# Patient Record
Sex: MALE | Race: BLACK OR AFRICAN AMERICAN | Employment: UNEMPLOYED | URBAN - METROPOLITAN AREA
[De-identification: names, ages, dates, MRNs, and addresses within clinical notes are randomized per-mention and may not be internally consistent; named-entity substitution may affect disease eponyms.]

---

## 2019-10-11 ENCOUNTER — HOSPITAL ENCOUNTER (EMERGENCY)
Age: 64
Discharge: HOME | End: 2019-10-11
Attending: EMERGENCY MEDICINE
Payer: MEDICAID

## 2019-10-11 ENCOUNTER — APPOINTMENT (OUTPATIENT)
Dept: CT IMAGING | Age: 64
End: 2019-10-11
Payer: MEDICAID

## 2019-10-11 VITALS
BODY MASS INDEX: 33.03 KG/M2 | WEIGHT: 223 LBS | HEART RATE: 97 BPM | RESPIRATION RATE: 16 BRPM | HEIGHT: 69 IN | DIASTOLIC BLOOD PRESSURE: 90 MMHG | SYSTOLIC BLOOD PRESSURE: 176 MMHG | OXYGEN SATURATION: 97 % | TEMPERATURE: 98.5 F

## 2019-10-11 DIAGNOSIS — K52.9 GASTROENTERITIS: Primary | ICD-10-CM

## 2019-10-11 LAB
ALBUMIN SERPL-MCNC: 4.8 G/DL (ref 3.5–4.6)
ALP BLD-CCNC: 100 U/L (ref 35–104)
ALT SERPL-CCNC: 25 U/L (ref 0–41)
ANION GAP SERPL CALCULATED.3IONS-SCNC: 13 MEQ/L (ref 9–15)
AST SERPL-CCNC: 26 U/L (ref 0–40)
BASOPHILS ABSOLUTE: 0.1 K/UL (ref 0–0.2)
BASOPHILS RELATIVE PERCENT: 0.5 %
BILIRUB SERPL-MCNC: 1 MG/DL (ref 0.2–0.7)
BILIRUBIN DIRECT: <0.2 MG/DL (ref 0–0.4)
BILIRUBIN, INDIRECT: ABNORMAL MG/DL (ref 0–0.6)
BUN BLDV-MCNC: 21 MG/DL (ref 8–23)
CALCIUM SERPL-MCNC: 9.6 MG/DL (ref 8.5–9.9)
CHLORIDE BLD-SCNC: 100 MEQ/L (ref 95–107)
CO2: 27 MEQ/L (ref 20–31)
CREAT SERPL-MCNC: 1.25 MG/DL (ref 0.7–1.2)
EOSINOPHILS ABSOLUTE: 0 K/UL (ref 0–0.7)
EOSINOPHILS RELATIVE PERCENT: 0.4 %
GFR AFRICAN AMERICAN: >60
GFR NON-AFRICAN AMERICAN: 58.5
GLUCOSE BLD-MCNC: 130 MG/DL (ref 70–99)
HCT VFR BLD CALC: 44.9 % (ref 42–52)
HEMOGLOBIN: 14.7 G/DL (ref 14–18)
LIPASE: 18 U/L (ref 12–95)
LYMPHOCYTES ABSOLUTE: 1.7 K/UL (ref 1–4.8)
LYMPHOCYTES RELATIVE PERCENT: 14.8 %
MCH RBC QN AUTO: 27.8 PG (ref 27–31.3)
MCHC RBC AUTO-ENTMCNC: 32.8 % (ref 33–37)
MCV RBC AUTO: 84.7 FL (ref 80–100)
MONOCYTES ABSOLUTE: 0.6 K/UL (ref 0.2–0.8)
MONOCYTES RELATIVE PERCENT: 5.2 %
NEUTROPHILS ABSOLUTE: 8.9 K/UL (ref 1.4–6.5)
NEUTROPHILS RELATIVE PERCENT: 79.1 %
PDW BLD-RTO: 14.7 % (ref 11.5–14.5)
PLATELET # BLD: 315 K/UL (ref 130–400)
POTASSIUM SERPL-SCNC: 4 MEQ/L (ref 3.4–4.9)
RBC # BLD: 5.3 M/UL (ref 4.7–6.1)
SODIUM BLD-SCNC: 140 MEQ/L (ref 135–144)
TOTAL PROTEIN: 8.2 G/DL (ref 6.3–8)
WBC # BLD: 11.3 K/UL (ref 4.8–10.8)

## 2019-10-11 PROCEDURE — 85025 COMPLETE CBC W/AUTO DIFF WBC: CPT

## 2019-10-11 PROCEDURE — 74177 CT ABD & PELVIS W/CONTRAST: CPT

## 2019-10-11 PROCEDURE — 83690 ASSAY OF LIPASE: CPT

## 2019-10-11 PROCEDURE — 80048 BASIC METABOLIC PNL TOTAL CA: CPT

## 2019-10-11 PROCEDURE — 6360000002 HC RX W HCPCS: Performed by: EMERGENCY MEDICINE

## 2019-10-11 PROCEDURE — 2580000003 HC RX 258: Performed by: EMERGENCY MEDICINE

## 2019-10-11 PROCEDURE — 99284 EMERGENCY DEPT VISIT MOD MDM: CPT

## 2019-10-11 PROCEDURE — 80076 HEPATIC FUNCTION PANEL: CPT

## 2019-10-11 PROCEDURE — 96374 THER/PROPH/DIAG INJ IV PUSH: CPT

## 2019-10-11 PROCEDURE — 36415 COLL VENOUS BLD VENIPUNCTURE: CPT

## 2019-10-11 PROCEDURE — 6360000004 HC RX CONTRAST MEDICATION: Performed by: EMERGENCY MEDICINE

## 2019-10-11 RX ORDER — ONDANSETRON 2 MG/ML
4 INJECTION INTRAMUSCULAR; INTRAVENOUS ONCE
Status: COMPLETED | OUTPATIENT
Start: 2019-10-11 | End: 2019-10-11

## 2019-10-11 RX ORDER — SIMVASTATIN 5 MG
5 TABLET ORAL NIGHTLY
COMMUNITY

## 2019-10-11 RX ORDER — MORPHINE SULFATE 4 MG/ML
4 INJECTION, SOLUTION INTRAMUSCULAR; INTRAVENOUS
Status: DISCONTINUED | OUTPATIENT
Start: 2019-10-11 | End: 2019-10-11 | Stop reason: HOSPADM

## 2019-10-11 RX ORDER — HYDROCHLOROTHIAZIDE 12.5 MG/1
25 TABLET ORAL DAILY
COMMUNITY

## 2019-10-11 RX ORDER — ONDANSETRON 4 MG/1
4 TABLET, ORALLY DISINTEGRATING ORAL EVERY 8 HOURS PRN
Qty: 10 TABLET | Refills: 0 | Status: SHIPPED | OUTPATIENT
Start: 2019-10-11

## 2019-10-11 RX ORDER — 0.9 % SODIUM CHLORIDE 0.9 %
500 INTRAVENOUS SOLUTION INTRAVENOUS ONCE
Status: COMPLETED | OUTPATIENT
Start: 2019-10-11 | End: 2019-10-11

## 2019-10-11 RX ADMIN — SODIUM CHLORIDE 500 ML: 9 INJECTION, SOLUTION INTRAVENOUS at 16:59

## 2019-10-11 RX ADMIN — IOPAMIDOL 100 ML: 755 INJECTION, SOLUTION INTRAVENOUS at 17:39

## 2019-10-11 RX ADMIN — MORPHINE SULFATE 4 MG: 4 INJECTION, SOLUTION INTRAMUSCULAR; INTRAVENOUS at 16:59

## 2019-10-11 RX ADMIN — ONDANSETRON 4 MG: 2 INJECTION INTRAMUSCULAR; INTRAVENOUS at 16:59

## 2019-10-11 ASSESSMENT — ENCOUNTER SYMPTOMS
BACK PAIN: 0
SHORTNESS OF BREATH: 0
EYE PAIN: 0
RESPIRATORY NEGATIVE: 1
DIARRHEA: 0
COUGH: 0
ABDOMINAL DISTENTION: 0
CHEST TIGHTNESS: 0
EYE DISCHARGE: 0
NAUSEA: 1
WHEEZING: 0
SORE THROAT: 0
EYES NEGATIVE: 1
BLOOD IN STOOL: 0
VOMITING: 1
ABDOMINAL PAIN: 1
SINUS PAIN: 0

## 2019-10-11 ASSESSMENT — PAIN DESCRIPTION - FREQUENCY: FREQUENCY: CONTINUOUS

## 2019-10-11 ASSESSMENT — PAIN SCALES - GENERAL
PAINLEVEL_OUTOF10: 5
PAINLEVEL_OUTOF10: 7

## 2019-10-11 ASSESSMENT — PAIN DESCRIPTION - DESCRIPTORS: DESCRIPTORS: STABBING;CONSTANT

## 2019-10-11 ASSESSMENT — PAIN DESCRIPTION - ONSET: ONSET: SUDDEN

## 2019-10-11 ASSESSMENT — PAIN DESCRIPTION - LOCATION: LOCATION: ABDOMEN;FLANK

## 2019-10-11 ASSESSMENT — PAIN DESCRIPTION - PAIN TYPE: TYPE: ACUTE PAIN

## 2019-10-11 ASSESSMENT — PAIN DESCRIPTION - ORIENTATION: ORIENTATION: RIGHT;LOWER

## 2023-11-17 ENCOUNTER — APPOINTMENT (OUTPATIENT)
Dept: RADIOLOGY | Facility: HOSPITAL | Age: 68
End: 2023-11-17
Payer: MEDICAID

## 2023-11-17 ENCOUNTER — HOSPITAL ENCOUNTER (INPATIENT)
Facility: HOSPITAL | Age: 68
LOS: 5 days | Discharge: NURSING, REHAB OR LONG-TERM CARE | End: 2023-11-22
Attending: EMERGENCY MEDICINE | Admitting: INTERNAL MEDICINE
Payer: MEDICAID

## 2023-11-17 DIAGNOSIS — I63.9 ISCHEMIC STROKE (MULTI): ICD-10-CM

## 2023-11-17 DIAGNOSIS — I63.89: Primary | ICD-10-CM

## 2023-11-17 DIAGNOSIS — I10 PRIMARY HYPERTENSION: ICD-10-CM

## 2023-11-17 LAB
ALBUMIN SERPL BCP-MCNC: 2 G/DL (ref 3.4–5)
ALP SERPL-CCNC: 35 U/L (ref 33–136)
ALT SERPL W P-5'-P-CCNC: 7 U/L (ref 10–52)
ANION GAP SERPL CALC-SCNC: 7 MMOL/L (ref 10–20)
APTT PPP: 35 SECONDS (ref 27–38)
AST SERPL W P-5'-P-CCNC: 8 U/L (ref 9–39)
BASOPHILS # BLD AUTO: 0.05 X10*3/UL (ref 0–0.1)
BASOPHILS NFR BLD AUTO: 0.6 %
BILIRUB SERPL-MCNC: 0.5 MG/DL (ref 0–1.2)
BNP SERPL-MCNC: 202 PG/ML (ref 0–99)
BUN SERPL-MCNC: 7 MG/DL (ref 6–23)
CALCIUM SERPL-MCNC: 4.3 MG/DL (ref 8.6–10.3)
CARDIAC TROPONIN I PNL SERPL HS: 19 NG/L (ref 0–20)
CHLORIDE SERPL-SCNC: 115 MMOL/L (ref 98–107)
CHOLEST SERPL-MCNC: 220 MG/DL (ref 0–199)
CHOLESTEROL/HDL RATIO: 4.1
CK SERPL-CCNC: 214 U/L (ref 0–325)
CO2 SERPL-SCNC: 16 MMOL/L (ref 21–32)
CREAT SERPL-MCNC: 0.44 MG/DL (ref 0.5–1.3)
EOSINOPHIL # BLD AUTO: 0.05 X10*3/UL (ref 0–0.7)
EOSINOPHIL NFR BLD AUTO: 0.6 %
ERYTHROCYTE [DISTWIDTH] IN BLOOD BY AUTOMATED COUNT: 13.9 % (ref 11.5–14.5)
GFR SERPL CREATININE-BSD FRML MDRD: >90 ML/MIN/1.73M*2
GLUCOSE BLD MANUAL STRIP-MCNC: 102 MG/DL (ref 74–99)
GLUCOSE SERPL-MCNC: 66 MG/DL (ref 74–99)
HCT VFR BLD AUTO: 44.5 % (ref 41–52)
HDLC SERPL-MCNC: 53.9 MG/DL
HGB BLD-MCNC: 14.4 G/DL (ref 13.5–17.5)
IMM GRANULOCYTES # BLD AUTO: 0.02 X10*3/UL (ref 0–0.7)
IMM GRANULOCYTES NFR BLD AUTO: 0.2 % (ref 0–0.9)
INR PPP: 1.3 (ref 0.9–1.1)
LDLC SERPL CALC-MCNC: 138 MG/DL
LYMPHOCYTES # BLD AUTO: 2.33 X10*3/UL (ref 1.2–4.8)
LYMPHOCYTES NFR BLD AUTO: 28.3 %
MCH RBC QN AUTO: 27 PG (ref 26–34)
MCHC RBC AUTO-ENTMCNC: 32.4 G/DL (ref 32–36)
MCV RBC AUTO: 83 FL (ref 80–100)
MONOCYTES # BLD AUTO: 0.67 X10*3/UL (ref 0.1–1)
MONOCYTES NFR BLD AUTO: 8.1 %
NEUTROPHILS # BLD AUTO: 5.11 X10*3/UL (ref 1.2–7.7)
NEUTROPHILS NFR BLD AUTO: 62.2 %
NON HDL CHOLESTEROL: 166 MG/DL (ref 0–149)
NRBC BLD-RTO: 0 /100 WBCS (ref 0–0)
PLATELET # BLD AUTO: 240 X10*3/UL (ref 150–450)
POTASSIUM SERPL-SCNC: 2.1 MMOL/L (ref 3.5–5.3)
PROT SERPL-MCNC: 3.2 G/DL (ref 6.4–8.2)
PROTHROMBIN TIME: 14.7 SECONDS (ref 9.8–12.8)
RBC # BLD AUTO: 5.34 X10*6/UL (ref 4.5–5.9)
SODIUM SERPL-SCNC: 136 MMOL/L (ref 136–145)
TRIGL SERPL-MCNC: 142 MG/DL (ref 0–149)
VLDL: 28 MG/DL (ref 0–40)
WBC # BLD AUTO: 8.2 X10*3/UL (ref 4.4–11.3)

## 2023-11-17 PROCEDURE — 96375 TX/PRO/DX INJ NEW DRUG ADDON: CPT

## 2023-11-17 PROCEDURE — 84484 ASSAY OF TROPONIN QUANT: CPT | Performed by: EMERGENCY MEDICINE

## 2023-11-17 PROCEDURE — 96374 THER/PROPH/DIAG INJ IV PUSH: CPT

## 2023-11-17 PROCEDURE — 2500000004 HC RX 250 GENERAL PHARMACY W/ HCPCS (ALT 636 FOR OP/ED): Performed by: INTERNAL MEDICINE

## 2023-11-17 PROCEDURE — 2500000001 HC RX 250 WO HCPCS SELF ADMINISTERED DRUGS (ALT 637 FOR MEDICARE OP): Performed by: EMERGENCY MEDICINE

## 2023-11-17 PROCEDURE — 70551 MRI BRAIN STEM W/O DYE: CPT

## 2023-11-17 PROCEDURE — 70450 CT HEAD/BRAIN W/O DYE: CPT

## 2023-11-17 PROCEDURE — 85025 COMPLETE CBC W/AUTO DIFF WBC: CPT | Performed by: EMERGENCY MEDICINE

## 2023-11-17 PROCEDURE — 80053 COMPREHEN METABOLIC PANEL: CPT | Performed by: EMERGENCY MEDICINE

## 2023-11-17 PROCEDURE — 83036 HEMOGLOBIN GLYCOSYLATED A1C: CPT | Mod: AHULAB | Performed by: INTERNAL MEDICINE

## 2023-11-17 PROCEDURE — 99285 EMERGENCY DEPT VISIT HI MDM: CPT | Mod: 25,27 | Performed by: EMERGENCY MEDICINE

## 2023-11-17 PROCEDURE — 99291 CRITICAL CARE FIRST HOUR: CPT | Performed by: EMERGENCY MEDICINE

## 2023-11-17 PROCEDURE — 85730 THROMBOPLASTIN TIME PARTIAL: CPT | Performed by: EMERGENCY MEDICINE

## 2023-11-17 PROCEDURE — 2550000001 HC RX 255 CONTRASTS: Performed by: EMERGENCY MEDICINE

## 2023-11-17 PROCEDURE — 2500000004 HC RX 250 GENERAL PHARMACY W/ HCPCS (ALT 636 FOR OP/ED): Performed by: EMERGENCY MEDICINE

## 2023-11-17 PROCEDURE — 83880 ASSAY OF NATRIURETIC PEPTIDE: CPT | Performed by: INTERNAL MEDICINE

## 2023-11-17 PROCEDURE — 70551 MRI BRAIN STEM W/O DYE: CPT | Performed by: SURGERY

## 2023-11-17 PROCEDURE — 85610 PROTHROMBIN TIME: CPT | Performed by: EMERGENCY MEDICINE

## 2023-11-17 PROCEDURE — 2060000001 HC INTERMEDIATE ICU ROOM DAILY

## 2023-11-17 PROCEDURE — 70498 CT ANGIOGRAPHY NECK: CPT

## 2023-11-17 PROCEDURE — 70498 CT ANGIOGRAPHY NECK: CPT | Performed by: RADIOLOGY

## 2023-11-17 PROCEDURE — 82947 ASSAY GLUCOSE BLOOD QUANT: CPT

## 2023-11-17 PROCEDURE — 82550 ASSAY OF CK (CPK): CPT | Performed by: EMERGENCY MEDICINE

## 2023-11-17 PROCEDURE — 36415 COLL VENOUS BLD VENIPUNCTURE: CPT | Performed by: INTERNAL MEDICINE

## 2023-11-17 PROCEDURE — 70496 CT ANGIOGRAPHY HEAD: CPT | Performed by: RADIOLOGY

## 2023-11-17 PROCEDURE — 36415 COLL VENOUS BLD VENIPUNCTURE: CPT | Performed by: EMERGENCY MEDICINE

## 2023-11-17 PROCEDURE — 70496 CT ANGIOGRAPHY HEAD: CPT

## 2023-11-17 PROCEDURE — 80061 LIPID PANEL: CPT | Performed by: INTERNAL MEDICINE

## 2023-11-17 RX ORDER — LORAZEPAM 2 MG/ML
1 INJECTION INTRAMUSCULAR ONCE
Status: COMPLETED | OUTPATIENT
Start: 2023-11-17 | End: 2023-11-17

## 2023-11-17 RX ORDER — TALC
3 POWDER (GRAM) TOPICAL DAILY
Status: DISCONTINUED | OUTPATIENT
Start: 2023-11-17 | End: 2023-11-22 | Stop reason: HOSPADM

## 2023-11-17 RX ORDER — HYDRALAZINE HYDROCHLORIDE 20 MG/ML
10 INJECTION INTRAMUSCULAR; INTRAVENOUS ONCE
Status: COMPLETED | OUTPATIENT
Start: 2023-11-17 | End: 2023-11-17

## 2023-11-17 RX ORDER — HYDRALAZINE HYDROCHLORIDE 25 MG/1
25 TABLET, FILM COATED ORAL EVERY 6 HOURS PRN
Status: DISCONTINUED | OUTPATIENT
Start: 2023-11-19 | End: 2023-11-17

## 2023-11-17 RX ORDER — LABETALOL HYDROCHLORIDE 5 MG/ML
10 INJECTION, SOLUTION INTRAVENOUS EVERY 10 MIN PRN
Status: ACTIVE | OUTPATIENT
Start: 2023-11-17 | End: 2023-11-19

## 2023-11-17 RX ORDER — ENOXAPARIN SODIUM 100 MG/ML
40 INJECTION SUBCUTANEOUS DAILY
Status: DISCONTINUED | OUTPATIENT
Start: 2023-11-18 | End: 2023-11-22 | Stop reason: HOSPADM

## 2023-11-17 RX ORDER — ONDANSETRON HYDROCHLORIDE 2 MG/ML
4 INJECTION, SOLUTION INTRAVENOUS EVERY 8 HOURS PRN
Status: DISCONTINUED | OUTPATIENT
Start: 2023-11-17 | End: 2023-11-22 | Stop reason: HOSPADM

## 2023-11-17 RX ORDER — ACETAMINOPHEN 325 MG/1
650 TABLET ORAL EVERY 4 HOURS PRN
Status: DISCONTINUED | OUTPATIENT
Start: 2023-11-17 | End: 2023-11-22 | Stop reason: HOSPADM

## 2023-11-17 RX ORDER — CLONIDINE HYDROCHLORIDE 0.1 MG/1
0.2 TABLET ORAL ONCE
Status: COMPLETED | OUTPATIENT
Start: 2023-11-17 | End: 2023-11-17

## 2023-11-17 RX ORDER — ONDANSETRON 4 MG/1
4 TABLET, FILM COATED ORAL EVERY 8 HOURS PRN
Status: DISCONTINUED | OUTPATIENT
Start: 2023-11-17 | End: 2023-11-22 | Stop reason: HOSPADM

## 2023-11-17 RX ORDER — POTASSIUM CHLORIDE 20 MEQ/1
40 TABLET, EXTENDED RELEASE ORAL ONCE
Status: COMPLETED | OUTPATIENT
Start: 2023-11-17 | End: 2023-11-17

## 2023-11-17 RX ORDER — HYDRALAZINE HYDROCHLORIDE 20 MG/ML
10 INJECTION INTRAMUSCULAR; INTRAVENOUS EVERY 4 HOURS PRN
Status: DISCONTINUED | OUTPATIENT
Start: 2023-11-17 | End: 2023-11-22 | Stop reason: HOSPADM

## 2023-11-17 RX ORDER — ACETAMINOPHEN 160 MG/5ML
650 SOLUTION ORAL EVERY 4 HOURS PRN
Status: DISCONTINUED | OUTPATIENT
Start: 2023-11-17 | End: 2023-11-22 | Stop reason: HOSPADM

## 2023-11-17 RX ORDER — POTASSIUM CHLORIDE 14.9 MG/ML
20 INJECTION INTRAVENOUS ONCE
Status: COMPLETED | OUTPATIENT
Start: 2023-11-17 | End: 2023-11-17

## 2023-11-17 RX ORDER — SENNOSIDES 8.6 MG/1
2 TABLET ORAL 2 TIMES DAILY
Status: DISCONTINUED | OUTPATIENT
Start: 2023-11-17 | End: 2023-11-22 | Stop reason: HOSPADM

## 2023-11-17 RX ORDER — CALCIUM GLUCONATE 20 MG/ML
1 INJECTION, SOLUTION INTRAVENOUS ONCE
Status: COMPLETED | OUTPATIENT
Start: 2023-11-17 | End: 2023-11-17

## 2023-11-17 RX ORDER — ATORVASTATIN CALCIUM 80 MG/1
80 TABLET, FILM COATED ORAL NIGHTLY
Status: DISCONTINUED | OUTPATIENT
Start: 2023-11-17 | End: 2023-11-22 | Stop reason: HOSPADM

## 2023-11-17 RX ORDER — ACETAMINOPHEN 650 MG/1
650 SUPPOSITORY RECTAL EVERY 4 HOURS PRN
Status: DISCONTINUED | OUTPATIENT
Start: 2023-11-17 | End: 2023-11-22 | Stop reason: HOSPADM

## 2023-11-17 RX ORDER — POTASSIUM CHLORIDE 1.5 G/1.58G
20 POWDER, FOR SOLUTION ORAL ONCE
Status: COMPLETED | OUTPATIENT
Start: 2023-11-17 | End: 2023-11-17

## 2023-11-17 RX ORDER — ASPIRIN 81 MG/1
81 TABLET ORAL DAILY
Status: DISCONTINUED | OUTPATIENT
Start: 2023-11-18 | End: 2023-11-22 | Stop reason: HOSPADM

## 2023-11-17 RX ORDER — MAGNESIUM HYDROXIDE 2400 MG/10ML
10 SUSPENSION ORAL DAILY PRN
Status: DISCONTINUED | OUTPATIENT
Start: 2023-11-17 | End: 2023-11-22 | Stop reason: HOSPADM

## 2023-11-17 RX ORDER — HYDRALAZINE HYDROCHLORIDE 20 MG/ML
10 INJECTION INTRAMUSCULAR; INTRAVENOUS
Status: DISCONTINUED | OUTPATIENT
Start: 2023-11-17 | End: 2023-11-17

## 2023-11-17 RX ADMIN — IOHEXOL 75 ML: 350 INJECTION, SOLUTION INTRAVENOUS at 14:01

## 2023-11-17 RX ADMIN — POTASSIUM CHLORIDE 20 MEQ: 14.9 INJECTION, SOLUTION INTRAVENOUS at 16:06

## 2023-11-17 RX ADMIN — CALCIUM GLUCONATE 1 G: 20 INJECTION, SOLUTION INTRAVENOUS at 18:19

## 2023-11-17 RX ADMIN — POTASSIUM CHLORIDE 20 MEQ: 1.5 FOR SOLUTION ORAL at 15:30

## 2023-11-17 RX ADMIN — LORAZEPAM 1 MG: 2 INJECTION INTRAMUSCULAR; INTRAVENOUS at 17:46

## 2023-11-17 RX ADMIN — HYDRALAZINE HYDROCHLORIDE 10 MG: 20 INJECTION INTRAMUSCULAR; INTRAVENOUS at 16:06

## 2023-11-17 RX ADMIN — CLONIDINE HYDROCHLORIDE 0.2 MG: 0.1 TABLET ORAL at 16:52

## 2023-11-17 RX ADMIN — POTASSIUM CHLORIDE 40 MEQ: 1500 TABLET, EXTENDED RELEASE ORAL at 18:44

## 2023-11-17 ASSESSMENT — COLUMBIA-SUICIDE SEVERITY RATING SCALE - C-SSRS
2. HAVE YOU ACTUALLY HAD ANY THOUGHTS OF KILLING YOURSELF?: NO
1. IN THE PAST MONTH, HAVE YOU WISHED YOU WERE DEAD OR WISHED YOU COULD GO TO SLEEP AND NOT WAKE UP?: NO
2. HAVE YOU ACTUALLY HAD ANY THOUGHTS OF KILLING YOURSELF?: NO
1. IN THE PAST MONTH, HAVE YOU WISHED YOU WERE DEAD OR WISHED YOU COULD GO TO SLEEP AND NOT WAKE UP?: NO
6. HAVE YOU EVER DONE ANYTHING, STARTED TO DO ANYTHING, OR PREPARED TO DO ANYTHING TO END YOUR LIFE?: NO
6. HAVE YOU EVER DONE ANYTHING, STARTED TO DO ANYTHING, OR PREPARED TO DO ANYTHING TO END YOUR LIFE?: NO

## 2023-11-17 ASSESSMENT — PAIN - FUNCTIONAL ASSESSMENT: PAIN_FUNCTIONAL_ASSESSMENT: 0-10

## 2023-11-17 ASSESSMENT — PAIN SCALES - GENERAL: PAINLEVEL_OUTOF10: 0 - NO PAIN

## 2023-11-17 NOTE — ED PROVIDER NOTES
HPI   No chief complaint on file.      Chief complaint: Stroke symptoms    History of present illness: Patient is a 68-year-old male presenting to the emergency department complaints of stroke symptoms.  According to the patient and EMS, his last well-known at approximately 2300 hrs. yesterday.  The patient states that he is having weakness on his left side.  The patient went to the ground today and was able to get back up.  The patient denies ever having symptoms like this in the past he denies any pain at this time.  The patient states that he was eventually able to call 911 and the patient was brought to the emergency department for further evaluation.  He denies any difficulty with speech.      Last Known Well Time: 23:00 Yesterday          Interval: Baseline  Time: 2:09 PM  Person Administering Scale: Nabor Lomeli MD     1a  Level of consciousness: 0=alert; keenly responsive  1b. LOC questions:  0=Performs both tasks correctly  1c. LOC commands: 0=Performs both tasks correctly  2.  Best Gaze: 0=normal  3. Visual: 0=No visual loss  4. Facial Palsy: 1=Minor paralysis (flattened nasolabial fold, asymmetric on smiling)  5a. Motor left arm: 3=No effort against gravity, limb falls  5b.  Motor right arm: 0=No drift, limb holds 90 (or 45) degrees for full 10 seconds  6a. motor left leg: 3=No effort against gravity, limb falls  6b  Motor right le=No drift, limb holds 90 (or 45) degrees for full 10 seconds  7. Limb Ataxia: 0=Absent  8.  Sensory: 1=Mild to moderate sensory loss; patient feels pinprick is less sharp or is dull on the affected side; there is a loss of superficial pain with pinprick but patient is aware He is being touched  9. Best Language:  0=No aphasia, normal  10. Dysarthria: 0=Normal  11. Extinction and Inattention: 0=No abnormality    Total:   8     Patient is VAN positive      History provided by:  Patient and EMS personnel   used: No                        Ananda Coma  Scale Score: 15                  Patient History   History reviewed. No pertinent past medical history.  History reviewed. No pertinent surgical history.  No family history on file.  Social History     Tobacco Use    Smoking status: Unknown    Smokeless tobacco: Not on file   Substance Use Topics    Alcohol use: Not on file    Drug use: Not on file       Physical Exam   ED Triage Vitals   Temp Pulse Resp BP   -- -- -- --      SpO2 Temp src Heart Rate Source Patient Position   -- -- -- --      BP Location FiO2 (%)     -- --       Physical Exam    ED Course & MDM   Diagnoses as of 11/22/23 1956   Acute arterial ischemic stroke, multifocal, mult vascular territories (CMS/HCC)   Ischemic stroke (CMS/HCC)       Medical Decision Making  Medical decision making: Patient remained stable throughout his time in the emergency department.  On arrival to the emergency department, I met the patient at the ambulance bay door.  I performed my NIH of the patient which was 8.  The patient was Van positive however, it was evident that the patient not be a candidate candidate for clot busting medication as he was outside the 4-hour window.  After my brief evaluation, the patient was taken directly to CAT scan.    Chem-7 demonstrated a potassium of 2.1 and a calcium of 4.3.  The patient's PTT and INR were both within normal limits.  The patient troponin was within normal limits.  Creatinine kinase was only 214.  CAT scan of the patient's head demonstrated chronic lacunar infarcts, ventriculomegaly but no evidence of acute infarct or bleed.  The patient CT angiography demonstrated arthrosclerosis of the intra cranial arteries but did not demonstrate any evidence of large vessel occlusion. Patient's EKG demonstrates a normal sinus rhythm with a rate of 67 bpm isoelectric ST segments narrow QRS complexes and a QTc of 412    Clinically, the patient is presenting with a ischemic stroke affecting his left-sided motor skills.  At this time,  the patient will require admission to the hospital for further testing and evaluation by neurology.  This was explained to the patient expressed understanding.  I then spoke with the hospitalist who agreed the patient could be admitted to his service for further evaluation and therapy.      Amount and/or Complexity of Data Reviewed  Labs: ordered. Decision-making details documented in ED Course.  Radiology: ordered. Decision-making details documented in ED Course.  ECG/medicine tests: ordered and independent interpretation performed.        Procedure  Critical Care    Performed by: Nabor Lomeli MD  Authorized by: Nabor Lomeli MD    Critical care provider statement:     Critical care time (minutes):  35    Critical care time was exclusive of:  Separately billable procedures and treating other patients    Critical care was necessary to treat or prevent imminent or life-threatening deterioration of the following conditions:  CNS failure or compromise    Critical care was time spent personally by me on the following activities:  Blood draw for specimens, examination of patient, ordering and review of laboratory studies, ordering and review of radiographic studies and re-evaluation of patient's condition    I assumed direction of critical care for this patient from another provider in my specialty: no      Care discussed with: admitting provider         Nabor Lomeli MD  11/22/23 2004       Nabor Lomeli MD  12/17/23 [id number]

## 2023-11-17 NOTE — ED TRIAGE NOTES
Pt arrived via EMS from home with chief c/o of L sided weakness. Pt states he fell last night and has been on the floor since 12am. Pt arrived with L sided weakness, L sided facial droop, covering in urine. Pt is alert and oriented on arrival. Stroke alert called en route to hospital by EMS. Pt has hx of 6 cardiac stents.

## 2023-11-18 ENCOUNTER — APPOINTMENT (OUTPATIENT)
Dept: CARDIOLOGY | Facility: HOSPITAL | Age: 68
End: 2023-11-18
Payer: MEDICAID

## 2023-11-18 LAB
AMPHETAMINES UR QL SCN: NORMAL
ANION GAP SERPL CALC-SCNC: 14 MMOL/L (ref 10–20)
APPEARANCE UR: CLEAR
BARBITURATES UR QL SCN: NORMAL
BASOPHILS # BLD AUTO: 0.07 X10*3/UL (ref 0–0.1)
BASOPHILS NFR BLD AUTO: 0.8 %
BENZODIAZ UR QL SCN: NORMAL
BILIRUB UR STRIP.AUTO-MCNC: NEGATIVE MG/DL
BUN SERPL-MCNC: 17 MG/DL (ref 6–23)
BZE UR QL SCN: NORMAL
CALCIUM SERPL-MCNC: 8.7 MG/DL (ref 8.6–10.3)
CANNABINOIDS UR QL SCN: NORMAL
CHLORIDE SERPL-SCNC: 107 MMOL/L (ref 98–107)
CO2 SERPL-SCNC: 21 MMOL/L (ref 21–32)
COLOR UR: YELLOW
CREAT SERPL-MCNC: 1.33 MG/DL (ref 0.5–1.3)
EOSINOPHIL # BLD AUTO: 0.14 X10*3/UL (ref 0–0.7)
EOSINOPHIL NFR BLD AUTO: 1.6 %
ERYTHROCYTE [DISTWIDTH] IN BLOOD BY AUTOMATED COUNT: 14.1 % (ref 11.5–14.5)
EST. AVERAGE GLUCOSE BLD GHB EST-MCNC: 123 MG/DL
FENTANYL+NORFENTANYL UR QL SCN: NORMAL
GFR SERPL CREATININE-BSD FRML MDRD: 59 ML/MIN/1.73M*2
GLUCOSE BLD MANUAL STRIP-MCNC: 119 MG/DL (ref 74–99)
GLUCOSE BLD MANUAL STRIP-MCNC: 127 MG/DL (ref 74–99)
GLUCOSE BLD MANUAL STRIP-MCNC: 128 MG/DL (ref 74–99)
GLUCOSE BLD MANUAL STRIP-MCNC: 129 MG/DL (ref 74–99)
GLUCOSE BLD MANUAL STRIP-MCNC: 159 MG/DL (ref 74–99)
GLUCOSE SERPL-MCNC: 108 MG/DL (ref 74–99)
GLUCOSE UR STRIP.AUTO-MCNC: NEGATIVE MG/DL
HBA1C MFR BLD: 5.9 %
HCT VFR BLD AUTO: 48.1 % (ref 41–52)
HGB BLD-MCNC: 15.8 G/DL (ref 13.5–17.5)
HOLD SPECIMEN: NORMAL
IMM GRANULOCYTES # BLD AUTO: 0.03 X10*3/UL (ref 0–0.7)
IMM GRANULOCYTES NFR BLD AUTO: 0.3 % (ref 0–0.9)
KETONES UR STRIP.AUTO-MCNC: ABNORMAL MG/DL
LEUKOCYTE ESTERASE UR QL STRIP.AUTO: ABNORMAL
LYMPHOCYTES # BLD AUTO: 3.27 X10*3/UL (ref 1.2–4.8)
LYMPHOCYTES NFR BLD AUTO: 36.7 %
MCH RBC QN AUTO: 27.5 PG (ref 26–34)
MCHC RBC AUTO-ENTMCNC: 32.8 G/DL (ref 32–36)
MCV RBC AUTO: 84 FL (ref 80–100)
MONOCYTES # BLD AUTO: 0.78 X10*3/UL (ref 0.1–1)
MONOCYTES NFR BLD AUTO: 8.8 %
NEUTROPHILS # BLD AUTO: 4.62 X10*3/UL (ref 1.2–7.7)
NEUTROPHILS NFR BLD AUTO: 51.8 %
NITRITE UR QL STRIP.AUTO: NEGATIVE
NRBC BLD-RTO: 0 /100 WBCS (ref 0–0)
OPIATES UR QL SCN: NORMAL
OXYCODONE+OXYMORPHONE UR QL SCN: NORMAL
PCP UR QL SCN: NORMAL
PH UR STRIP.AUTO: 6 [PH]
PLATELET # BLD AUTO: 254 X10*3/UL (ref 150–450)
POTASSIUM SERPL-SCNC: 3.9 MMOL/L (ref 3.5–5.3)
PROT UR STRIP.AUTO-MCNC: NEGATIVE MG/DL
RBC # BLD AUTO: 5.75 X10*6/UL (ref 4.5–5.9)
RBC # UR STRIP.AUTO: NEGATIVE /UL
RBC #/AREA URNS AUTO: ABNORMAL /HPF
SODIUM SERPL-SCNC: 138 MMOL/L (ref 136–145)
SP GR UR STRIP.AUTO: 1.03
UROBILINOGEN UR STRIP.AUTO-MCNC: <2 MG/DL
WBC # BLD AUTO: 8.9 X10*3/UL (ref 4.4–11.3)
WBC #/AREA URNS AUTO: ABNORMAL /HPF

## 2023-11-18 PROCEDURE — 93306 TTE W/DOPPLER COMPLETE: CPT

## 2023-11-18 PROCEDURE — 81003 URINALYSIS AUTO W/O SCOPE: CPT | Performed by: EMERGENCY MEDICINE

## 2023-11-18 PROCEDURE — 2500000004 HC RX 250 GENERAL PHARMACY W/ HCPCS (ALT 636 FOR OP/ED): Performed by: INTERNAL MEDICINE

## 2023-11-18 PROCEDURE — 80048 BASIC METABOLIC PNL TOTAL CA: CPT | Performed by: INTERNAL MEDICINE

## 2023-11-18 PROCEDURE — 83735 ASSAY OF MAGNESIUM: CPT | Performed by: INTERNAL MEDICINE

## 2023-11-18 PROCEDURE — 97162 PT EVAL MOD COMPLEX 30 MIN: CPT | Mod: GP

## 2023-11-18 PROCEDURE — 85025 COMPLETE CBC W/AUTO DIFF WBC: CPT | Performed by: INTERNAL MEDICINE

## 2023-11-18 PROCEDURE — 36415 COLL VENOUS BLD VENIPUNCTURE: CPT | Performed by: INTERNAL MEDICINE

## 2023-11-18 PROCEDURE — 93306 TTE W/DOPPLER COMPLETE: CPT | Performed by: STUDENT IN AN ORGANIZED HEALTH CARE EDUCATION/TRAINING PROGRAM

## 2023-11-18 PROCEDURE — 99223 1ST HOSP IP/OBS HIGH 75: CPT | Performed by: PSYCHIATRY & NEUROLOGY

## 2023-11-18 PROCEDURE — 96372 THER/PROPH/DIAG INJ SC/IM: CPT | Performed by: INTERNAL MEDICINE

## 2023-11-18 PROCEDURE — 2060000001 HC INTERMEDIATE ICU ROOM DAILY

## 2023-11-18 PROCEDURE — 80307 DRUG TEST PRSMV CHEM ANLYZR: CPT | Performed by: INTERNAL MEDICINE

## 2023-11-18 PROCEDURE — 82947 ASSAY GLUCOSE BLOOD QUANT: CPT

## 2023-11-18 PROCEDURE — 81001 URINALYSIS AUTO W/SCOPE: CPT | Performed by: EMERGENCY MEDICINE

## 2023-11-18 PROCEDURE — 2500000001 HC RX 250 WO HCPCS SELF ADMINISTERED DRUGS (ALT 637 FOR MEDICARE OP): Performed by: INTERNAL MEDICINE

## 2023-11-18 RX ORDER — CLOPIDOGREL BISULFATE 75 MG/1
75 TABLET ORAL DAILY
Status: DISCONTINUED | OUTPATIENT
Start: 2023-11-18 | End: 2023-11-22 | Stop reason: HOSPADM

## 2023-11-18 RX ADMIN — Medication 3 MG: at 18:21

## 2023-11-18 RX ADMIN — SENNOSIDES 17.2 MG: 8.6 TABLET, FILM COATED ORAL at 09:46

## 2023-11-18 RX ADMIN — ATORVASTATIN CALCIUM 80 MG: 80 TABLET, FILM COATED ORAL at 20:02

## 2023-11-18 RX ADMIN — CLOPIDOGREL 75 MG: 75 TABLET ORAL at 20:02

## 2023-11-18 RX ADMIN — SENNOSIDES 17.2 MG: 8.6 TABLET, FILM COATED ORAL at 20:02

## 2023-11-18 RX ADMIN — ENOXAPARIN SODIUM 40 MG: 40 INJECTION SUBCUTANEOUS at 09:46

## 2023-11-18 RX ADMIN — ASPIRIN 81 MG: 81 TABLET, COATED ORAL at 09:46

## 2023-11-18 SDOH — SOCIAL STABILITY: SOCIAL INSECURITY: DO YOU FEEL UNSAFE GOING BACK TO THE PLACE WHERE YOU ARE LIVING?: NO

## 2023-11-18 SDOH — SOCIAL STABILITY: SOCIAL INSECURITY: ABUSE: ADULT

## 2023-11-18 SDOH — SOCIAL STABILITY: SOCIAL INSECURITY: WERE YOU ABLE TO COMPLETE ALL THE BEHAVIORAL HEALTH SCREENINGS?: YES

## 2023-11-18 SDOH — SOCIAL STABILITY: SOCIAL INSECURITY: DO YOU FEEL ANYONE HAS EXPLOITED OR TAKEN ADVANTAGE OF YOU FINANCIALLY OR OF YOUR PERSONAL PROPERTY?: NO

## 2023-11-18 SDOH — SOCIAL STABILITY: SOCIAL INSECURITY: ARE THERE ANY APPARENT SIGNS OF INJURIES/BEHAVIORS THAT COULD BE RELATED TO ABUSE/NEGLECT?: NO

## 2023-11-18 SDOH — SOCIAL STABILITY: SOCIAL INSECURITY: HAVE YOU HAD THOUGHTS OF HARMING ANYONE ELSE?: NO

## 2023-11-18 SDOH — SOCIAL STABILITY: SOCIAL INSECURITY: ARE YOU OR HAVE YOU BEEN THREATENED OR ABUSED PHYSICALLY, EMOTIONALLY, OR SEXUALLY BY ANYONE?: NO

## 2023-11-18 SDOH — SOCIAL STABILITY: SOCIAL INSECURITY: HAS ANYONE EVER THREATENED TO HURT YOUR FAMILY OR YOUR PETS?: NO

## 2023-11-18 SDOH — SOCIAL STABILITY: SOCIAL INSECURITY: DOES ANYONE TRY TO KEEP YOU FROM HAVING/CONTACTING OTHER FRIENDS OR DOING THINGS OUTSIDE YOUR HOME?: NO

## 2023-11-18 ASSESSMENT — ENCOUNTER SYMPTOMS
DIZZINESS: 1
MYALGIAS: 0
GASTROINTESTINAL NEGATIVE: 1
CHILLS: 0
APPETITE CHANGE: 0
DIAPHORESIS: 0
FATIGUE: 0
EYES NEGATIVE: 1
ARTHRALGIAS: 0
ACTIVITY CHANGE: 1
FACIAL ASYMMETRY: 1
CARDIOVASCULAR NEGATIVE: 1
PSYCHIATRIC NEGATIVE: 1
WEAKNESS: 1
RESPIRATORY NEGATIVE: 1
FEVER: 0

## 2023-11-18 ASSESSMENT — COGNITIVE AND FUNCTIONAL STATUS - GENERAL
DRESSING REGULAR LOWER BODY CLOTHING: TOTAL
PATIENT BASELINE BEDBOUND: NO
TOILETING: A LOT
TOILETING: TOTAL
CLIMB 3 TO 5 STEPS WITH RAILING: TOTAL
MOBILITY SCORE: 13
WALKING IN HOSPITAL ROOM: TOTAL
DRESSING REGULAR UPPER BODY CLOTHING: A LOT
CLIMB 3 TO 5 STEPS WITH RAILING: TOTAL
CLIMB 3 TO 5 STEPS WITH RAILING: TOTAL
DAILY ACTIVITIY SCORE: 12
WALKING IN HOSPITAL ROOM: TOTAL
HELP NEEDED FOR BATHING: A LOT
PERSONAL GROOMING: A LOT
TURNING FROM BACK TO SIDE WHILE IN FLAT BAD: A LITTLE
STANDING UP FROM CHAIR USING ARMS: TOTAL
STANDING UP FROM CHAIR USING ARMS: TOTAL
HELP NEEDED FOR BATHING: A LOT
TURNING FROM BACK TO SIDE WHILE IN FLAT BAD: A LOT
MOVING TO AND FROM BED TO CHAIR: TOTAL
HELP NEEDED FOR BATHING: A LOT
MOVING FROM LYING ON BACK TO SITTING ON SIDE OF FLAT BED WITH BEDRAILS: A LITTLE
DRESSING REGULAR UPPER BODY CLOTHING: A LOT
MOVING TO AND FROM BED TO CHAIR: A LOT
DAILY ACTIVITIY SCORE: 14
MOVING FROM LYING ON BACK TO SITTING ON SIDE OF FLAT BED WITH BEDRAILS: A LOT
TURNING FROM BACK TO SIDE WHILE IN FLAT BAD: A LITTLE
DRESSING REGULAR UPPER BODY CLOTHING: A LOT
MOVING TO AND FROM BED TO CHAIR: TOTAL
TOILETING: A LOT
MOBILITY SCORE: 13
DRESSING REGULAR LOWER BODY CLOTHING: A LOT
MOVING FROM LYING ON BACK TO SITTING ON SIDE OF FLAT BED WITH BEDRAILS: A LOT
TURNING FROM BACK TO SIDE WHILE IN FLAT BAD: A LITTLE
MOBILITY SCORE: 8
MOVING TO AND FROM BED TO CHAIR: A LOT
WALKING IN HOSPITAL ROOM: A LOT
WALKING IN HOSPITAL ROOM: A LOT
STANDING UP FROM CHAIR USING ARMS: A LOT
PERSONAL GROOMING: A LOT
PERSONAL GROOMING: A LOT
STANDING UP FROM CHAIR USING ARMS: A LOT
CLIMB 3 TO 5 STEPS WITH RAILING: TOTAL
MOVING FROM LYING ON BACK TO SITTING ON SIDE OF FLAT BED WITH BEDRAILS: A LITTLE
DAILY ACTIVITIY SCORE: 14
MOBILITY SCORE: 9
DRESSING REGULAR LOWER BODY CLOTHING: A LOT

## 2023-11-18 ASSESSMENT — ACTIVITIES OF DAILY LIVING (ADL)
LACK_OF_TRANSPORTATION: NO
WALKS IN HOME: INDEPENDENT
HEARING - LEFT EAR: FUNCTIONAL
GROOMING: INDEPENDENT
DRESSING YOURSELF: INDEPENDENT
LACK_OF_TRANSPORTATION: NO
PATIENT'S MEMORY ADEQUATE TO SAFELY COMPLETE DAILY ACTIVITIES?: YES
JUDGMENT_ADEQUATE_SAFELY_COMPLETE_DAILY_ACTIVITIES: YES
FEEDING YOURSELF: INDEPENDENT
HEARING - RIGHT EAR: FUNCTIONAL
BATHING: INDEPENDENT
ADL_ASSISTANCE: INDEPENDENT
ADEQUATE_TO_COMPLETE_ADL: YES
TOILETING: INDEPENDENT

## 2023-11-18 ASSESSMENT — LIFESTYLE VARIABLES
HOW OFTEN DO YOU HAVE A DRINK CONTAINING ALCOHOL: NEVER
SKIP TO QUESTIONS 9-10: 1
HOW MANY STANDARD DRINKS CONTAINING ALCOHOL DO YOU HAVE ON A TYPICAL DAY: PATIENT DOES NOT DRINK
AUDIT-C TOTAL SCORE: 0
HOW OFTEN DO YOU HAVE 6 OR MORE DRINKS ON ONE OCCASION: NEVER
AUDIT-C TOTAL SCORE: 0

## 2023-11-18 ASSESSMENT — PAIN - FUNCTIONAL ASSESSMENT
PAIN_FUNCTIONAL_ASSESSMENT: 0-10

## 2023-11-18 ASSESSMENT — PAIN SCALES - GENERAL
PAINLEVEL_OUTOF10: 0 - NO PAIN
PAINLEVEL_OUTOF10: 0 - NO PAIN
PAINLEVEL_OUTOF10: 2
PAINLEVEL_OUTOF10: 0 - NO PAIN

## 2023-11-18 ASSESSMENT — PATIENT HEALTH QUESTIONNAIRE - PHQ9
1. LITTLE INTEREST OR PLEASURE IN DOING THINGS: NOT AT ALL
2. FEELING DOWN, DEPRESSED OR HOPELESS: NOT AT ALL
SUM OF ALL RESPONSES TO PHQ9 QUESTIONS 1 & 2: 0

## 2023-11-18 NOTE — PROGRESS NOTES
Physical Therapy    Physical Therapy Evaluation    Patient Name: Román Marinelli  MRN: [id number]  Today's Date: 11/18/2023   Time Calculation  Start Time: 1427  Stop Time: 1444  Time Calculation (min): 17 min    Assessment/Plan   PT Assessment  PT Assessment Results: Decreased strength, Decreased endurance, Impaired balance, Decreased mobility, Decreased safety awareness, Impaired judgement  Rehab Prognosis: Good  Evaluation/Treatment Tolerance: Patient limited by fatigue  Medical Staff Made Aware: Yes  Strengths: Premorbid level of function  Barriers to Participation: Insight into problems, Attitude of self  End of Session Communication: Bedside nurse  Assessment Comment: Pt presents today with decreased strength, balance, and insight in to deficits. Currently, pt is an an increased risk of falls, and is below the reported PLOF requiring max assistance of 1-2 people with mobility. Continued PT during the current admission would benefit the pt to address the above factors and bring pt closer to the PLOF.  End of Session Patient Position: Bed, 3 rail up, Alarm on  IP OR SWING BED PT PLAN  Inpatient or Swing Bed: Inpatient  PT Plan  Treatment/Interventions: Bed mobility, Transfer training, Gait training, Stair training, Balance training, Strengthening, Neuromuscular re-education, Endurance training, Therapeutic exercise, Therapeutic activity, Home exercise program, Positioning  PT Plan: Skilled PT  PT Frequency: 4 times per week  PT Discharge Recommendations: High intensity level of continued care  Equipment Recommended upon Discharge:  (BD in future sessions)  PT Recommended Transfer Status: Total assist, Assist x2      Subjective   General Visit Information:  General  Reason for Referral: Stroke  Referred By: BRANDON Escobar  Family/Caregiver Present: No  Prior to Session Communication: Bedside nurse  Patient Position Received: Bed, 3 rail up  Preferred Learning Style: verbal, visual  General Comment: Pt supine in bed  "upon PT arrival. Cleared to participate with RN, and agreeable to PT evaluation  Home Living:  Home Living  Type of Home: House  Lives With: Siblings, Parent(s) (Step-father and sister)  Home Adaptive Equipment: None  Home Layout: One level  Home Access: Stairs to enter with rails  Entrance Stairs-Rails: Both  Entrance Stairs-Number of Steps: 3-4  Bathroom Shower/Tub: Tub/shower unit  Bathroom Toilet: Handicapped height  Bathroom Equipment: Grab bars in shower  Prior Level of Function:  Prior Function Per Pt/Caregiver Report  Level of [place]: Independent with ADLs and functional transfers, Independent with homemaking with ambulation  ADL Assistance: Independent  Homemaking Assistance: Independent  Ambulatory Assistance: Independent  Vocational: Retired  Precautions:  Precautions  Medical Precautions: Fall precautions    Objective   Pain:  Pain Assessment  Pain Assessment: 0-10  Pain Score: 0 - No pain  Cognition:  Cognition  Orientation Level: Disoriented to time (Pt reported month as the \"1st of October.\" Otherwise oriented x 3)  Insight: Severe (Pt reports being able to stand and attempts to stand without assist despite severe LUE/LLE weakness)  Impulsive: Mildly      Activity Tolerance  Endurance: Decreased tolerance for upright activites         Postural Control  Postural Control: Impaired  Head Control: Head rests in forward flexion. Pt able to briefly correct with VC  Trunk Control: Pt unable to prevent LOB to left without assist in sitting  Righting Reactions: Decreased righting reaction on LOB to left in sitting    Static Sitting Balance  Static Sitting-Balance Support: Right upper extremity supported, Feet supported (Assist with LLE placement flat on floor and VC/TC for RUE support on bed. Assist required at trunk to prevent LOB left in sitting)  Static Sitting-Level of Assistance: Maximum assistance  Static Sitting-Comment/Number of Minutes: 5 (With RUE assist on bed and max assist to prevent LOB to " left)       Functional Assessments:  Bed Mobility  Bed Mobility: Yes  Bed Mobility 1  Bed Mobility 1: Supine to sitting  Level of Assistance 1: Maximum assistance, Minimal verbal cues, Minimal tactile cues  Bed Mobility Comments 1: VC/TC for UE reach for bed rail to assist trunk toward EOB sitting. Pt able to grib bed rail and initiate pull. Assist with BLE off EOB and trunk into upright sitting.  Bed Mobility 2  Bed Mobility  2: Sitting to supine  Level of Assistance 2: Dependent (2-person)  Bed Mobility Comments 2: Assist at trunk and BLE into bed  Bed Mobility 3  Bed Mobility 3: Scooting  Level of Assistance 3: Dependent  Bed Mobility Comments 3: Dependent scoot toward HOB via chux pad/draw sheet for better positioning in supine    Transfers  Transfer: Yes  Transfer 1  Transfer From 1: Sit to  Transfer to 1: Stand  Technique 1: Sit to stand, Stand to sit  Transfer Device 1: Gait belt  Transfer Level of Assistance 1: Maximum assistance, +2, Minimal verbal cues, Minimal tactile cues  Trials/Comments 1: x2 trials. VC for RUE push on bed to stand. L knee block. Pt unable to clear bed with max assistance of 2 people.    Ambulation/Gait Training  Ambulation/Gait Training Performed: No  Extremity/Trunk Assessments:  RLE   RLE : Exceptions to WFL  Strength RLE  R Hip Flexion: 3-/5  R Knee Extension: 3-/5  R Ankle Dorsiflexion: 3-/5  R Ankle Plantar Flexion: 3+/5  LLE   LLE : Exceptions to WFL  Strength LLE  L Hip Flexion: 2-/5  L Knee Extension: 2-/5  L Ankle Dorsiflexion: 2-/5  L Ankle Plantar Flexion: 2-/5  Outcome Measures:  [facility] Basic Mobility  Turning from your back to your side while in a flat bed without using bedrails: A lot  Moving from lying on your back to sitting on the side of a flat bed without using bedrails: A lot  Moving to and from bed to chair (including a wheelchair): Total  Standing up from a chair using your arms (e.g. wheelchair or bedside chair): Total  To walk in hospital room: Total  Climbing  3-5 steps with railing: Total  Basic Mobility - Total Score: 8    Encounter Problems       Encounter Problems (Active)       Balance       Goal 1 (Progressing)       Start:  11/18/23    Expected End:  12/02/23       Pt performs all sitting balance with mod assist x 1 and standing balance with mod assist x 2 with LRAD            Mobility       STG - Patient will ambulate (Not Progressing)       Start:  11/18/23    Expected End:  12/02/23       >15 ft with mod assist x 2 and LRAD            PT Problem       PT Goal 1 (Progressing)       Start:  11/18/23    Expected End:  12/02/23       Pt demonstrates improvement of at least 1 MMT grade in all BLE MMT tested during initial PT evaluation            Transfers       STG - Patient to transfer to and from sit to supine (Progressing)       Start:  11/18/23    Expected End:  12/02/23       With mod assist x 1         STG - Patient will transfer sit to and from stand (Not Progressing)       Start:  11/18/23    Expected End:  12/02/23       With mod assist x 2 and LRAD                Education Documentation  Body Mechanics, taught by Darwin Jeter PT at 11/18/2023  3:52 PM.  Learner: Patient  Readiness: Acceptance  Method: Explanation, Demonstration  Response: Verbalizes Understanding, Needs Reinforcement    Mobility Training, taught by Darwin Jeter PT at 11/18/2023  3:52 PM.  Learner: Patient  Readiness: Acceptance  Method: Explanation, Demonstration  Response: Verbalizes Understanding, Needs Reinforcement    Education Comments  No comments found.

## 2023-11-18 NOTE — CARE PLAN
The patient's goals for the shift include      The clinical goals for the shift include Pt will remain neurologically stable throughout this shift    Problem: General Stroke  Goal: Demonstrate improvement in neurological exam throughout the shift  Outcome: Progressing  Goal: Maintain BP within ordered limits throughout shift  Outcome: Progressing  Goal: No symptoms of aspiration throughout shift  Outcome: Progressing

## 2023-11-18 NOTE — CARE PLAN
The patient's goals for the shift include      The clinical goals for the shift include Pt will remain neurologically stable throughout this shift    Over the shift, the patient did not make progress toward the following goals. Barriers to progression include . Recommendations to address these barriers include .

## 2023-11-18 NOTE — CONSULTS
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    RECOMMENDATIONS:  ASA 81 mg daily for stroke prophylaxis.  Clopidogrel 75 mg daily for 21 days.  Atorvastatin for adjunct stroke prophylaxis.  Can control BP to normal.  PT/OT to assist in determining whether he requires inpatient rehab, which in my opinion he needs given his current appearance.  Advised cocaine abstinence.    Curry Ortega Jr., M.D., FAAN       History Of Present Illness  Román Marinelli is a 68 y.o. male presenting with two weeks of left arm and leg weakness.  He was initially able to walk without assistive device.  He tried to get out of bed yesterday and instead slid to the floor.  He was unable to stand on his own and so was on the floor for twelve hours.  His sister returned home from work and found him on the floor.  EMS was summoned and he was taken to the [facility] ED, where he was not a TNK candidate because of timing.  He denies other focal neurological symptoms including dysphagia, diplopia, other focal weakness, focal sensory change, ataxia, vertigo, or bowel/bladder incontinence, among others.  He reports episodic use of cocaine, with the last time two weeks ago, just before the weakness started.    Past Medical History  Past Medical History:   Diagnosis Date    Smoking     never     Surgical History  History reviewed. No pertinent surgical history.  Social History  Social History     Tobacco Use    Smoking status: Unknown       Scheduled medications  aspirin, 81 mg, oral, Daily  atorvastatin, 80 mg, oral, Nightly  enoxaparin, 40 mg, subcutaneous, Daily  melatonin, 3 mg, oral, Daily  sennosides, 2 tablet, oral, BID      Continuous medications     PRN medications  PRN medications: acetaminophen **OR** acetaminophen **OR** acetaminophen, acetaminophen **OR** acetaminophen **OR** acetaminophen, hydrALAZINE, labetaloL, magnesium hydroxide, ondansetron  "**OR** ondansetron, oxygen      No family history on file.  Allergies  Lactose, Amlodipine, and Metoprolol  No medications prior to admission.       Scheduled medications  aspirin, 81 mg, oral, Daily  atorvastatin, 80 mg, oral, Nightly  enoxaparin, 40 mg, subcutaneous, Daily  melatonin, 3 mg, oral, Daily  sennosides, 2 tablet, oral, BID      Continuous medications     PRN medications  PRN medications: acetaminophen **OR** acetaminophen **OR** acetaminophen, acetaminophen **OR** acetaminophen **OR** acetaminophen, hydrALAZINE, labetaloL, magnesium hydroxide, ondansetron **OR** ondansetron, oxygen    Review of Systems    Last Recorded Vitals  Blood pressure 111/76, pulse 85, temperature 36.3 °C (97.3 °F), temperature source Temporal, resp. rate 18, height 1.753 m (5' 9\"), weight 98.5 kg (217 lb 1.6 oz), SpO2 93 %.    CONSTITUTIONAL:  No acute distress    CARDIOVASCULAR:  Normal pulses in the distal legs, no edema of either arm or either leg.  No carotid bruits.    MENTAL STATUS:  Awake, alert, fully oriented to self, place, and time, with present short-term memory, good awareness of recent events, normal attention span, concentration, and fund of knowledge.    SPEECH AND LANGUAGE:  Can name and repeat, follows all commands, has mild dysarthria but is understandable.    FUNDOSCOPIC:  No papilledema    CRANIAL NERVES:  II-Vision present, visual fields full to confrontational testing    III/IV/VI--EOMs are present in all directions.  Pupils are symmetrically reactive in dim light.  No ptosis.    V--Normal facial sensation.    VII--Left lower facial asymmetry.    VIII--Hearing present to voice bilaterally.    IX/X--Symmetric soft palate rise.    XI--Normal trapezius power on the right, 0/5 trapezius on the left.    XII--Tongue protrudes without deviation.    MOTOR:  Left flaccid hemiparesis:  0/5 deltoid, 1/5 biceps, 1/5 triceps, 0/5 finger and wrist extension, 2/5 finger interossei, 2/5 finger flexion, 2/5 iliacus, 3/5 " hams, 4-/5 quads, 4/5 foot dorsiflexion/plantarflexion.  Normal power, tone, and bulk in the right arm and leg.    SENSORY:  Reduced pin sensation diffusely in the left leg and patchily on the left trunk.  No distal-proximal gradient or spinal sensory level.    COORDINATION:  Normal finger-to-nose and heel-to-shin testing in the right arm and leg, unable on the left limbs.    REFLEXES are normal and symmetric at the biceps, triceps, brachioradialis, patella, and depressed at the ankle.  The plantar responses are flexor.    GAIT deferred because of left hemiparesis.    NIH Stroke Scale: 8       Relevant Results  Results for orders placed or performed during the hospital encounter of 11/17/23 (from the past 24 hour(s))   CBC and Auto Differential   Result Value Ref Range    WBC 8.2 4.4 - 11.3 x10*3/uL    nRBC 0.0 0.0 - 0.0 /100 WBCs    RBC 5.34 4.50 - 5.90 x10*6/uL    Hemoglobin 14.4 13.5 - 17.5 g/dL    Hematocrit 44.5 41.0 - 52.0 %    MCV 83 80 - 100 fL    MCH 27.0 26.0 - 34.0 pg    MCHC 32.4 32.0 - 36.0 g/dL    RDW 13.9 11.5 - 14.5 %    Platelets 240 150 - 450 x10*3/uL    Neutrophils % 62.2 40.0 - 80.0 %    Immature Granulocytes %, Automated 0.2 0.0 - 0.9 %    Lymphocytes % 28.3 13.0 - 44.0 %    Monocytes % 8.1 2.0 - 10.0 %    Eosinophils % 0.6 0.0 - 6.0 %    Basophils % 0.6 0.0 - 2.0 %    Neutrophils Absolute 5.11 1.20 - 7.70 x10*3/uL    Immature Granulocytes Absolute, Automated 0.02 0.00 - 0.70 x10*3/uL    Lymphocytes Absolute 2.33 1.20 - 4.80 x10*3/uL    Monocytes Absolute 0.67 0.10 - 1.00 x10*3/uL    Eosinophils Absolute 0.05 0.00 - 0.70 x10*3/uL    Basophils Absolute 0.05 0.00 - 0.10 x10*3/uL   Comprehensive metabolic panel   Result Value Ref Range    Glucose 66 (L) 74 - 99 mg/dL    Sodium 136 136 - 145 mmol/L    Potassium 2.1 (LL) 3.5 - 5.3 mmol/L    Chloride 115 (H) 98 - 107 mmol/L    Bicarbonate 16 (L) 21 - 32 mmol/L    Anion Gap 7 (L) 10 - 20 mmol/L    Urea Nitrogen 7 6 - 23 mg/dL    Creatinine 0.44 (L)  0.50 - 1.30 mg/dL    eGFR >90 >60 mL/min/1.73m*2    Calcium 4.3 (LL) 8.6 - 10.3 mg/dL    Albumin 2.0 (L) 3.4 - 5.0 g/dL    Alkaline Phosphatase 35 33 - 136 U/L    Total Protein 3.2 (L) 6.4 - 8.2 g/dL    AST 8 (L) 9 - 39 U/L    Bilirubin, Total 0.5 0.0 - 1.2 mg/dL    ALT 7 (L) 10 - 52 U/L   Troponin I, High Sensitivity   Result Value Ref Range    Troponin I, High Sensitivity 19 0 - 20 ng/L   Protime-INR   Result Value Ref Range    Protime 14.7 (H) 9.8 - 12.8 seconds    INR 1.3 (H) 0.9 - 1.1   APTT   Result Value Ref Range    aPTT 35 27 - 38 seconds   POCT GLUCOSE   Result Value Ref Range    POCT Glucose 102 (H) 74 - 99 mg/dL   Lipid Panel   Result Value Ref Range    Cholesterol 220 (H) 0 - 199 mg/dL    HDL-Cholesterol 53.9 mg/dL    Cholesterol/HDL Ratio 4.1     LDL Calculated 138 (H) <=99 mg/dL    VLDL 28 0 - 40 mg/dL    Triglycerides 142 0 - 149 mg/dL    Non HDL Cholesterol 166 (H) 0 - 149 mg/dL   Hemoglobin A1C   Result Value Ref Range    Hemoglobin A1C 5.9 (H) see below %    Estimated Average Glucose 123 Not Established mg/dL   B-Type Natriuretic Peptide   Result Value Ref Range     (H) 0 - 99 pg/mL   Creatine Kinase   Result Value Ref Range    Creatine Kinase 214 0 - 325 U/L   POCT GLUCOSE   Result Value Ref Range    POCT Glucose 119 (H) 74 - 99 mg/dL   Urinalysis with Reflex Microscopic   Result Value Ref Range    Color, Urine Yellow Straw, Yellow    Appearance, Urine Clear Clear    Specific Gravity, Urine 1.032 1.005 - 1.035    pH, Urine 6.0 5.0, 5.5, 6.0, 6.5, 7.0, 7.5, 8.0    Protein, Urine NEGATIVE NEGATIVE mg/dL    Glucose, Urine NEGATIVE NEGATIVE mg/dL    Blood, Urine NEGATIVE NEGATIVE    Ketones, Urine 5 (TRACE) (A) NEGATIVE mg/dL    Bilirubin, Urine NEGATIVE NEGATIVE    Urobilinogen, Urine <2.0 <2.0 mg/dL    Nitrite, Urine NEGATIVE NEGATIVE    Leukocyte Esterase, Urine TRACE (A) NEGATIVE   Microscopic Only, Urine   Result Value Ref Range    WBC, Urine 6-10 (A) 1-5, NONE /HPF    RBC, Urine NONE  NONE, 1-2, 3-5 /HPF   Drug Screen, Urine   Result Value Ref Range    Amphetamine Screen, Urine Presumptive Negative Presumptive Negative    Barbiturate Screen, Urine Presumptive Negative Presumptive Negative    Benzodiazepines Screen, Urine Presumptive Negative Presumptive Negative    Cannabinoid Screen, Urine Presumptive Negative Presumptive Negative    Cocaine Metabolite Screen, Urine Presumptive Negative Presumptive Negative    Fentanyl Screen, Urine Presumptive Negative Presumptive Negative    Opiate Screen, Urine Presumptive Negative Presumptive Negative    Oxycodone Screen, Urine Presumptive Negative Presumptive Negative    PCP Screen, Urine Presumptive Negative Presumptive Negative   CBC and Auto Differential   Result Value Ref Range    WBC 8.9 4.4 - 11.3 x10*3/uL    nRBC 0.0 0.0 - 0.0 /100 WBCs    RBC 5.75 4.50 - 5.90 x10*6/uL    Hemoglobin 15.8 13.5 - 17.5 g/dL    Hematocrit 48.1 41.0 - 52.0 %    MCV 84 80 - 100 fL    MCH 27.5 26.0 - 34.0 pg    MCHC 32.8 32.0 - 36.0 g/dL    RDW 14.1 11.5 - 14.5 %    Platelets 254 150 - 450 x10*3/uL    Neutrophils % 51.8 40.0 - 80.0 %    Immature Granulocytes %, Automated 0.3 0.0 - 0.9 %    Lymphocytes % 36.7 13.0 - 44.0 %    Monocytes % 8.8 2.0 - 10.0 %    Eosinophils % 1.6 0.0 - 6.0 %    Basophils % 0.8 0.0 - 2.0 %    Neutrophils Absolute 4.62 1.20 - 7.70 x10*3/uL    Immature Granulocytes Absolute, Automated 0.03 0.00 - 0.70 x10*3/uL    Lymphocytes Absolute 3.27 1.20 - 4.80 x10*3/uL    Monocytes Absolute 0.78 0.10 - 1.00 x10*3/uL    Eosinophils Absolute 0.14 0.00 - 0.70 x10*3/uL    Basophils Absolute 0.07 0.00 - 0.10 x10*3/uL   Basic Metabolic Panel   Result Value Ref Range    Glucose 108 (H) 74 - 99 mg/dL    Sodium 138 136 - 145 mmol/L    Potassium 3.9 3.5 - 5.3 mmol/L    Chloride 107 98 - 107 mmol/L    Bicarbonate 21 21 - 32 mmol/L    Anion Gap 14 10 - 20 mmol/L    Urea Nitrogen 17 6 - 23 mg/dL    Creatinine 1.33 (H) 0.50 - 1.30 mg/dL    eGFR 59 (L) >60 mL/min/1.73m*2     Calcium 8.7 8.6 - 10.3 mg/dL   SST TOP   Result Value Ref Range    Extra Tube Hold for add-ons.    POCT GLUCOSE   Result Value Ref Range    POCT Glucose 128 (H) 74 - 99 mg/dL         I have personally reviewed the following imaging results:  No CT head results found for the past 14 days   MR brain wo IV contrast    Result Date: 11/17/2023  Interpreted By:  Liu Centeno, STUDY: MR BRAIN WO IV CONTRAST;  11/17/2023 10:48 pm   INDICATION: Signs/Symptoms:stroke.   COMPARISON: Noncontrast CT head of 11/17/2023.   ACCESSION NUMBER(S): [id number]   ORDERING CLINICIAN: SASHA CALDERON   TECHNIQUE: Axial T2, FLAIR, DWI, gradient echo T2 and sagittal and coronal T1 weighted images of brain were acquired.   FINDINGS: Exam is limited by motion artifact.   CSF Spaces: The ventricles, sulci and basal cisterns are within normal limits.   Parenchyma: There is focal reduced diffusion compatible with acute to subacute ischemia in right corona radiata extending into lateral aspect of the lentiform nucleus and medial aspect of external capsule. There is a background of confluence periventricular white matter T2/FLAIR hyperintense signal abnormality extending into subcortical white matter compatible with advanced chronic microvascular ischemia. There are cystic spaces in the basal ganglia, some with associated FLAIR hyperintensity, compatible with combination of prominent perivascular spaces and chronic lacunar infarcts. No evidence of recent hemorrhage. There is no mass effect or midline shift.   Paranasal Sinuses and Mastoids: Visualized paranasal sinuses and mastoid air cells are unremarkable.       There is focal reduced diffusion compatible with acute to subacute ischemia in right corona radiata extending into lateral aspect of the lentiform nucleus and medial aspect of external capsule. No associated hemorrhage or mass effect.   MACRO: Liu Centeno discussed the significance and urgency of this critical finding by  telephone with  Blayne Graff on 11/17/2023 at 11:21 pm.  (**-RCF-**) Findings:  See findings.   Signed by: Liu Centeno 11/17/2023 11:22 PM Dictation workstation:   [id number]    No results found for this or any previous visit.      Assessment/Plan   Principal Problem:    Acute arterial ischemic stroke, multifocal, mult vascular territories (CMS/HCC)            Curry Ortega Jr., M.D., FAAN

## 2023-11-18 NOTE — H&P
History Of Present Illness  Román Marinelli is a 68 y.o. male presenting with fall and weakness,.  He is known to have a history of hypertension, history of cocaine abuse in the past, hyperlipidemia, smoker, and he lives with his family, early morning he was trying to get up, he could not he fell, and he was trying to call for help, his family could not hear, was later on found that he could not be helped to be up by the family, and he was weak, he was weak on the left side, he was also noted to be having facial drooping, he was sent to the emergency department by the ambulance, had a stroke protocol with CT angiogram, followed by MRI which showed right corona radiator stroke extending into the lentiform nucleus, he is being admitted here for further management.  Upon admission his systolic blood pressure was more than 200, he was allergic to beta-blockers, given the history of stroke, the blood pressure was controlled to acceptable level, though will not be able to control better,.  .     Past Medical History  He has a past medical history of Smoking.    Surgical History  He has no past surgical history on file.     Social history  Smoking  History of drug use in the past.  And alcohol use.    Family History  No family history on file.     Allergies  Lactose, Amlodipine, and Metoprolol    Review of Systems   Constitutional:  Positive for activity change. Negative for appetite change, chills, diaphoresis, fatigue and fever.   HENT: Negative.     Eyes: Negative.    Respiratory: Negative.     Cardiovascular: Negative.    Gastrointestinal: Negative.    Genitourinary: Negative.    Musculoskeletal:  Positive for gait problem. Negative for arthralgias and myalgias.   Neurological:  Positive for dizziness, facial asymmetry and weakness.   Psychiatric/Behavioral: Negative.          Physical Exam  Constitutional:       Appearance: Normal appearance.   HENT:      Head: Normocephalic and atraumatic.   Eyes:      Pupils: Pupils  "are equal, round, and reactive to light.   Cardiovascular:      Rate and Rhythm: Normal rate and regular rhythm.   Pulmonary:      Effort: Pulmonary effort is normal.      Breath sounds: Normal breath sounds.   Abdominal:      Palpations: Abdomen is soft. There is no mass.   Musculoskeletal:         General: No swelling or tenderness.      Cervical back: Normal range of motion and neck supple.   Neurological:      Mental Status: He is alert and oriented to person, place, and time.      Motor: Weakness present.      Coordination: Coordination abnormal.      Gait: Gait abnormal.   Psychiatric:         Mood and Affect: Mood normal.          Last Recorded Vitals  Blood pressure 111/76, pulse 85, temperature 36.3 °C (97.3 °F), temperature source Temporal, resp. rate 18, height 1.753 m (5' 9\"), weight 98.5 kg (217 lb 1.6 oz), SpO2 93 %.    Relevant Results  Results for orders placed or performed during the hospital encounter of 11/17/23 (from the past 96 hour(s))   CBC and Auto Differential   Result Value Ref Range    WBC 8.2 4.4 - 11.3 x10*3/uL    nRBC 0.0 0.0 - 0.0 /100 WBCs    RBC 5.34 4.50 - 5.90 x10*6/uL    Hemoglobin 14.4 13.5 - 17.5 g/dL    Hematocrit 44.5 41.0 - 52.0 %    MCV 83 80 - 100 fL    MCH 27.0 26.0 - 34.0 pg    MCHC 32.4 32.0 - 36.0 g/dL    RDW 13.9 11.5 - 14.5 %    Platelets 240 150 - 450 x10*3/uL    Neutrophils % 62.2 40.0 - 80.0 %    Immature Granulocytes %, Automated 0.2 0.0 - 0.9 %    Lymphocytes % 28.3 13.0 - 44.0 %    Monocytes % 8.1 2.0 - 10.0 %    Eosinophils % 0.6 0.0 - 6.0 %    Basophils % 0.6 0.0 - 2.0 %    Neutrophils Absolute 5.11 1.20 - 7.70 x10*3/uL    Immature Granulocytes Absolute, Automated 0.02 0.00 - 0.70 x10*3/uL    Lymphocytes Absolute 2.33 1.20 - 4.80 x10*3/uL    Monocytes Absolute 0.67 0.10 - 1.00 x10*3/uL    Eosinophils Absolute 0.05 0.00 - 0.70 x10*3/uL    Basophils Absolute 0.05 0.00 - 0.10 x10*3/uL   Comprehensive metabolic panel   Result Value Ref Range    Glucose 66 (L) 74 - " 99 mg/dL    Sodium 136 136 - 145 mmol/L    Potassium 2.1 (LL) 3.5 - 5.3 mmol/L    Chloride 115 (H) 98 - 107 mmol/L    Bicarbonate 16 (L) 21 - 32 mmol/L    Anion Gap 7 (L) 10 - 20 mmol/L    Urea Nitrogen 7 6 - 23 mg/dL    Creatinine 0.44 (L) 0.50 - 1.30 mg/dL    eGFR >90 >60 mL/min/1.73m*2    Calcium 4.3 (LL) 8.6 - 10.3 mg/dL    Albumin 2.0 (L) 3.4 - 5.0 g/dL    Alkaline Phosphatase 35 33 - 136 U/L    Total Protein 3.2 (L) 6.4 - 8.2 g/dL    AST 8 (L) 9 - 39 U/L    Bilirubin, Total 0.5 0.0 - 1.2 mg/dL    ALT 7 (L) 10 - 52 U/L   Troponin I, High Sensitivity   Result Value Ref Range    Troponin I, High Sensitivity 19 0 - 20 ng/L   Protime-INR   Result Value Ref Range    Protime 14.7 (H) 9.8 - 12.8 seconds    INR 1.3 (H) 0.9 - 1.1   APTT   Result Value Ref Range    aPTT 35 27 - 38 seconds   POCT GLUCOSE   Result Value Ref Range    POCT Glucose 102 (H) 74 - 99 mg/dL   Lipid Panel   Result Value Ref Range    Cholesterol 220 (H) 0 - 199 mg/dL    HDL-Cholesterol 53.9 mg/dL    Cholesterol/HDL Ratio 4.1     LDL Calculated 138 (H) <=99 mg/dL    VLDL 28 0 - 40 mg/dL    Triglycerides 142 0 - 149 mg/dL    Non HDL Cholesterol 166 (H) 0 - 149 mg/dL   Hemoglobin A1C   Result Value Ref Range    Hemoglobin A1C 5.9 (H) see below %    Estimated Average Glucose 123 Not Established mg/dL   B-Type Natriuretic Peptide   Result Value Ref Range     (H) 0 - 99 pg/mL   Creatine Kinase   Result Value Ref Range    Creatine Kinase 214 0 - 325 U/L   POCT GLUCOSE   Result Value Ref Range    POCT Glucose 119 (H) 74 - 99 mg/dL   Urinalysis with Reflex Microscopic   Result Value Ref Range    Color, Urine Yellow Straw, Yellow    Appearance, Urine Clear Clear    Specific Gravity, Urine 1.032 1.005 - 1.035    pH, Urine 6.0 5.0, 5.5, 6.0, 6.5, 7.0, 7.5, 8.0    Protein, Urine NEGATIVE NEGATIVE mg/dL    Glucose, Urine NEGATIVE NEGATIVE mg/dL    Blood, Urine NEGATIVE NEGATIVE    Ketones, Urine 5 (TRACE) (A) NEGATIVE mg/dL    Bilirubin, Urine NEGATIVE  NEGATIVE    Urobilinogen, Urine <2.0 <2.0 mg/dL    Nitrite, Urine NEGATIVE NEGATIVE    Leukocyte Esterase, Urine TRACE (A) NEGATIVE   Microscopic Only, Urine   Result Value Ref Range    WBC, Urine 6-10 (A) 1-5, NONE /HPF    RBC, Urine NONE NONE, 1-2, 3-5 /HPF   Drug Screen, Urine   Result Value Ref Range    Amphetamine Screen, Urine Presumptive Negative Presumptive Negative    Barbiturate Screen, Urine Presumptive Negative Presumptive Negative    Benzodiazepines Screen, Urine Presumptive Negative Presumptive Negative    Cannabinoid Screen, Urine Presumptive Negative Presumptive Negative    Cocaine Metabolite Screen, Urine Presumptive Negative Presumptive Negative    Fentanyl Screen, Urine Presumptive Negative Presumptive Negative    Opiate Screen, Urine Presumptive Negative Presumptive Negative    Oxycodone Screen, Urine Presumptive Negative Presumptive Negative    PCP Screen, Urine Presumptive Negative Presumptive Negative   CBC and Auto Differential   Result Value Ref Range    WBC 8.9 4.4 - 11.3 x10*3/uL    nRBC 0.0 0.0 - 0.0 /100 WBCs    RBC 5.75 4.50 - 5.90 x10*6/uL    Hemoglobin 15.8 13.5 - 17.5 g/dL    Hematocrit 48.1 41.0 - 52.0 %    MCV 84 80 - 100 fL    MCH 27.5 26.0 - 34.0 pg    MCHC 32.8 32.0 - 36.0 g/dL    RDW 14.1 11.5 - 14.5 %    Platelets 254 150 - 450 x10*3/uL    Neutrophils % 51.8 40.0 - 80.0 %    Immature Granulocytes %, Automated 0.3 0.0 - 0.9 %    Lymphocytes % 36.7 13.0 - 44.0 %    Monocytes % 8.8 2.0 - 10.0 %    Eosinophils % 1.6 0.0 - 6.0 %    Basophils % 0.8 0.0 - 2.0 %    Neutrophils Absolute 4.62 1.20 - 7.70 x10*3/uL    Immature Granulocytes Absolute, Automated 0.03 0.00 - 0.70 x10*3/uL    Lymphocytes Absolute 3.27 1.20 - 4.80 x10*3/uL    Monocytes Absolute 0.78 0.10 - 1.00 x10*3/uL    Eosinophils Absolute 0.14 0.00 - 0.70 x10*3/uL    Basophils Absolute 0.07 0.00 - 0.10 x10*3/uL   Basic Metabolic Panel   Result Value Ref Range    Glucose 108 (H) 74 - 99 mg/dL    Sodium 138 136 - 145 mmol/L     Potassium 3.9 3.5 - 5.3 mmol/L    Chloride 107 98 - 107 mmol/L    Bicarbonate 21 21 - 32 mmol/L    Anion Gap 14 10 - 20 mmol/L    Urea Nitrogen 17 6 - 23 mg/dL    Creatinine 1.33 (H) 0.50 - 1.30 mg/dL    eGFR 59 (L) >60 mL/min/1.73m*2    Calcium 8.7 8.6 - 10.3 mg/dL   SST TOP   Result Value Ref Range    Extra Tube Hold for add-ons.    POCT GLUCOSE   Result Value Ref Range    POCT Glucose 128 (H) 74 - 99 mg/dL      MR brain wo IV contrast    Result Date: 11/17/2023  Interpreted By:  Liu Centeno, STUDY: MR BRAIN WO IV CONTRAST;  11/17/2023 10:48 pm   INDICATION: Signs/Symptoms:stroke.   COMPARISON: Noncontrast CT head of 11/17/2023.   ACCESSION NUMBER(S): [id number]   ORDERING CLINICIAN: SASHA CALDERON   TECHNIQUE: Axial T2, FLAIR, DWI, gradient echo T2 and sagittal and coronal T1 weighted images of brain were acquired.   FINDINGS: Exam is limited by motion artifact.   CSF Spaces: The ventricles, sulci and basal cisterns are within normal limits.   Parenchyma: There is focal reduced diffusion compatible with acute to subacute ischemia in right corona radiata extending into lateral aspect of the lentiform nucleus and medial aspect of external capsule. There is a background of confluence periventricular white matter T2/FLAIR hyperintense signal abnormality extending into subcortical white matter compatible with advanced chronic microvascular ischemia. There are cystic spaces in the basal ganglia, some with associated FLAIR hyperintensity, compatible with combination of prominent perivascular spaces and chronic lacunar infarcts. No evidence of recent hemorrhage. There is no mass effect or midline shift.   Paranasal Sinuses and Mastoids: Visualized paranasal sinuses and mastoid air cells are unremarkable.       There is focal reduced diffusion compatible with acute to subacute ischemia in right corona radiata extending into lateral aspect of the lentiform nucleus and medial aspect of external capsule. No  associated hemorrhage or mass effect.   MACRO: Liu Jacobo discussed the significance and urgency of this critical finding by telephone with  Blayne Graff on 11/17/2023 at 11:21 pm.  (**-RCF-**) Findings:  See findings.   Signed by: Liu Centeno 11/17/2023 11:22 PM Dictation workstation:   [id number]    CT brain attack head wo IV contrast    Result Date: 11/17/2023  Interpreted By:  Bebeto Yadav and Tavana Shahrzad STUDY: CT ANGIO BRAIN ATTACK NECK W IV CONTRAST AND POST PROCEDURE; CT ANGIO BRAIN ATTACK HEAD W IV CONTRAST AND POST PROCEDURE; CT BRAIN ATTACK HEAD WO IV CONTRAST;  11/17/2023 2:05 pm; 11/17/2023 2:03 pm   INDICATION: Signs/Symptoms:Stroke Evaluation with VAN Positive; Signs/Symptoms:Stroke Evaluation   Left hemiplegia   COMPARISON: None.   ACCESSION NUMBER(S): [id number]; [id number]; [id number]   ORDERING CLINICIAN: REBECCA MOON   TECHNIQUE: Multiple contiguous axial noncontrast images of the head were obtained. Following IV contrast administration of 75 cc Omnipaque 350, a CT angiography of the head and neck was performed. MIPS and 3D reconstructions of the [place] of [place] and neck were created on an independent workstation and reviewed.   FINDINGS: NON-CONTRAST HEAD CT:   BRAIN PARENCHYMA: There are confluent areas hypoattenuation within the right cerebral hemisphere, nonspecific, but likely representing chronic microangiopathic changes in this age demographic.. Lacunar infarcts are noted in the basal ganglia bilaterally as well as the right centrum semiovale. No evidence of loss of gray-white matter differentiation. No evidence of acute intraparenchymal hemorrhage or parenchymal evidence of an acute large territory ischemic infarct. No mass-effect, midline shift or effacement of cerebral sulci.   VENTRICLES and EXTRA-AXIAL SPACES:  No acute extra-axial or intraventricular hemorrhage. Ventricles and sulci are age-concordant.Mild ventriculomegaly with disproportionate enlargement of  the sylvian fissures and crowding of the sulci at the vertices, which may be seen in the setting of normal pressure hydrocephalus.   PARANASAL SINUSES/MASTOIDS:  No hemorrhage or air-fluid levels within the visualized paranasal sinuses. The mastoids are well aerated.   CALVARIUM/ORBITS:  No skull fracture.  The orbits and globes are intact to the extent visualized.   EXTRACRANIAL SOFT TISSUES: No discernible abnormality.       CTA NECK:   Two vessel aortic arch, with common origin of the left common carotid and the brachiocephalic trunk. The origins of the arch vessels are widely patent.   LEFT VERTEBRAL ARTERY:  No hemodynamically significant stenosis, occlusion, or dissection.   LEFT COMMON/INTERNAL CAROTID ARTERY: The common carotid artery is patent.  Atherosclerotic calcification noted involving the carotid bulb extending into the proximal internal carotid artery resulting in 0% stenosis by NASCET criteria. Otherwise, no hemodynamically significant stenosis, occlusion, or dissection.   RIGHT VERTEBRAL ARTERY:  No hemodynamically significant stenosis, occlusion, or dissection.   RIGHT COMMON/INTERNAL CAROTID ARTERY: The common carotid artery is patent.  Atherosclerotic calcification noted involving the carotid bulb extending into the proximal internal carotid artery resulting in 0% stenosis by NASCET criteria. Otherwise, no hemodynamically significant stenosis, occlusion, or dissection.     The neck soft tissues show no evidence of mass, fluid collection, or enlarged lymph nodes. There is no acute osseous abnormality. Moderate degenerative changes of the cervical spine noted. Incidental note of an azygous fissure in the right upper lobe. Prominent periapical lucency is noted involving the premolar and molar mandibular teeth on the right.       CTA HEAD:   ANTERIOR CIRCULATION: No aneurysm.   - Internal Carotid Arteries: Atherosclerotic calcification is noted involving the right lacerum, cavernous, and clinoid  right ICA without hemodynamically significant stenosis. Atherosclerotic calcifications noted involving the cavernous and ophthalmic segments of the left ICA resulting in mild narrowing in the mid cavernous segment. Otherwise, no hemodynamically significant stenosis or occlusion.   - Middle Cerebral Arteries:  Mild narrowing of the left M1 segment (series 501, image 626). Possible minimal narrowing of the right M1 segment (series 501, image 639).   - Anterior Cerebral Arteries:  No hemodynamically significant stenosis or occlusion.     POSTERIOR CIRCULATION: No aneurysm.   - Intracranial Vertebral Arteries:  No hemodynamically significant stenosis or occlusion.   - Basilar Artery:  No hemodynamically significant stenosis or occlusion. Mild focal dolichoectasia of the mid basilar artery (series 501, image 606).   - Posterior Cerebral Arteries: Bilateral posterior communicating arteries are opacified. No hemodynamically significant stenosis or occlusion.           1. Multiple well-circumscribed hypodensities within the right right-greater-than-left basal ganglia and deep white matter, likely representing chronic lacunar infarcts. However, superimposed acute component can not be excluded. If there is high clinical suspicion, consider MRI for further evaluation. 2. Mild ventriculomegaly with disproportionate enlargement of the sylvian fissures and crowding of the sulci at the vertices, which may be seen in the setting of normal pressure hydrocephalus. Correlation with clinical exam recommended. 3. Periventricular white matter confluent hypodensities are nonspecific, but likely representing chronic microangiopathic changes in this age demographic. 4. No evidence of source vessel arterial occlusion, flow significant stenosis, dissection, or aneurysm within the head and neck. Mild atherosclerotic changes of the intracranial vasculature as above. 5. Prominent periapical lucency is noted involving the premolar and molar  mandibular teeth on the right, suggestive of periodontal disease. 6. Additional findings as above.   I personally reviewed the images/study and I agree with the findings as stated. This study was interpreted at [facility], [place], [place].   MACRO: None   Signed by: Bebeto Yadav 11/17/2023 2:34 PM Dictation workstation:   [id number]    CT angio brain attack head w IV contrast and post procedure    Result Date: 11/17/2023  Interpreted By:  Bebeto Yadav,  Jovanni Mercado STUDY: CT ANGIO BRAIN ATTACK NECK W IV CONTRAST AND POST PROCEDURE; CT ANGIO BRAIN ATTACK HEAD W IV CONTRAST AND POST PROCEDURE; CT BRAIN ATTACK HEAD WO IV CONTRAST;  11/17/2023 2:05 pm; 11/17/2023 2:03 pm   INDICATION: Signs/Symptoms:Stroke Evaluation with VAN Positive; Signs/Symptoms:Stroke Evaluation   Left hemiplegia   COMPARISON: None.   ACCESSION NUMBER(S): [id number]; [id number]; [id number]   ORDERING CLINICIAN: REBECCA MOON   TECHNIQUE: Multiple contiguous axial noncontrast images of the head were obtained. Following IV contrast administration of 75 cc Omnipaque 350, a CT angiography of the head and neck was performed. MIPS and 3D reconstructions of the [place] and neck were created on an independent workstation and reviewed.   FINDINGS: NON-CONTRAST HEAD CT:   BRAIN PARENCHYMA: There are confluent areas hypoattenuation within the right cerebral hemisphere, nonspecific, but likely representing chronic microangiopathic changes in this age demographic.. Lacunar infarcts are noted in the basal ganglia bilaterally as well as the right centrum semiovale. No evidence of loss of gray-white matter differentiation. No evidence of acute intraparenchymal hemorrhage or parenchymal evidence of an acute large territory ischemic infarct. No mass-effect, midline shift or effacement of cerebral sulci.   VENTRICLES and EXTRA-AXIAL SPACES:  No acute extra-axial or intraventricular hemorrhage. Ventricles  and sulci are age-concordant.Mild ventriculomegaly with disproportionate enlargement of the sylvian fissures and crowding of the sulci at the vertices, which may be seen in the setting of normal pressure hydrocephalus.   PARANASAL SINUSES/MASTOIDS:  No hemorrhage or air-fluid levels within the visualized paranasal sinuses. The mastoids are well aerated.   CALVARIUM/ORBITS:  No skull fracture.  The orbits and globes are intact to the extent visualized.   EXTRACRANIAL SOFT TISSUES: No discernible abnormality.       CTA NECK:   Two vessel aortic arch, with common origin of the left common carotid and the brachiocephalic trunk. The origins of the arch vessels are widely patent.   LEFT VERTEBRAL ARTERY:  No hemodynamically significant stenosis, occlusion, or dissection.   LEFT COMMON/INTERNAL CAROTID ARTERY: The common carotid artery is patent.  Atherosclerotic calcification noted involving the carotid bulb extending into the proximal internal carotid artery resulting in 0% stenosis by NASCET criteria. Otherwise, no hemodynamically significant stenosis, occlusion, or dissection.   RIGHT VERTEBRAL ARTERY:  No hemodynamically significant stenosis, occlusion, or dissection.   RIGHT COMMON/INTERNAL CAROTID ARTERY: The common carotid artery is patent.  Atherosclerotic calcification noted involving the carotid bulb extending into the proximal internal carotid artery resulting in 0% stenosis by NASCET criteria. Otherwise, no hemodynamically significant stenosis, occlusion, or dissection.     The neck soft tissues show no evidence of mass, fluid collection, or enlarged lymph nodes. There is no acute osseous abnormality. Moderate degenerative changes of the cervical spine noted. Incidental note of an azygous fissure in the right upper lobe. Prominent periapical lucency is noted involving the premolar and molar mandibular teeth on the right.       CTA HEAD:   ANTERIOR CIRCULATION: No aneurysm.   - Internal Carotid Arteries:  Atherosclerotic calcification is noted involving the right lacerum, cavernous, and clinoid right ICA without hemodynamically significant stenosis. Atherosclerotic calcifications noted involving the cavernous and ophthalmic segments of the left ICA resulting in mild narrowing in the mid cavernous segment. Otherwise, no hemodynamically significant stenosis or occlusion.   - Middle Cerebral Arteries:  Mild narrowing of the left M1 segment (series 501, image 626). Possible minimal narrowing of the right M1 segment (series 501, image 639).   - Anterior Cerebral Arteries:  No hemodynamically significant stenosis or occlusion.     POSTERIOR CIRCULATION: No aneurysm.   - Intracranial Vertebral Arteries:  No hemodynamically significant stenosis or occlusion.   - Basilar Artery:  No hemodynamically significant stenosis or occlusion. Mild focal dolichoectasia of the mid basilar artery (series 501, image 606).   - Posterior Cerebral Arteries: Bilateral posterior communicating arteries are opacified. No hemodynamically significant stenosis or occlusion.           1. Multiple well-circumscribed hypodensities within the right right-greater-than-left basal ganglia and deep white matter, likely representing chronic lacunar infarcts. However, superimposed acute component can not be excluded. If there is high clinical suspicion, consider MRI for further evaluation. 2. Mild ventriculomegaly with disproportionate enlargement of the sylvian fissures and crowding of the sulci at the vertices, which may be seen in the setting of normal pressure hydrocephalus. Correlation with clinical exam recommended. 3. Periventricular white matter confluent hypodensities are nonspecific, but likely representing chronic microangiopathic changes in this age demographic. 4. No evidence of source vessel arterial occlusion, flow significant stenosis, dissection, or aneurysm within the head and neck. Mild atherosclerotic changes of the intracranial  vasculature as above. 5. Prominent periapical lucency is noted involving the premolar and molar mandibular teeth on the right, suggestive of periodontal disease. 6. Additional findings as above.   I personally reviewed the images/study and I agree with the findings as stated. This study was interpreted at [place], [place].   MACRO: None   Signed by: Bebeto Yadav 11/17/2023 2:34 PM Dictation workstation:   [id number]    CT angio brain attack neck w IV contrast and post procedure    Result Date: 11/17/2023  Interpreted By:  Bebeto Yadav,  Jovanni Mercado STUDY: CT ANGIO BRAIN ATTACK NECK W IV CONTRAST AND POST PROCEDURE; CT ANGIO BRAIN ATTACK HEAD W IV CONTRAST AND POST PROCEDURE; CT BRAIN ATTACK HEAD WO IV CONTRAST;  11/17/2023 2:05 pm; 11/17/2023 2:03 pm   INDICATION: Signs/Symptoms:Stroke Evaluation with VAN Positive; Signs/Symptoms:Stroke Evaluation   Left hemiplegia   COMPARISON: None.   ACCESSION NUMBER(S): [id number]; [id number]; [id number]   ORDERING CLINICIAN: REBECCA MOON   TECHNIQUE: Multiple contiguous axial noncontrast images of the head were obtained. Following IV contrast administration of 75 cc Omnipaque 350, a CT angiography of the head and neck was performed. MIPS and 3D reconstructions of the [place] of [place] and neck were created on an independent workstation and reviewed.   FINDINGS: NON-CONTRAST HEAD CT:   BRAIN PARENCHYMA: There are confluent areas hypoattenuation within the right cerebral hemisphere, nonspecific, but likely representing chronic microangiopathic changes in this age demographic.. Lacunar infarcts are noted in the basal ganglia bilaterally as well as the right centrum semiovale. No evidence of loss of gray-white matter differentiation. No evidence of acute intraparenchymal hemorrhage or parenchymal evidence of an acute large territory ischemic infarct. No mass-effect, midline shift or effacement of cerebral sulci.    VENTRICLES and EXTRA-AXIAL SPACES:  No acute extra-axial or intraventricular hemorrhage. Ventricles and sulci are age-concordant.Mild ventriculomegaly with disproportionate enlargement of the sylvian fissures and crowding of the sulci at the vertices, which may be seen in the setting of normal pressure hydrocephalus.   PARANASAL SINUSES/MASTOIDS:  No hemorrhage or air-fluid levels within the visualized paranasal sinuses. The mastoids are well aerated.   CALVARIUM/ORBITS:  No skull fracture.  The orbits and globes are intact to the extent visualized.   EXTRACRANIAL SOFT TISSUES: No discernible abnormality.       CTA NECK:   Two vessel aortic arch, with common origin of the left common carotid and the brachiocephalic trunk. The origins of the arch vessels are widely patent.   LEFT VERTEBRAL ARTERY:  No hemodynamically significant stenosis, occlusion, or dissection.   LEFT COMMON/INTERNAL CAROTID ARTERY: The common carotid artery is patent.  Atherosclerotic calcification noted involving the carotid bulb extending into the proximal internal carotid artery resulting in 0% stenosis by NASCET criteria. Otherwise, no hemodynamically significant stenosis, occlusion, or dissection.   RIGHT VERTEBRAL ARTERY:  No hemodynamically significant stenosis, occlusion, or dissection.   RIGHT COMMON/INTERNAL CAROTID ARTERY: The common carotid artery is patent.  Atherosclerotic calcification noted involving the carotid bulb extending into the proximal internal carotid artery resulting in 0% stenosis by NASCET criteria. Otherwise, no hemodynamically significant stenosis, occlusion, or dissection.     The neck soft tissues show no evidence of mass, fluid collection, or enlarged lymph nodes. There is no acute osseous abnormality. Moderate degenerative changes of the cervical spine noted. Incidental note of an azygous fissure in the right upper lobe. Prominent periapical lucency is noted involving the premolar and molar mandibular teeth on  the right.       CTA HEAD:   ANTERIOR CIRCULATION: No aneurysm.   - Internal Carotid Arteries: Atherosclerotic calcification is noted involving the right lacerum, cavernous, and clinoid right ICA without hemodynamically significant stenosis. Atherosclerotic calcifications noted involving the cavernous and ophthalmic segments of the left ICA resulting in mild narrowing in the mid cavernous segment. Otherwise, no hemodynamically significant stenosis or occlusion.   - Middle Cerebral Arteries:  Mild narrowing of the left M1 segment (series 501, image 626). Possible minimal narrowing of the right M1 segment (series 501, image 639).   - Anterior Cerebral Arteries:  No hemodynamically significant stenosis or occlusion.     POSTERIOR CIRCULATION: No aneurysm.   - Intracranial Vertebral Arteries:  No hemodynamically significant stenosis or occlusion.   - Basilar Artery:  No hemodynamically significant stenosis or occlusion. Mild focal dolichoectasia of the mid basilar artery (series 501, image 606).   - Posterior Cerebral Arteries: Bilateral posterior communicating arteries are opacified. No hemodynamically significant stenosis or occlusion.           1. Multiple well-circumscribed hypodensities within the right right-greater-than-left basal ganglia and deep white matter, likely representing chronic lacunar infarcts. However, superimposed acute component can not be excluded. If there is high clinical suspicion, consider MRI for further evaluation. 2. Mild ventriculomegaly with disproportionate enlargement of the sylvian fissures and crowding of the sulci at the vertices, which may be seen in the setting of normal pressure hydrocephalus. Correlation with clinical exam recommended. 3. Periventricular white matter confluent hypodensities are nonspecific, but likely representing chronic microangiopathic changes in this age demographic. 4. No evidence of source vessel arterial occlusion, flow significant stenosis, dissection, or  aneurysm within the head and neck. Mild atherosclerotic changes of the intracranial vasculature as above. 5. Prominent periapical lucency is noted involving the premolar and molar mandibular teeth on the right, suggestive of periodontal disease. 6. Additional findings as above.   I personally reviewed the images/study and I agree with the findings as stated. This study was interpreted at [place], [place].   MACRO: None   Signed by: Bebeto Yadav 11/17/2023 2:34 PM Dictation workstation:   [id number]      Medications  aspirin, 81 mg, oral, Daily  atorvastatin, 80 mg, oral, Nightly  enoxaparin, 40 mg, subcutaneous, Daily  melatonin, 3 mg, oral, Daily  sennosides, 2 tablet, oral, BID       PRN medications: acetaminophen **OR** acetaminophen **OR** acetaminophen, acetaminophen **OR** acetaminophen **OR** acetaminophen, hydrALAZINE, labetaloL, magnesium hydroxide, ondansetron **OR** ondansetron, oxygen     Assessment/Plan   Principal Problem:          I spent 75 minutes in the professional and overall care of this patient.    Brandi Titus MD

## 2023-11-19 LAB
ANION GAP SERPL CALC-SCNC: 13 MMOL/L (ref 10–20)
BUN SERPL-MCNC: 28 MG/DL (ref 6–23)
CALCIUM SERPL-MCNC: 8 MG/DL (ref 8.6–10.3)
CHLORIDE SERPL-SCNC: 108 MMOL/L (ref 98–107)
CO2 SERPL-SCNC: 20 MMOL/L (ref 21–32)
CREAT SERPL-MCNC: 1.28 MG/DL (ref 0.5–1.3)
ERYTHROCYTE [DISTWIDTH] IN BLOOD BY AUTOMATED COUNT: 14.4 % (ref 11.5–14.5)
GFR SERPL CREATININE-BSD FRML MDRD: 62 ML/MIN/1.73M*2
GLUCOSE BLD MANUAL STRIP-MCNC: 101 MG/DL (ref 74–99)
GLUCOSE BLD MANUAL STRIP-MCNC: 105 MG/DL (ref 74–99)
GLUCOSE BLD MANUAL STRIP-MCNC: 110 MG/DL (ref 74–99)
GLUCOSE SERPL-MCNC: 105 MG/DL (ref 74–99)
HCT VFR BLD AUTO: 46.3 % (ref 41–52)
HGB BLD-MCNC: 14.9 G/DL (ref 13.5–17.5)
MAGNESIUM SERPL-MCNC: 2 MG/DL (ref 1.6–2.4)
MCH RBC QN AUTO: 27.5 PG (ref 26–34)
MCHC RBC AUTO-ENTMCNC: 32.2 G/DL (ref 32–36)
MCV RBC AUTO: 85 FL (ref 80–100)
NRBC BLD-RTO: 0 /100 WBCS (ref 0–0)
PLATELET # BLD AUTO: 254 X10*3/UL (ref 150–450)
POTASSIUM SERPL-SCNC: 4 MMOL/L (ref 3.5–5.3)
RBC # BLD AUTO: 5.42 X10*6/UL (ref 4.5–5.9)
SODIUM SERPL-SCNC: 137 MMOL/L (ref 136–145)
WBC # BLD AUTO: 7.2 X10*3/UL (ref 4.4–11.3)

## 2023-11-19 PROCEDURE — 2500000001 HC RX 250 WO HCPCS SELF ADMINISTERED DRUGS (ALT 637 FOR MEDICARE OP): Performed by: INTERNAL MEDICINE

## 2023-11-19 PROCEDURE — 2500000004 HC RX 250 GENERAL PHARMACY W/ HCPCS (ALT 636 FOR OP/ED): Performed by: INTERNAL MEDICINE

## 2023-11-19 PROCEDURE — 82947 ASSAY GLUCOSE BLOOD QUANT: CPT

## 2023-11-19 PROCEDURE — 96372 THER/PROPH/DIAG INJ SC/IM: CPT | Performed by: INTERNAL MEDICINE

## 2023-11-19 PROCEDURE — 36415 COLL VENOUS BLD VENIPUNCTURE: CPT | Performed by: INTERNAL MEDICINE

## 2023-11-19 PROCEDURE — 80048 BASIC METABOLIC PNL TOTAL CA: CPT | Performed by: INTERNAL MEDICINE

## 2023-11-19 PROCEDURE — 85027 COMPLETE CBC AUTOMATED: CPT | Performed by: INTERNAL MEDICINE

## 2023-11-19 PROCEDURE — 2060000001 HC INTERMEDIATE ICU ROOM DAILY

## 2023-11-19 RX ADMIN — ENOXAPARIN SODIUM 40 MG: 40 INJECTION SUBCUTANEOUS at 08:52

## 2023-11-19 RX ADMIN — Medication 3 MG: at 21:29

## 2023-11-19 RX ADMIN — ASPIRIN 81 MG: 81 TABLET, COATED ORAL at 08:52

## 2023-11-19 RX ADMIN — CLOPIDOGREL 75 MG: 75 TABLET ORAL at 08:52

## 2023-11-19 RX ADMIN — ATORVASTATIN CALCIUM 80 MG: 80 TABLET, FILM COATED ORAL at 21:29

## 2023-11-19 ASSESSMENT — PAIN SCALES - GENERAL
PAINLEVEL_OUTOF10: 0 - NO PAIN

## 2023-11-19 ASSESSMENT — COGNITIVE AND FUNCTIONAL STATUS - GENERAL
DRESSING REGULAR UPPER BODY CLOTHING: A LOT
MOVING FROM LYING ON BACK TO SITTING ON SIDE OF FLAT BED WITH BEDRAILS: A LOT
TURNING FROM BACK TO SIDE WHILE IN FLAT BAD: A LITTLE
HELP NEEDED FOR BATHING: A LOT
DRESSING REGULAR LOWER BODY CLOTHING: TOTAL
CLIMB 3 TO 5 STEPS WITH RAILING: TOTAL
MOVING TO AND FROM BED TO CHAIR: TOTAL
PERSONAL GROOMING: A LOT
TOILETING: TOTAL
STANDING UP FROM CHAIR USING ARMS: TOTAL
DAILY ACTIVITIY SCORE: 12
MOBILITY SCORE: 9
WALKING IN HOSPITAL ROOM: TOTAL

## 2023-11-19 ASSESSMENT — PAIN - FUNCTIONAL ASSESSMENT
PAIN_FUNCTIONAL_ASSESSMENT: 0-10

## 2023-11-19 NOTE — PROGRESS NOTES
"Román Marinelli is a 68 y.o. male on day 2 of admission presenting with Acute arterial ischemic stroke, multifocal, mult vascular territories (CMS/HCC).  Right  corona radiata  stroke extending to lentiform nucleus, causing left-sided weakness    Subjective       He feels better, he is refusing to go to rehab    Objective     Physical Exam  Alert oriented and 3.  HEENT unremarkable.  Facial droop.  Heart S1-S2.  Lungs clear.  Left-sided weakness.    Last Recorded Vitals  Blood pressure 137/81, pulse 69, temperature 36.3 °C (97.3 °F), temperature source Temporal, resp. rate 18, height 1.753 m (5' 9\"), weight 99.2 kg (218 lb 11.1 oz), SpO2 96 %.  Intake/Output last 3 Shifts:  I/O last 3 completed shifts:  In: 120 (1.2 mL/kg) [P.O.:120]  Out: 350 (3.5 mL/kg) [Urine:350 (0.1 mL/kg/hr)]  Weight: 99.2 kg     Relevant Results  Results for orders placed or performed during the hospital encounter of 11/17/23 (from the past 96 hour(s))   CBC and Auto Differential   Result Value Ref Range    WBC 8.2 4.4 - 11.3 x10*3/uL    nRBC 0.0 0.0 - 0.0 /100 WBCs    RBC 5.34 4.50 - 5.90 x10*6/uL    Hemoglobin 14.4 13.5 - 17.5 g/dL    Hematocrit 44.5 41.0 - 52.0 %    MCV 83 80 - 100 fL    MCH 27.0 26.0 - 34.0 pg    MCHC 32.4 32.0 - 36.0 g/dL    RDW 13.9 11.5 - 14.5 %    Platelets 240 150 - 450 x10*3/uL    Neutrophils % 62.2 40.0 - 80.0 %    Immature Granulocytes %, Automated 0.2 0.0 - 0.9 %    Lymphocytes % 28.3 13.0 - 44.0 %    Monocytes % 8.1 2.0 - 10.0 %    Eosinophils % 0.6 0.0 - 6.0 %    Basophils % 0.6 0.0 - 2.0 %    Neutrophils Absolute 5.11 1.20 - 7.70 x10*3/uL    Immature Granulocytes Absolute, Automated 0.02 0.00 - 0.70 x10*3/uL    Lymphocytes Absolute 2.33 1.20 - 4.80 x10*3/uL    Monocytes Absolute 0.67 0.10 - 1.00 x10*3/uL    Eosinophils Absolute 0.05 0.00 - 0.70 x10*3/uL    Basophils Absolute 0.05 0.00 - 0.10 x10*3/uL   Comprehensive metabolic panel   Result Value Ref Range    Glucose 66 (L) 74 - 99 mg/dL    Sodium 136 136 - " 145 mmol/L    Potassium 2.1 (LL) 3.5 - 5.3 mmol/L    Chloride 115 (H) 98 - 107 mmol/L    Bicarbonate 16 (L) 21 - 32 mmol/L    Anion Gap 7 (L) 10 - 20 mmol/L    Urea Nitrogen 7 6 - 23 mg/dL    Creatinine 0.44 (L) 0.50 - 1.30 mg/dL    eGFR >90 >60 mL/min/1.73m*2    Calcium 4.3 (LL) 8.6 - 10.3 mg/dL    Albumin 2.0 (L) 3.4 - 5.0 g/dL    Alkaline Phosphatase 35 33 - 136 U/L    Total Protein 3.2 (L) 6.4 - 8.2 g/dL    AST 8 (L) 9 - 39 U/L    Bilirubin, Total 0.5 0.0 - 1.2 mg/dL    ALT 7 (L) 10 - 52 U/L   Troponin I, High Sensitivity   Result Value Ref Range    Troponin I, High Sensitivity 19 0 - 20 ng/L   Protime-INR   Result Value Ref Range    Protime 14.7 (H) 9.8 - 12.8 seconds    INR 1.3 (H) 0.9 - 1.1   APTT   Result Value Ref Range    aPTT 35 27 - 38 seconds   POCT GLUCOSE   Result Value Ref Range    POCT Glucose 102 (H) 74 - 99 mg/dL   Lipid Panel   Result Value Ref Range    Cholesterol 220 (H) 0 - 199 mg/dL    HDL-Cholesterol 53.9 mg/dL    Cholesterol/HDL Ratio 4.1     LDL Calculated 138 (H) <=99 mg/dL    VLDL 28 0 - 40 mg/dL    Triglycerides 142 0 - 149 mg/dL    Non HDL Cholesterol 166 (H) 0 - 149 mg/dL   Hemoglobin A1C   Result Value Ref Range    Hemoglobin A1C 5.9 (H) see below %    Estimated Average Glucose 123 Not Established mg/dL   B-Type Natriuretic Peptide   Result Value Ref Range     (H) 0 - 99 pg/mL   Creatine Kinase   Result Value Ref Range    Creatine Kinase 214 0 - 325 U/L   POCT GLUCOSE   Result Value Ref Range    POCT Glucose 119 (H) 74 - 99 mg/dL   Urinalysis with Reflex Microscopic   Result Value Ref Range    Color, Urine Yellow Straw, Yellow    Appearance, Urine Clear Clear    Specific Gravity, Urine 1.032 1.005 - 1.035    pH, Urine 6.0 5.0, 5.5, 6.0, 6.5, 7.0, 7.5, 8.0    Protein, Urine NEGATIVE NEGATIVE mg/dL    Glucose, Urine NEGATIVE NEGATIVE mg/dL    Blood, Urine NEGATIVE NEGATIVE    Ketones, Urine 5 (TRACE) (A) NEGATIVE mg/dL    Bilirubin, Urine NEGATIVE NEGATIVE    Urobilinogen, Urine  <2.0 <2.0 mg/dL    Nitrite, Urine NEGATIVE NEGATIVE    Leukocyte Esterase, Urine TRACE (A) NEGATIVE   Microscopic Only, Urine   Result Value Ref Range    WBC, Urine 6-10 (A) 1-5, NONE /HPF    RBC, Urine NONE NONE, 1-2, 3-5 /HPF   Drug Screen, Urine   Result Value Ref Range    Amphetamine Screen, Urine Presumptive Negative Presumptive Negative    Barbiturate Screen, Urine Presumptive Negative Presumptive Negative    Benzodiazepines Screen, Urine Presumptive Negative Presumptive Negative    Cannabinoid Screen, Urine Presumptive Negative Presumptive Negative    Cocaine Metabolite Screen, Urine Presumptive Negative Presumptive Negative    Fentanyl Screen, Urine Presumptive Negative Presumptive Negative    Opiate Screen, Urine Presumptive Negative Presumptive Negative    Oxycodone Screen, Urine Presumptive Negative Presumptive Negative    PCP Screen, Urine Presumptive Negative Presumptive Negative   CBC and Auto Differential   Result Value Ref Range    WBC 8.9 4.4 - 11.3 x10*3/uL    nRBC 0.0 0.0 - 0.0 /100 WBCs    RBC 5.75 4.50 - 5.90 x10*6/uL    Hemoglobin 15.8 13.5 - 17.5 g/dL    Hematocrit 48.1 41.0 - 52.0 %    MCV 84 80 - 100 fL    MCH 27.5 26.0 - 34.0 pg    MCHC 32.8 32.0 - 36.0 g/dL    RDW 14.1 11.5 - 14.5 %    Platelets 254 150 - 450 x10*3/uL    Neutrophils % 51.8 40.0 - 80.0 %    Immature Granulocytes %, Automated 0.3 0.0 - 0.9 %    Lymphocytes % 36.7 13.0 - 44.0 %    Monocytes % 8.8 2.0 - 10.0 %    Eosinophils % 1.6 0.0 - 6.0 %    Basophils % 0.8 0.0 - 2.0 %    Neutrophils Absolute 4.62 1.20 - 7.70 x10*3/uL    Immature Granulocytes Absolute, Automated 0.03 0.00 - 0.70 x10*3/uL    Lymphocytes Absolute 3.27 1.20 - 4.80 x10*3/uL    Monocytes Absolute 0.78 0.10 - 1.00 x10*3/uL    Eosinophils Absolute 0.14 0.00 - 0.70 x10*3/uL    Basophils Absolute 0.07 0.00 - 0.10 x10*3/uL   Basic Metabolic Panel   Result Value Ref Range    Glucose 108 (H) 74 - 99 mg/dL    Sodium 138 136 - 145 mmol/L    Potassium 3.9 3.5 - 5.3  mmol/L    Chloride 107 98 - 107 mmol/L    Bicarbonate 21 21 - 32 mmol/L    Anion Gap 14 10 - 20 mmol/L    Urea Nitrogen 17 6 - 23 mg/dL    Creatinine 1.33 (H) 0.50 - 1.30 mg/dL    eGFR 59 (L) >60 mL/min/1.73m*2    Calcium 8.7 8.6 - 10.3 mg/dL   SST TOP   Result Value Ref Range    Extra Tube Hold for add-ons.    POCT GLUCOSE   Result Value Ref Range    POCT Glucose 128 (H) 74 - 99 mg/dL   Transthoracic Echo (TTE) Complete   Result Value Ref Range    AV pk mathew 1.10     AV mn grad 3.0     LVOT diam 2.00     LV biplane EF 41     MV avg E/e' ratio 10.03     MV E/A ratio 0.53     LA vol index A/L 26.9     Tricuspid annular plane systolic excursion 2.1     RV free wall pk S' 13.00     LVIDd 5.40     Aortic Valve Area by Continuity of VTI 1.81     Aortic Valve Area by Continuity of Peak Velocity 1.98     AV pk grad 4.8     LV A4C EF 39.2    POCT GLUCOSE   Result Value Ref Range    POCT Glucose 129 (H) 74 - 99 mg/dL   POCT GLUCOSE   Result Value Ref Range    POCT Glucose 127 (H) 74 - 99 mg/dL   POCT GLUCOSE   Result Value Ref Range    POCT Glucose 159 (H) 74 - 99 mg/dL   Magnesium   Result Value Ref Range    Magnesium 2.00 1.60 - 2.40 mg/dL   POCT GLUCOSE   Result Value Ref Range    POCT Glucose 105 (H) 74 - 99 mg/dL        Medications:  aspirin, 81 mg, oral, Daily  atorvastatin, 80 mg, oral, Nightly  clopidogrel, 75 mg, oral, Daily  enoxaparin, 40 mg, subcutaneous, Daily  melatonin, 3 mg, oral, Daily  perflutren lipid microspheres, 0.5-10 mL of dilution, intravenous, Once in imaging  perflutren protein A microsphere, 0.5 mL, intravenous, Once in imaging  sennosides, 2 tablet, oral, BID  sulfur hexafluoride microsphr, 2 mL, intravenous, Once in imaging      PRN medications: acetaminophen **OR** acetaminophen **OR** acetaminophen, acetaminophen **OR** acetaminophen **OR** acetaminophen, hydrALAZINE, labetaloL, magnesium hydroxide, ondansetron **OR** ondansetron, oxygen    Transthoracic Echo (TTE) Complete    Result Date:  11/19/2023   [facility]– [place], [address] [place] [address]              Tel [phone] and Fax [phone] TRANSTHORACIC ECHOCARDIOGRAM REPORT  Patient Name:      PRABHAKAR LEWIS      Reading Physician:    [id number] Paulo Jain MD Study Date:        11/18/2023           Ordering Provider:    [id number]                                                               SASHA CALDERON MRN/PID:           [id number]             Fellow: Accession#:        [id number]         Nurse: Date of Birth/Age: 1955 / 68 years Sonographer:          Shelby Rowland RDCS Gender:            M                    Additional Staff: Height:            175.26 cm            Admit Date:           11/17/2023 Weight:            98.43 kg             Admission Status:     Inpatient -                                                               Routine BSA:               2.14 m2              Encounter#:           [id number]                                         Department Location:  Critical access hospital Non                                                               Invasive Blood Pressure: 146 /94 mmHg Study Type:    TRANSTHORACIC ECHO (TTE) COMPLETE Diagnosis/ICD: Other cerebral infarction-I63.89 Indication:    Stroke CPT Code:      Echo Complete w Full Doppler-00165 Patient History: Pertinent History: Chest Pain. Stroke, Acute Coronary Syndrome. Study Detail: The following Echo studies were performed: 2D, Doppler, M-Mode and               color flow. Technically challenging study due to poor acoustic               windows, the patient's lack of cooperation and patient lying in               supine position. Definity used as a contrast agent for endocardial               border definition and agitated saline used as a contrast agent for               intraseptal flow evaluation.  PHYSICIAN  INTERPRETATION: Left Ventricle: The left ventricular systolic function is moderately decreased, with an estimated ejection fraction of 35-40%. Wall motion is abnormal. The left ventricular cavity size is normal. Spectral Doppler shows an impaired relaxation pattern of left ventricular diastolic filling. Left Atrium: The left atrium is normal in size. There is no evidence of a patent foramen ovale. A bubble study using agitated saline was performed. Bubble study is negative. Right Ventricle: The right ventricle is normal in size. There is normal right ventricular global systolic function. Right Atrium: The right atrium is normal in size. Aortic Valve: The aortic valve is trileaflet. There is moderate aortic valve thickening. There is evidence of mild aortic valve stenosis. There is no evidence of aortic valve regurgitation. The peak instantaneous gradient of the aortic valve is 4.8 mmHg. The mean gradient of the aortic valve is 3.0 mmHg. Mitral Valve: The mitral valve is normal in structure. There is trace mitral valve regurgitation. Tricuspid Valve: The tricuspid valve is structurally normal. There is trace tricuspid regurgitation. Pulmonic Valve: The pulmonic valve is not well visualized. The pulmonic valve regurgitation was not well visualized. Pericardium: There is no pericardial effusion noted. Aorta: The aortic root is normal. Systemic Veins: The inferior vena cava appears to be of normal size. In comparison to the previous echocardiogram(s): There are no prior studies on this patient for comparison purposes.  CONCLUSIONS:  1. Left ventricular systolic function is moderately decreased with a 35-40% estimated ejection fraction.  2. Spectral Doppler shows an impaired relaxation pattern of left ventricular diastolic filling.  3. Mild aortic valve stenosis.  4. There is no evidence of a patent foramen ovale. QUANTITATIVE DATA SUMMARY: 2D MEASUREMENTS:                           Normal Ranges: Ao Root d:     3.30 cm     (2.0-3.7cm) LAs:           3.50 cm    (2.7-4.0cm) IVSd:          1.30 cm    (0.6-1.1cm) LVPWd:         0.90 cm    (0.6-1.1cm) LVIDd:         5.40 cm    (3.9-5.9cm) LVIDs:         4.60 cm LV Mass Index: 109.8 g/m2 LV % FS        14.8 % LA VOLUME:                               Normal Ranges: LA Vol A4C:        56.2 ml    (22+/-6mL/m2) LA Vol A2C:        57.5 ml LA Vol BP:         57.5 ml LA Vol Index A4C:  26.3ml/m2 LA Vol Index A2C:  26.9 ml/m2 LA Vol Index BP:   26.9 ml/m2 LA Area A4C:       17.8 cm2 LA Area A2C:       18.2 cm2 LA Major Axis A4C: 4.8 cm LA Major Axis A2C: 4.9 cm LA Volume Index:   26.9 ml/m2 RA VOLUME BY A/L METHOD:                               Normal Ranges: RA Vol A4C:        38.6 ml    (8.3-19.5ml) RA Vol Index A4C:  18.0 ml/m2 RA Area A4C:       14.0 cm2 RA Major Axis A4C: 4.3 cm M-MODE MEASUREMENTS:                  Normal Ranges: Ao Root: 3.10 cm (2.0-3.7cm) LAs:     3.50 cm (2.7-4.0cm) AORTA MEASUREMENTS:                      Normal Ranges: Ao Sinus, d: 3.30 cm (2.1-3.5cm) Ao STJ, d:   2.50 cm (1.7-3.4cm) Asc Ao, d:   3.50 cm (2.1-3.4cm) LV SYSTOLIC FUNCTION BY 2D PLANIMETRY (MOD):                     Normal Ranges: EF-A4C View: 39.2 % (>=55%) EF-A2C View: 40.7 % EF-Biplane:  40.5 % LV DIASTOLIC FUNCTION:                               Normal Ranges: MV Peak E:        0.30 m/s    (0.7-1.2 m/s) MV Peak A:        0.57 m/s    (0.42-0.7 m/s) E/A Ratio:        0.53        (1.0-2.2) MV e'             0.03 m/s    (>8.0) MV lateral e'     0.03 m/s MV medial e'      0.04 m/s MV A Dur:         100.00 msec E/e' Ratio:       10.03       (<8.0) a'                0.05 m/s PulmV Sys Ravi:    38.10 cm/s PulmV [place] Raiv:   20.80 cm/s PulmV S/D Ravi:    1.80 PulmV A Revs Ravi: 25.10 cm/s PulmV A Revs Dur: 106.00 msec MITRAL VALVE:                 Normal Ranges: MV DT: 333 msec (150-240msec) AORTIC VALVE:                                   Normal Ranges: AoV Vmax:                1.10 m/s (<=1.7m/s) AoV Peak PG:              4.8 mmHg (<20mmHg) AoV Mean PG:             3.0 mmHg (1.7-11.5mmHg) LVOT Max Ravi:            0.69 m/s (<=1.1m/s) AoV VTI:                 14.90 cm (18-25cm) LVOT VTI:                8.59 cm LVOT Diameter:           2.00 cm  (1.8-2.4cm) AoV Area, VTI:           1.81 cm2 (2.5-5.5cm2) AoV Area,Vmax:           1.98 cm2 (2.5-4.5cm2) AoV Dimensionless Index: 0.58  RIGHT VENTRICLE: RV Basal 3.58 cm RV Mid   2.66 cm RV Major 7.1 cm TAPSE:   20.6 mm RV s'    0.13 m/s TRICUSPID VALVE/RVSP:                            Normal Ranges: Est. RA Pressure: 3 mmHg TV E Vmax:        0.28 m/s (0.3-0.7m/s) TV A Vmax:        0.37 m/s IVC Diam:         1.05 cm PULMONIC VALVE:                         Normal Ranges: PV Accel Time: 63 msec  (>120ms) PV Max Ravi:    0.7 m/s  (0.6-0.9m/s) PV Max P.0 mmHg Pulmonary Veins: PulmV A Revs Dur: 106.00 msec PulmV A Revs Ravi: 25.10 cm/s PulmV [place] Ravi:   20.80 cm/s PulmV S/D Ravi:    1.80 PulmV Sys Ravi:    38.10 cm/s  [id number] Paulo Jain MD Electronically signed on 2023 at 7:42:25 AM  ** Final **     MR brain wo IV contrast    Result Date: 2023  Interpreted By:  Liu Centeno, STUDY: MR BRAIN WO IV CONTRAST;  2023 10:48 pm   INDICATION: Signs/Symptoms:stroke.   COMPARISON: Noncontrast CT head of 2023.   ACCESSION NUMBER(S): [id number]   ORDERING CLINICIAN: SASHA CALDERON   TECHNIQUE: Axial T2, FLAIR, DWI, gradient echo T2 and sagittal and coronal T1 weighted images of brain were acquired.   FINDINGS: Exam is limited by motion artifact.   CSF Spaces: The ventricles, sulci and basal cisterns are within normal limits.   Parenchyma: There is focal reduced diffusion compatible with acute to subacute ischemia in right corona radiata extending into lateral aspect of the lentiform nucleus and medial aspect of external capsule. There is a background of confluence periventricular white matter T2/FLAIR hyperintense signal abnormality extending into  subcortical white matter compatible with advanced chronic microvascular ischemia. There are cystic spaces in the basal ganglia, some with associated FLAIR hyperintensity, compatible with combination of prominent perivascular spaces and chronic lacunar infarcts. No evidence of recent hemorrhage. There is no mass effect or midline shift.   Paranasal Sinuses and Mastoids: Visualized paranasal sinuses and mastoid air cells are unremarkable.       There is focal reduced diffusion compatible with acute to subacute ischemia in right corona radiata extending into lateral aspect of the lentiform nucleus and medial aspect of external capsule. No associated hemorrhage or mass effect.   MACRO: Liu Centeno discussed the significance and urgency of this critical finding by telephone with  Blayne Graff on 11/17/2023 at 11:21 pm.  (**-RCF-**) Findings:  See findings.   Signed by: Liu Centeno 11/17/2023 11:22 PM Dictation workstation:   [id number]    CT brain attack head wo IV contrast    Result Date: 11/17/2023  Interpreted By:  Bebeto Yadav and Tavana Shahrzad STUDY: CT ANGIO BRAIN ATTACK NECK W IV CONTRAST AND POST PROCEDURE; CT ANGIO BRAIN ATTACK HEAD W IV CONTRAST AND POST PROCEDURE; CT BRAIN ATTACK HEAD WO IV CONTRAST;  11/17/2023 2:05 pm; 11/17/2023 2:03 pm   INDICATION: Signs/Symptoms:Stroke Evaluation with VAN Positive; Signs/Symptoms:Stroke Evaluation   Left hemiplegia   COMPARISON: None.   ACCESSION NUMBER(S): [id number]; [id number]; [id number]   ORDERING CLINICIAN: REBECCA MOON   TECHNIQUE: Multiple contiguous axial noncontrast images of the head were obtained. Following IV contrast administration of 75 cc Omnipaque 350, a CT angiography of the head and neck was performed. MIPS and 3D reconstructions of the [place] and neck were created on an independent workstation and reviewed.   FINDINGS: NON-CONTRAST HEAD CT:   BRAIN PARENCHYMA: There are confluent areas hypoattenuation within the right  cerebral hemisphere, nonspecific, but likely representing chronic microangiopathic changes in this age demographic.. Lacunar infarcts are noted in the basal ganglia bilaterally as well as the right centrum semiovale. No evidence of loss of gray-white matter differentiation. No evidence of acute intraparenchymal hemorrhage or parenchymal evidence of an acute large territory ischemic infarct. No mass-effect, midline shift or effacement of cerebral sulci.   VENTRICLES and EXTRA-AXIAL SPACES:  No acute extra-axial or intraventricular hemorrhage. Ventricles and sulci are age-concordant.Mild ventriculomegaly with disproportionate enlargement of the sylvian fissures and crowding of the sulci at the vertices, which may be seen in the setting of normal pressure hydrocephalus.   PARANASAL SINUSES/MASTOIDS:  No hemorrhage or air-fluid levels within the visualized paranasal sinuses. The mastoids are well aerated.   CALVARIUM/ORBITS:  No skull fracture.  The orbits and globes are intact to the extent visualized.   EXTRACRANIAL SOFT TISSUES: No discernible abnormality.       CTA NECK:   Two vessel aortic arch, with common origin of the left common carotid and the brachiocephalic trunk. The origins of the arch vessels are widely patent.   LEFT VERTEBRAL ARTERY:  No hemodynamically significant stenosis, occlusion, or dissection.   LEFT COMMON/INTERNAL CAROTID ARTERY: The common carotid artery is patent.  Atherosclerotic calcification noted involving the carotid bulb extending into the proximal internal carotid artery resulting in 0% stenosis by NASCET criteria. Otherwise, no hemodynamically significant stenosis, occlusion, or dissection.   RIGHT VERTEBRAL ARTERY:  No hemodynamically significant stenosis, occlusion, or dissection.   RIGHT COMMON/INTERNAL CAROTID ARTERY: The common carotid artery is patent.  Atherosclerotic calcification noted involving the carotid bulb extending into the proximal internal carotid artery resulting in  0% stenosis by NASCET criteria. Otherwise, no hemodynamically significant stenosis, occlusion, or dissection.     The neck soft tissues show no evidence of mass, fluid collection, or enlarged lymph nodes. There is no acute osseous abnormality. Moderate degenerative changes of the cervical spine noted. Incidental note of an azygous fissure in the right upper lobe. Prominent periapical lucency is noted involving the premolar and molar mandibular teeth on the right.       CTA HEAD:   ANTERIOR CIRCULATION: No aneurysm.   - Internal Carotid Arteries: Atherosclerotic calcification is noted involving the right lacerum, cavernous, and clinoid right ICA without hemodynamically significant stenosis. Atherosclerotic calcifications noted involving the cavernous and ophthalmic segments of the left ICA resulting in mild narrowing in the mid cavernous segment. Otherwise, no hemodynamically significant stenosis or occlusion.   - Middle Cerebral Arteries:  Mild narrowing of the left M1 segment (series 501, image 626). Possible minimal narrowing of the right M1 segment (series 501, image 639).   - Anterior Cerebral Arteries:  No hemodynamically significant stenosis or occlusion.     POSTERIOR CIRCULATION: No aneurysm.   - Intracranial Vertebral Arteries:  No hemodynamically significant stenosis or occlusion.   - Basilar Artery:  No hemodynamically significant stenosis or occlusion. Mild focal dolichoectasia of the mid basilar artery (series 501, image 606).   - Posterior Cerebral Arteries: Bilateral posterior communicating arteries are opacified. No hemodynamically significant stenosis or occlusion.           1. Multiple well-circumscribed hypodensities within the right right-greater-than-left basal ganglia and deep white matter, likely representing chronic lacunar infarcts. However, superimposed acute component can not be excluded. If there is high clinical suspicion, consider MRI for further evaluation. 2. Mild ventriculomegaly  with disproportionate enlargement of the sylvian fissures and crowding of the sulci at the vertices, which may be seen in the setting of normal pressure hydrocephalus. Correlation with clinical exam recommended. 3. Periventricular white matter confluent hypodensities are nonspecific, but likely representing chronic microangiopathic changes in this age demographic. 4. No evidence of source vessel arterial occlusion, flow significant stenosis, dissection, or aneurysm within the head and neck. Mild atherosclerotic changes of the intracranial vasculature as above. 5. Prominent periapical lucency is noted involving the premolar and molar mandibular teeth on the right, suggestive of periodontal disease. 6. Additional findings as above.   I personally reviewed the images/study and I agree with the findings as stated. This study was interpreted at [place], [place].   MACRO: None   Signed by: Bebeto Yadav 11/17/2023 2:34 PM Dictation workstation:   [id number]    CT angio brain attack head w IV contrast and post procedure    Result Date: 11/17/2023  Interpreted By:  Bebeto Yadav and Tavana Shahrzad STUDY: CT ANGIO BRAIN ATTACK NECK W IV CONTRAST AND POST PROCEDURE; CT ANGIO BRAIN ATTACK HEAD W IV CONTRAST AND POST PROCEDURE; CT BRAIN ATTACK HEAD WO IV CONTRAST;  11/17/2023 2:05 pm; 11/17/2023 2:03 pm   INDICATION: Signs/Symptoms:Stroke Evaluation with VAN Positive; Signs/Symptoms:Stroke Evaluation   Left hemiplegia   COMPARISON: None.   ACCESSION NUMBER(S): [id number]; [id number]; [id number]   ORDERING CLINICIAN: REBECCA MOON   TECHNIQUE: Multiple contiguous axial noncontrast images of the head were obtained. Following IV contrast administration of 75 cc Omnipaque 350, a CT angiography of the head and neck was performed. MIPS and 3D reconstructions of the [place] and neck were created on an independent workstation and reviewed.   FINDINGS: NON-CONTRAST HEAD  CT:   BRAIN PARENCHYMA: There are confluent areas hypoattenuation within the right cerebral hemisphere, nonspecific, but likely representing chronic microangiopathic changes in this age demographic.. Lacunar infarcts are noted in the basal ganglia bilaterally as well as the right centrum semiovale. No evidence of loss of gray-white matter differentiation. No evidence of acute intraparenchymal hemorrhage or parenchymal evidence of an acute large territory ischemic infarct. No mass-effect, midline shift or effacement of cerebral sulci.   VENTRICLES and EXTRA-AXIAL SPACES:  No acute extra-axial or intraventricular hemorrhage. Ventricles and sulci are age-concordant.Mild ventriculomegaly with disproportionate enlargement of the sylvian fissures and crowding of the sulci at the vertices, which may be seen in the setting of normal pressure hydrocephalus.   PARANASAL SINUSES/MASTOIDS:  No hemorrhage or air-fluid levels within the visualized paranasal sinuses. The mastoids are well aerated.   CALVARIUM/ORBITS:  No skull fracture.  The orbits and globes are intact to the extent visualized.   EXTRACRANIAL SOFT TISSUES: No discernible abnormality.       CTA NECK:   Two vessel aortic arch, with common origin of the left common carotid and the brachiocephalic trunk. The origins of the arch vessels are widely patent.   LEFT VERTEBRAL ARTERY:  No hemodynamically significant stenosis, occlusion, or dissection.   LEFT COMMON/INTERNAL CAROTID ARTERY: The common carotid artery is patent.  Atherosclerotic calcification noted involving the carotid bulb extending into the proximal internal carotid artery resulting in 0% stenosis by NASCET criteria. Otherwise, no hemodynamically significant stenosis, occlusion, or dissection.   RIGHT VERTEBRAL ARTERY:  No hemodynamically significant stenosis, occlusion, or dissection.   RIGHT COMMON/INTERNAL CAROTID ARTERY: The common carotid artery is patent.  Atherosclerotic calcification noted  involving the carotid bulb extending into the proximal internal carotid artery resulting in 0% stenosis by NASCET criteria. Otherwise, no hemodynamically significant stenosis, occlusion, or dissection.     The neck soft tissues show no evidence of mass, fluid collection, or enlarged lymph nodes. There is no acute osseous abnormality. Moderate degenerative changes of the cervical spine noted. Incidental note of an azygous fissure in the right upper lobe. Prominent periapical lucency is noted involving the premolar and molar mandibular teeth on the right.       CTA HEAD:   ANTERIOR CIRCULATION: No aneurysm.   - Internal Carotid Arteries: Atherosclerotic calcification is noted involving the right lacerum, cavernous, and clinoid right ICA without hemodynamically significant stenosis. Atherosclerotic calcifications noted involving the cavernous and ophthalmic segments of the left ICA resulting in mild narrowing in the mid cavernous segment. Otherwise, no hemodynamically significant stenosis or occlusion.   - Middle Cerebral Arteries:  Mild narrowing of the left M1 segment (series 501, image 626). Possible minimal narrowing of the right M1 segment (series 501, image 639).   - Anterior Cerebral Arteries:  No hemodynamically significant stenosis or occlusion.     POSTERIOR CIRCULATION: No aneurysm.   - Intracranial Vertebral Arteries:  No hemodynamically significant stenosis or occlusion.   - Basilar Artery:  No hemodynamically significant stenosis or occlusion. Mild focal dolichoectasia of the mid basilar artery (series 501, image 606).   - Posterior Cerebral Arteries: Bilateral posterior communicating arteries are opacified. No hemodynamically significant stenosis or occlusion.           1. Multiple well-circumscribed hypodensities within the right right-greater-than-left basal ganglia and deep white matter, likely representing chronic lacunar infarcts. However, superimposed acute component can not be excluded. If there  is high clinical suspicion, consider MRI for further evaluation. 2. Mild ventriculomegaly with disproportionate enlargement of the sylvian fissures and crowding of the sulci at the vertices, which may be seen in the setting of normal pressure hydrocephalus. Correlation with clinical exam recommended. 3. Periventricular white matter confluent hypodensities are nonspecific, but likely representing chronic microangiopathic changes in this age demographic. 4. No evidence of source vessel arterial occlusion, flow significant stenosis, dissection, or aneurysm within the head and neck. Mild atherosclerotic changes of the intracranial vasculature as above. 5. Prominent periapical lucency is noted involving the premolar and molar mandibular teeth on the right, suggestive of periodontal disease. 6. Additional findings as above.   I personally reviewed the images/study and I agree with the findings as stated. This study was interpreted at [place], [place].   MACRO: None   Signed by: Bebeto Yadav 11/17/2023 2:34 PM Dictation workstation:   [id number]    CT angio brain attack neck w IV contrast and post procedure    Result Date: 11/17/2023  Interpreted By:  Bebeto Yadav,  Jovanni Mercado STUDY: CT ANGIO BRAIN ATTACK NECK W IV CONTRAST AND POST PROCEDURE; CT ANGIO BRAIN ATTACK HEAD W IV CONTRAST AND POST PROCEDURE; CT BRAIN ATTACK HEAD WO IV CONTRAST;  11/17/2023 2:05 pm; 11/17/2023 2:03 pm   INDICATION: Signs/Symptoms:Stroke Evaluation with VAN Positive; Signs/Symptoms:Stroke Evaluation   Left hemiplegia   COMPARISON: None.   ACCESSION NUMBER(S): [id number]; [id number]; [id number]   ORDERING CLINICIAN: REBECCA MOON   TECHNIQUE: Multiple contiguous axial noncontrast images of the head were obtained. Following IV contrast administration of 75 cc Omnipaque 350, a CT angiography of the head and neck was performed. MIPS and 3D reconstructions of the [place] and neck  were created on an independent workstation and reviewed.   FINDINGS: NON-CONTRAST HEAD CT:   BRAIN PARENCHYMA: There are confluent areas hypoattenuation within the right cerebral hemisphere, nonspecific, but likely representing chronic microangiopathic changes in this age demographic.. Lacunar infarcts are noted in the basal ganglia bilaterally as well as the right centrum semiovale. No evidence of loss of gray-white matter differentiation. No evidence of acute intraparenchymal hemorrhage or parenchymal evidence of an acute large territory ischemic infarct. No mass-effect, midline shift or effacement of cerebral sulci.   VENTRICLES and EXTRA-AXIAL SPACES:  No acute extra-axial or intraventricular hemorrhage. Ventricles and sulci are age-concordant.Mild ventriculomegaly with disproportionate enlargement of the sylvian fissures and crowding of the sulci at the vertices, which may be seen in the setting of normal pressure hydrocephalus.   PARANASAL SINUSES/MASTOIDS:  No hemorrhage or air-fluid levels within the visualized paranasal sinuses. The mastoids are well aerated.   CALVARIUM/ORBITS:  No skull fracture.  The orbits and globes are intact to the extent visualized.   EXTRACRANIAL SOFT TISSUES: No discernible abnormality.       CTA NECK:   Two vessel aortic arch, with common origin of the left common carotid and the brachiocephalic trunk. The origins of the arch vessels are widely patent.   LEFT VERTEBRAL ARTERY:  No hemodynamically significant stenosis, occlusion, or dissection.   LEFT COMMON/INTERNAL CAROTID ARTERY: The common carotid artery is patent.  Atherosclerotic calcification noted involving the carotid bulb extending into the proximal internal carotid artery resulting in 0% stenosis by NASCET criteria. Otherwise, no hemodynamically significant stenosis, occlusion, or dissection.   RIGHT VERTEBRAL ARTERY:  No hemodynamically significant stenosis, occlusion, or dissection.   RIGHT COMMON/INTERNAL CAROTID  ARTERY: The common carotid artery is patent.  Atherosclerotic calcification noted involving the carotid bulb extending into the proximal internal carotid artery resulting in 0% stenosis by NASCET criteria. Otherwise, no hemodynamically significant stenosis, occlusion, or dissection.     The neck soft tissues show no evidence of mass, fluid collection, or enlarged lymph nodes. There is no acute osseous abnormality. Moderate degenerative changes of the cervical spine noted. Incidental note of an azygous fissure in the right upper lobe. Prominent periapical lucency is noted involving the premolar and molar mandibular teeth on the right.       CTA HEAD:   ANTERIOR CIRCULATION: No aneurysm.   - Internal Carotid Arteries: Atherosclerotic calcification is noted involving the right lacerum, cavernous, and clinoid right ICA without hemodynamically significant stenosis. Atherosclerotic calcifications noted involving the cavernous and ophthalmic segments of the left ICA resulting in mild narrowing in the mid cavernous segment. Otherwise, no hemodynamically significant stenosis or occlusion.   - Middle Cerebral Arteries:  Mild narrowing of the left M1 segment (series 501, image 626). Possible minimal narrowing of the right M1 segment (series 501, image 639).   - Anterior Cerebral Arteries:  No hemodynamically significant stenosis or occlusion.     POSTERIOR CIRCULATION: No aneurysm.   - Intracranial Vertebral Arteries:  No hemodynamically significant stenosis or occlusion.   - Basilar Artery:  No hemodynamically significant stenosis or occlusion. Mild focal dolichoectasia of the mid basilar artery (series 501, image 606).   - Posterior Cerebral Arteries: Bilateral posterior communicating arteries are opacified. No hemodynamically significant stenosis or occlusion.           1. Multiple well-circumscribed hypodensities within the right right-greater-than-left basal ganglia and deep white matter, likely representing chronic  lacunar infarcts. However, superimposed acute component can not be excluded. If there is high clinical suspicion, consider MRI for further evaluation. 2. Mild ventriculomegaly with disproportionate enlargement of the sylvian fissures and crowding of the sulci at the vertices, which may be seen in the setting of normal pressure hydrocephalus. Correlation with clinical exam recommended. 3. Periventricular white matter confluent hypodensities are nonspecific, but likely representing chronic microangiopathic changes in this age demographic. 4. No evidence of source vessel arterial occlusion, flow significant stenosis, dissection, or aneurysm within the head and neck. Mild atherosclerotic changes of the intracranial vasculature as above. 5. Prominent periapical lucency is noted involving the premolar and molar mandibular teeth on the right, suggestive of periodontal disease. 6. Additional findings as above.   I personally reviewed the images/study and I agree with the findings as stated. This study was interpreted at [place], [place].   MACRO: None   Signed by: Bebeto Yadav 11/17/2023 2:34 PM Dictation workstation:   [id number]      Assessment/Plan   62-year-old -American male, who is known to history of hypertension, history of cocaine use, hyperlipidemia, he came to the emergency department, he was sent to the emergency department as ambulance was called that he fell and he was unable to get up and he had dense left-sided weakness.  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  Hypertension blood pressure is acceptable.  PT OT consult.  He is refusing to go to rehab which I explained that he needs it he is a very high fall risk         I spent 35 minutes in the professional and overall care of  this patient.    Brandi Titus MD

## 2023-11-19 NOTE — CARE PLAN
Problem: Skin  Goal: Prevent/manage excess moisture  Outcome: Progressing  Flowsheets (Taken 11/18/2023 2058)  Prevent/manage excess moisture:   Moisturize dry skin   Follow provider orders for dressing changes     Problem: Fall/Injury  Goal: Not fall by end of shift  Outcome: Progressing  Goal: Be free from injury by end of the shift  Outcome: Progressing  Goal: Verbalize understanding of personal risk factors for fall in the hospital  Outcome: Progressing

## 2023-11-19 NOTE — NURSING NOTE
1240 9 beat run of VTACH noted on telemetry monitor. Patient assessed and asymptomatic. Dr Titus notified.

## 2023-11-20 ENCOUNTER — APPOINTMENT (OUTPATIENT)
Dept: CARDIOLOGY | Facility: HOSPITAL | Age: 68
End: 2023-11-20
Payer: MEDICAID

## 2023-11-20 LAB
ANION GAP SERPL CALC-SCNC: 12 MMOL/L (ref 10–20)
ATRIAL RATE: 67 BPM
BUN SERPL-MCNC: 24 MG/DL (ref 6–23)
CALCIUM SERPL-MCNC: 8.3 MG/DL (ref 8.6–10.3)
CHLORIDE SERPL-SCNC: 108 MMOL/L (ref 98–107)
CO2 SERPL-SCNC: 21 MMOL/L (ref 21–32)
CREAT SERPL-MCNC: 1.23 MG/DL (ref 0.5–1.3)
ERYTHROCYTE [DISTWIDTH] IN BLOOD BY AUTOMATED COUNT: 14.1 % (ref 11.5–14.5)
GFR SERPL CREATININE-BSD FRML MDRD: 65 ML/MIN/1.73M*2
GLUCOSE BLD MANUAL STRIP-MCNC: 109 MG/DL (ref 74–99)
GLUCOSE BLD MANUAL STRIP-MCNC: 111 MG/DL (ref 74–99)
GLUCOSE BLD MANUAL STRIP-MCNC: 124 MG/DL (ref 74–99)
GLUCOSE BLD MANUAL STRIP-MCNC: 58 MG/DL (ref 74–99)
GLUCOSE BLD MANUAL STRIP-MCNC: 97 MG/DL (ref 74–99)
GLUCOSE SERPL-MCNC: 98 MG/DL (ref 74–99)
HCT VFR BLD AUTO: 45.9 % (ref 41–52)
HGB BLD-MCNC: 14.8 G/DL (ref 13.5–17.5)
MCH RBC QN AUTO: 27 PG (ref 26–34)
MCHC RBC AUTO-ENTMCNC: 32.2 G/DL (ref 32–36)
MCV RBC AUTO: 84 FL (ref 80–100)
NRBC BLD-RTO: 0 /100 WBCS (ref 0–0)
P AXIS: 23 DEGREES
P OFFSET: 181 MS
P ONSET: 120 MS
PLATELET # BLD AUTO: 245 X10*3/UL (ref 150–450)
POTASSIUM SERPL-SCNC: 3.8 MMOL/L (ref 3.5–5.3)
PR INTERVAL: 190 MS
Q ONSET: 215 MS
QRS COUNT: 11 BEATS
QRS DURATION: 112 MS
QT INTERVAL: 390 MS
QTC CALCULATION(BAZETT): 412 MS
QTC FREDERICIA: 404 MS
R AXIS: -15 DEGREES
RBC # BLD AUTO: 5.49 X10*6/UL (ref 4.5–5.9)
SODIUM SERPL-SCNC: 137 MMOL/L (ref 136–145)
T AXIS: 179 DEGREES
T OFFSET: 410 MS
VENTRICULAR RATE: 67 BPM
WBC # BLD AUTO: 7.7 X10*3/UL (ref 4.4–11.3)

## 2023-11-20 PROCEDURE — 93005 ELECTROCARDIOGRAM TRACING: CPT

## 2023-11-20 PROCEDURE — 2500000001 HC RX 250 WO HCPCS SELF ADMINISTERED DRUGS (ALT 637 FOR MEDICARE OP): Performed by: INTERNAL MEDICINE

## 2023-11-20 PROCEDURE — 2060000001 HC INTERMEDIATE ICU ROOM DAILY

## 2023-11-20 PROCEDURE — 93010 ELECTROCARDIOGRAM REPORT: CPT | Performed by: INTERNAL MEDICINE

## 2023-11-20 PROCEDURE — 2500000001 HC RX 250 WO HCPCS SELF ADMINISTERED DRUGS (ALT 637 FOR MEDICARE OP): Performed by: NURSE PRACTITIONER

## 2023-11-20 PROCEDURE — 80048 BASIC METABOLIC PNL TOTAL CA: CPT | Performed by: INTERNAL MEDICINE

## 2023-11-20 PROCEDURE — 97530 THERAPEUTIC ACTIVITIES: CPT | Mod: GP

## 2023-11-20 PROCEDURE — 99223 1ST HOSP IP/OBS HIGH 75: CPT | Performed by: STUDENT IN AN ORGANIZED HEALTH CARE EDUCATION/TRAINING PROGRAM

## 2023-11-20 PROCEDURE — 2500000004 HC RX 250 GENERAL PHARMACY W/ HCPCS (ALT 636 FOR OP/ED): Performed by: INTERNAL MEDICINE

## 2023-11-20 PROCEDURE — 92610 EVALUATE SWALLOWING FUNCTION: CPT | Mod: GN

## 2023-11-20 PROCEDURE — 82947 ASSAY GLUCOSE BLOOD QUANT: CPT

## 2023-11-20 PROCEDURE — 94760 N-INVAS EAR/PLS OXIMETRY 1: CPT

## 2023-11-20 PROCEDURE — 97166 OT EVAL MOD COMPLEX 45 MIN: CPT | Mod: GO

## 2023-11-20 PROCEDURE — 96372 THER/PROPH/DIAG INJ SC/IM: CPT | Performed by: INTERNAL MEDICINE

## 2023-11-20 PROCEDURE — 85027 COMPLETE CBC AUTOMATED: CPT | Performed by: INTERNAL MEDICINE

## 2023-11-20 PROCEDURE — 36415 COLL VENOUS BLD VENIPUNCTURE: CPT | Performed by: INTERNAL MEDICINE

## 2023-11-20 RX ORDER — ISOSORBIDE MONONITRATE 30 MG/1
30 TABLET, EXTENDED RELEASE ORAL DAILY
Status: DISCONTINUED | OUTPATIENT
Start: 2023-11-20 | End: 2023-11-22 | Stop reason: HOSPADM

## 2023-11-20 RX ADMIN — SACUBITRIL AND VALSARTAN 1 TABLET: 24; 26 TABLET, FILM COATED ORAL at 20:42

## 2023-11-20 RX ADMIN — SENNOSIDES 17.2 MG: 8.6 TABLET, FILM COATED ORAL at 09:02

## 2023-11-20 RX ADMIN — Medication 3 MG: at 19:53

## 2023-11-20 RX ADMIN — ASPIRIN 81 MG: 81 TABLET, COATED ORAL at 09:02

## 2023-11-20 RX ADMIN — CLOPIDOGREL 75 MG: 75 TABLET ORAL at 09:02

## 2023-11-20 RX ADMIN — SENNOSIDES 17.2 MG: 8.6 TABLET, FILM COATED ORAL at 20:42

## 2023-11-20 RX ADMIN — ATORVASTATIN CALCIUM 80 MG: 80 TABLET, FILM COATED ORAL at 20:42

## 2023-11-20 RX ADMIN — ISOSORBIDE MONONITRATE 30 MG: 30 TABLET, EXTENDED RELEASE ORAL at 15:28

## 2023-11-20 RX ADMIN — ENOXAPARIN SODIUM 40 MG: 40 INJECTION SUBCUTANEOUS at 09:02

## 2023-11-20 ASSESSMENT — PAIN - FUNCTIONAL ASSESSMENT
PAIN_FUNCTIONAL_ASSESSMENT: 0-10

## 2023-11-20 ASSESSMENT — COGNITIVE AND FUNCTIONAL STATUS - GENERAL
DAILY ACTIVITIY SCORE: 11
TOILETING: TOTAL
HELP NEEDED FOR BATHING: A LOT
TURNING FROM BACK TO SIDE WHILE IN FLAT BAD: A LOT
STANDING UP FROM CHAIR USING ARMS: A LOT
CLIMB 3 TO 5 STEPS WITH RAILING: TOTAL
MOVING TO AND FROM BED TO CHAIR: A LOT
MOBILITY SCORE: 10
DRESSING REGULAR UPPER BODY CLOTHING: A LOT
MOVING FROM LYING ON BACK TO SITTING ON SIDE OF FLAT BED WITH BEDRAILS: A LOT
MOBILITY SCORE: 10
PERSONAL GROOMING: A LOT
DRESSING REGULAR LOWER BODY CLOTHING: TOTAL
WALKING IN HOSPITAL ROOM: TOTAL
TOILETING: TOTAL
MOVING TO AND FROM BED TO CHAIR: A LOT
HELP NEEDED FOR BATHING: A LOT
TURNING FROM BACK TO SIDE WHILE IN FLAT BAD: A LOT
WALKING IN HOSPITAL ROOM: TOTAL
DRESSING REGULAR UPPER BODY CLOTHING: A LOT
EATING MEALS: A LITTLE
MOVING FROM LYING ON BACK TO SITTING ON SIDE OF FLAT BED WITH BEDRAILS: A LOT
EATING MEALS: A LITTLE
CLIMB 3 TO 5 STEPS WITH RAILING: TOTAL
PERSONAL GROOMING: A LOT
STANDING UP FROM CHAIR USING ARMS: A LOT
DAILY ACTIVITIY SCORE: 11
DRESSING REGULAR LOWER BODY CLOTHING: TOTAL

## 2023-11-20 ASSESSMENT — PAIN SCALES - GENERAL
PAINLEVEL_OUTOF10: 0 - NO PAIN

## 2023-11-20 ASSESSMENT — ACTIVITIES OF DAILY LIVING (ADL)
LACK_OF_TRANSPORTATION: NO
ADL_ASSISTANCE: INDEPENDENT

## 2023-11-20 NOTE — PROGRESS NOTES
Physical Therapy    Physical Therapy Treatment    Patient Name: Román Marinelli  MRN: [id number]  Today's Date: 11/20/2023  Time Calculation  Start Time: 1337  Stop Time: 1354  Time Calculation (min): 17 min       Assessment/Plan   PT Assessment  PT Assessment Results: Decreased strength, Decreased endurance, Impaired balance, Decreased mobility, Decreased safety awareness, Impaired judgement  Rehab Prognosis: Good  Evaluation/Treatment Tolerance: Patient limited by fatigue (dizziness)  Medical Staff Made Aware: Yes  Strengths: Premorbid level of function  End of Session Communication: Bedside nurse  Assessment Comment: Pt presents today with improved BLE strength, and improved activity tolerance being able to stand this date. Pt is still below the reported PLOF requiring mod-max assist with mobility, and is at an increased risk of falls. Continued PT during the current admission would benefit the pt to continue to progress mobility closer to PLOF and reduce fall risk.  End of Session Patient Position: Bed, 3 rail up, Alarm on  PT Plan  Inpatient/Swing Bed or Outpatient: Inpatient  PT Plan  Treatment/Interventions: Bed mobility, Transfer training, Gait training, Stair training, Balance training, Neuromuscular re-education, Strengthening, Endurance training, Therapeutic exercise, Therapeutic activity, Home exercise program, Positioning  PT Plan: Skilled PT  PT Frequency: 4 times per week  PT Discharge Recommendations: High intensity level of continued care  Equipment Recommended upon Discharge:  (BD in future sessions)  PT Recommended Transfer Status: Assist x2      General Visit Information:   PT  Visit  PT Received On: 11/20/23  Response to Previous Treatment: Patient with no complaints from previous session.  General  Reason for Referral: Stroke  Referred By: BRANDON Escobar  Co-Treatment: OT  Co-Treatment Reason: Co-treatment with OT to maximize pt safety, transfer ability, and participation  Prior to Session  Communication: Bedside nurse  Patient Position Received: Bed, 3 rail up, Alarm on  Preferred Learning Style: verbal, visual  General Comment: Pt supine in bed upon PT arrival. Cleared to participate with RN, and agreeable to PT treatment    Subjective   Precautions:  Precautions  Medical Precautions: Fall precautions  Vital Signs:  Vital Signs  Heart Rate: 74  Heart Rate Source: Monitor  SpO2: 98 %  Patient Position: Lying    Objective   Pain:  Pain Assessment  Pain Assessment: 0-10  Pain Score:  (Initially at start of session. Pt c/o intermittent left chest pain toward end of session. RN notified)  Pain Location: Chest  Pain Orientation: Left  Pain Frequency: Intermittent  Cognition:  Cognition  Orientation Level: Oriented X4  Impulsive: Mildly  Postural Control:  Postural Control  Trunk Control: Intermittent mod assist to trunk to prevent LOB to the left. Pt LUE placed on bed for support.  Extremity/Trunk Assessments:  RLE   RLE : Exceptions to WFL  Strength RLE  R Hip Flexion: 4-/5  R Knee Extension: 4/5  R Ankle Dorsiflexion: 4-/5  R Ankle Plantar Flexion: 3+/5  LLE   LLE : Exceptions to WFL  Strength LLE  L Hip Flexion: 2-/5  L Knee Extension: 3-/5  L Ankle Dorsiflexion: 2+/5  L Ankle Plantar Flexion: 2+/5  Activity Tolerance:  Activity Tolerance  Endurance: Tolerates 10 - 20 min exercise with multiple rests, Decreased tolerance for upright activites  Treatments:  Bed Mobility  Bed Mobility: Yes  Bed Mobility 1  Bed Mobility 1: Supine to sitting  Level of Assistance 1: Maximum assistance, Minimal verbal cues, Minimal tactile cues  Bed Mobility Comments 1: Pt able to progress RLE toward EOB with TC. Assist with LLE to EOB, LUE reach for bed rail, and with trunk into upright sitting. HOB elevated  Bed Mobility 2  Bed Mobility  2: Sitting to supine  Level of Assistance 2: Maximum assistance  Bed Mobility Comments 2: Assist with BLE lift into bed and trunk  lowering to bed  Bed Mobility 3  Bed Mobility 3:  Scooting  Level of Assistance 3: Moderate assistance  Bed Mobility Comments 3: About 3-4 scoots right. Assist with weight shifting and upper body when scooting right.    Ambulation/Gait Training  Ambulation/Gait Training Performed: No  Transfers  Transfer: Yes  Transfer 1  Transfer From 1: Sit to  Transfer to 1: Stand  Technique 1: Sit to stand  Transfer Device 1: Gait belt  Transfer Level of Assistance 1: Arm in arm assistance, Moderate assistance, +2, Minimal verbal cues, Minimal tactile cues  Trials/Comments 1: x2 trials. Intial forward flexion at trunk in standing. Pt able to achieve more upright posture with VC/TC. Dizziness reported in standing.  Transfers 2  Transfer From 2: Stand to  Transfer to 2: Sit  Technique 2: Stand to sit  Transfer Level of Assistance 2: Arm in arm assistance, Moderate assistance, +2, Minimal verbal cues  Trials/Comments 2: x2 trials. VC for UE reach without return demonstration. Assist to control descent to sitting surface    Stairs  Stairs: No    Outcome Measures:  [facility] Basic Mobility  Turning from your back to your side while in a flat bed without using bedrails: A lot  Moving from lying on your back to sitting on the side of a flat bed without using bedrails: A lot  Moving to and from bed to chair (including a wheelchair): A lot  Standing up from a chair using your arms (e.g. wheelchair or bedside chair): A lot  To walk in hospital room: Total  Climbing 3-5 steps with railing: Total  Basic Mobility - Total Score: 10    Education Documentation  Body Mechanics, taught by Darwin Jeter PT at 11/20/2023  2:19 PM.  Learner: Patient  Readiness: Acceptance  Method: Explanation, Demonstration  Response: Verbalizes Understanding    Mobility Training, taught by Darwin Jeter PT at 11/20/2023  2:19 PM.  Learner: Patient  Readiness: Acceptance  Method: Explanation, Demonstration  Response: Verbalizes Understanding    Education Comments  No comments found.        OP EDUCATION:        Encounter Problems       Encounter Problems (Active)       Balance       Goal 1 (Progressing)       Start:  11/18/23    Expected End:  12/02/23       Pt performs all sitting balance with mod assist x 1 and standing balance with mod assist x 2 with LRAD            Mobility       STG - Patient will ambulate (Not Progressing)       Start:  11/18/23    Expected End:  12/02/23       >15 ft with mod assist x 2 and LRAD            PT Problem       PT Goal 1 (Progressing)       Start:  11/18/23    Expected End:  12/02/23       Pt demonstrates improvement of at least 1 MMT grade in all BLE MMT tested during initial PT evaluation            Pain - Adult          Transfers       STG - Patient to transfer to and from sit to supine (Progressing)       Start:  11/18/23    Expected End:  12/02/23       With mod assist x 1         STG - Patient will transfer sit to and from stand (Progressing)       Start:  11/18/23    Expected End:  12/02/23       With mod assist x 2 and LRAD

## 2023-11-20 NOTE — PROGRESS NOTES
Speech-Language Pathology      Inpatient Speech-Language Pathology Clinical Swallow Evaluation    Patient Name: Román Marinelli  MRN: [id number]  : 1955  Today's Date: 23   Time Calculation  Start Time: 1010  Stop Time: 1036  Time Calculation (min): 26 min        RECOMMENDATIONS:  Risk for Aspiration: No  Additional Recommendations: Speech Language Cognition Evaluation, Dysphagia treatment  Solid Diet Recommendations : Regular (IDDSI Level 7)  Liquid Diet Recommendations: Other (Comments), Thin (IDDSI Level 0)  Compensatory Swallowing Strategies: Upright 90 degrees as possible for all oral intake, Small bites/sips, Eat/feed slowly, Check for pocketing of food  Medication Administration Recommendations: Whole, With Liquid    Assessment:  Assessment Results: Patient presents with mild oral dysphagia characterized by prolonged mastication of solids. Thin liquids consumed with timely bolus manipulation with good oral containment of boluses. Patient completed 3 oz continuously via cup with no overt s/s aspiration. Voice and respirations clear post swallows. Oral residue with solids noted on L with anterior bolus loss from oral cavity. Patient able to clear residue with liquid wash. Higher level cognitive deficits noted requiring further assessment.    Baseline Assessment:  Respiratory Status: Room air  History of Intubation: No        Behavior/Cognition: Alert, Cooperative, Pleasant mood  Patient Positioning: Partially/Semi Reclined  Baseline Vocal Quality: Normal    Oral-Motor Assessment:  Dentition: Adequate/Natural  Oral Motor:  (L sided labial/lingual weakness with reduced ROM.)    Plan:  SLP Plan: Skilled SLP  SLP Frequency: 3x per week  Duration: 2 weeks  SLP Discharge Recommendations: Continue skilled SLP services at the next level of care  Discussed POC: Patient  Discussed Risks/Benefits: Yes  Patient/Caregiver Agreeable: Yes    Goals:   1.   Patient will demonstrate safe/adequate management of  prescribed diet without s/s aspiration.    General Visit Information:  Patient admitted: 11/17/23  Past Medical History: HTN, cocaine use, HLD. Admitted following fall with L sided weakness. Imaging revealed a R corona radiata stroke extending to lentiform nucleus.     Living Environment: Home  Reason for Referral: s/p CVA  Ordering Physician: Dr. Titus  Current Diet : Regular/Thin ordered    Pain:  Pain Assessment: 0-10  Pain Score: 0 - No pain    Treatment:    N/A

## 2023-11-20 NOTE — CARE PLAN
Problem: General Stroke  Goal: Demonstrate improvement in neurological exam throughout the shift  Outcome: Progressing  Goal: Maintain BP within ordered limits throughout shift  Outcome: Progressing  Goal: No symptoms of aspiration throughout shift  Outcome: Progressing     Problem: Skin  Goal: Prevent/manage excess moisture  Outcome: Progressing  Goal: Prevent/minimize sheer/friction injuries  Outcome: Progressing  Goal: Promote skin healing  Outcome: Progressing     Problem: Fall/Injury  Goal: Not fall by end of shift  Outcome: Progressing  Goal: Be free from injury by end of the shift  Outcome: Progressing  Goal: Verbalize understanding of personal risk factors for fall in the hospital  Outcome: Progressing   The patient's goals for the shift include      The clinical goals for the shift include pt. will have no decrease in neurological function

## 2023-11-20 NOTE — CARE PLAN
Problem: General Stroke  Goal: Demonstrate improvement in neurological exam throughout the shift  Outcome: Progressing  Goal: Maintain BP within ordered limits throughout shift  Outcome: Progressing  Goal: No symptoms of aspiration throughout shift  Outcome: Progressing     Problem: Skin  Goal: Prevent/manage excess moisture  Outcome: Progressing  Goal: Prevent/minimize sheer/friction injuries  Outcome: Progressing  Goal: Promote skin healing  Outcome: Progressing     Problem: Fall/Injury  Goal: Not fall by end of shift  Outcome: Progressing  Goal: Be free from injury by end of the shift  Outcome: Progressing  Goal: Verbalize understanding of personal risk factors for fall in the hospital  Outcome: Progressing     Problem: Pain - Adult  Goal: Verbalizes/displays adequate comfort level or baseline comfort level  Outcome: Progressing     Problem: Safety - Adult  Goal: Free from fall injury  Outcome: Progressing     Problem: Discharge Planning  Goal: Discharge to home or other facility with appropriate resources  Outcome: Progressing     Problem: Chronic Conditions and Co-morbidities  Goal: Patient's chronic conditions and co-morbidity symptoms are monitored and maintained or improved  Outcome: Progressing   The patient's goals for the shift include      The clinical goals for the shift include pt. will have no decrease in neurological function    Over the shift, the patient did not make progress toward the following goals. Barriers to progression include . Recommendations to address these barriers include

## 2023-11-20 NOTE — PROGRESS NOTES
"Román Marinelli is a 68 y.o. male on day 3 of admission presenting with Acute arterial ischemic stroke, multifocal, mult vascular territories (CMS/HCC).    Subjective       He feels better,  He understands he needs to go to rehab as he is not able to walk or transfer by himself.      Objective     Physical Exam  Alert oriented and 3.  HEENT unremarkable.  Facial droop.  Heart S1-S2.  Lungs clear.  Left-sided weakness.  Last Recorded Vitals  Blood pressure 104/64, pulse 70, temperature 36.6 °C (97.8 °F), temperature source Temporal, resp. rate 18, height 1.753 m (5' 9\"), weight 99.2 kg (218 lb 11.1 oz), SpO2 97 %.  Intake/Output last 3 Shifts:  I/O last 3 completed shifts:  In: 120 (1.2 mL/kg) [P.O.:120]  Out: 1850 (18.6 mL/kg) [Urine:1850 (0.5 mL/kg/hr)]  Weight: 99.2 kg     Relevant Results  Results for orders placed or performed during the hospital encounter of 11/17/23 (from the past 96 hour(s))   CBC and Auto Differential   Result Value Ref Range    WBC 8.2 4.4 - 11.3 x10*3/uL    nRBC 0.0 0.0 - 0.0 /100 WBCs    RBC 5.34 4.50 - 5.90 x10*6/uL    Hemoglobin 14.4 13.5 - 17.5 g/dL    Hematocrit 44.5 41.0 - 52.0 %    MCV 83 80 - 100 fL    MCH 27.0 26.0 - 34.0 pg    MCHC 32.4 32.0 - 36.0 g/dL    RDW 13.9 11.5 - 14.5 %    Platelets 240 150 - 450 x10*3/uL    Neutrophils % 62.2 40.0 - 80.0 %    Immature Granulocytes %, Automated 0.2 0.0 - 0.9 %    Lymphocytes % 28.3 13.0 - 44.0 %    Monocytes % 8.1 2.0 - 10.0 %    Eosinophils % 0.6 0.0 - 6.0 %    Basophils % 0.6 0.0 - 2.0 %    Neutrophils Absolute 5.11 1.20 - 7.70 x10*3/uL    Immature Granulocytes Absolute, Automated 0.02 0.00 - 0.70 x10*3/uL    Lymphocytes Absolute 2.33 1.20 - 4.80 x10*3/uL    Monocytes Absolute 0.67 0.10 - 1.00 x10*3/uL    Eosinophils Absolute 0.05 0.00 - 0.70 x10*3/uL    Basophils Absolute 0.05 0.00 - 0.10 x10*3/uL   Comprehensive metabolic panel   Result Value Ref Range    Glucose 66 (L) 74 - 99 mg/dL    Sodium 136 136 - 145 mmol/L    Potassium 2.1 " (LL) 3.5 - 5.3 mmol/L    Chloride 115 (H) 98 - 107 mmol/L    Bicarbonate 16 (L) 21 - 32 mmol/L    Anion Gap 7 (L) 10 - 20 mmol/L    Urea Nitrogen 7 6 - 23 mg/dL    Creatinine 0.44 (L) 0.50 - 1.30 mg/dL    eGFR >90 >60 mL/min/1.73m*2    Calcium 4.3 (LL) 8.6 - 10.3 mg/dL    Albumin 2.0 (L) 3.4 - 5.0 g/dL    Alkaline Phosphatase 35 33 - 136 U/L    Total Protein 3.2 (L) 6.4 - 8.2 g/dL    AST 8 (L) 9 - 39 U/L    Bilirubin, Total 0.5 0.0 - 1.2 mg/dL    ALT 7 (L) 10 - 52 U/L   Troponin I, High Sensitivity   Result Value Ref Range    Troponin I, High Sensitivity 19 0 - 20 ng/L   Protime-INR   Result Value Ref Range    Protime 14.7 (H) 9.8 - 12.8 seconds    INR 1.3 (H) 0.9 - 1.1   APTT   Result Value Ref Range    aPTT 35 27 - 38 seconds   POCT GLUCOSE   Result Value Ref Range    POCT Glucose 102 (H) 74 - 99 mg/dL   Lipid Panel   Result Value Ref Range    Cholesterol 220 (H) 0 - 199 mg/dL    HDL-Cholesterol 53.9 mg/dL    Cholesterol/HDL Ratio 4.1     LDL Calculated 138 (H) <=99 mg/dL    VLDL 28 0 - 40 mg/dL    Triglycerides 142 0 - 149 mg/dL    Non HDL Cholesterol 166 (H) 0 - 149 mg/dL   Hemoglobin A1C   Result Value Ref Range    Hemoglobin A1C 5.9 (H) see below %    Estimated Average Glucose 123 Not Established mg/dL   B-Type Natriuretic Peptide   Result Value Ref Range     (H) 0 - 99 pg/mL   Creatine Kinase   Result Value Ref Range    Creatine Kinase 214 0 - 325 U/L   POCT GLUCOSE   Result Value Ref Range    POCT Glucose 119 (H) 74 - 99 mg/dL   Urinalysis with Reflex Microscopic   Result Value Ref Range    Color, Urine Yellow Straw, Yellow    Appearance, Urine Clear Clear    Specific Gravity, Urine 1.032 1.005 - 1.035    pH, Urine 6.0 5.0, 5.5, 6.0, 6.5, 7.0, 7.5, 8.0    Protein, Urine NEGATIVE NEGATIVE mg/dL    Glucose, Urine NEGATIVE NEGATIVE mg/dL    Blood, Urine NEGATIVE NEGATIVE    Ketones, Urine 5 (TRACE) (A) NEGATIVE mg/dL    Bilirubin, Urine NEGATIVE NEGATIVE    Urobilinogen, Urine <2.0 <2.0 mg/dL    Nitrite,  Urine NEGATIVE NEGATIVE    Leukocyte Esterase, Urine TRACE (A) NEGATIVE   Microscopic Only, Urine   Result Value Ref Range    WBC, Urine 6-10 (A) 1-5, NONE /HPF    RBC, Urine NONE NONE, 1-2, 3-5 /HPF   Drug Screen, Urine   Result Value Ref Range    Amphetamine Screen, Urine Presumptive Negative Presumptive Negative    Barbiturate Screen, Urine Presumptive Negative Presumptive Negative    Benzodiazepines Screen, Urine Presumptive Negative Presumptive Negative    Cannabinoid Screen, Urine Presumptive Negative Presumptive Negative    Cocaine Metabolite Screen, Urine Presumptive Negative Presumptive Negative    Fentanyl Screen, Urine Presumptive Negative Presumptive Negative    Opiate Screen, Urine Presumptive Negative Presumptive Negative    Oxycodone Screen, Urine Presumptive Negative Presumptive Negative    PCP Screen, Urine Presumptive Negative Presumptive Negative   CBC and Auto Differential   Result Value Ref Range    WBC 8.9 4.4 - 11.3 x10*3/uL    nRBC 0.0 0.0 - 0.0 /100 WBCs    RBC 5.75 4.50 - 5.90 x10*6/uL    Hemoglobin 15.8 13.5 - 17.5 g/dL    Hematocrit 48.1 41.0 - 52.0 %    MCV 84 80 - 100 fL    MCH 27.5 26.0 - 34.0 pg    MCHC 32.8 32.0 - 36.0 g/dL    RDW 14.1 11.5 - 14.5 %    Platelets 254 150 - 450 x10*3/uL    Neutrophils % 51.8 40.0 - 80.0 %    Immature Granulocytes %, Automated 0.3 0.0 - 0.9 %    Lymphocytes % 36.7 13.0 - 44.0 %    Monocytes % 8.8 2.0 - 10.0 %    Eosinophils % 1.6 0.0 - 6.0 %    Basophils % 0.8 0.0 - 2.0 %    Neutrophils Absolute 4.62 1.20 - 7.70 x10*3/uL    Immature Granulocytes Absolute, Automated 0.03 0.00 - 0.70 x10*3/uL    Lymphocytes Absolute 3.27 1.20 - 4.80 x10*3/uL    Monocytes Absolute 0.78 0.10 - 1.00 x10*3/uL    Eosinophils Absolute 0.14 0.00 - 0.70 x10*3/uL    Basophils Absolute 0.07 0.00 - 0.10 x10*3/uL   Basic Metabolic Panel   Result Value Ref Range    Glucose 108 (H) 74 - 99 mg/dL    Sodium 138 136 - 145 mmol/L    Potassium 3.9 3.5 - 5.3 mmol/L    Chloride 107 98 - 107  mmol/L    Bicarbonate 21 21 - 32 mmol/L    Anion Gap 14 10 - 20 mmol/L    Urea Nitrogen 17 6 - 23 mg/dL    Creatinine 1.33 (H) 0.50 - 1.30 mg/dL    eGFR 59 (L) >60 mL/min/1.73m*2    Calcium 8.7 8.6 - 10.3 mg/dL   SST TOP   Result Value Ref Range    Extra Tube Hold for add-ons.    POCT GLUCOSE   Result Value Ref Range    POCT Glucose 128 (H) 74 - 99 mg/dL   Transthoracic Echo (TTE) Complete   Result Value Ref Range    AV pk mathew 1.10     AV mn grad 3.0     LVOT diam 2.00     LV biplane EF 41     MV avg E/e' ratio 10.03     MV E/A ratio 0.53     LA vol index A/L 26.9     Tricuspid annular plane systolic excursion 2.1     RV free wall pk S' 13.00     LVIDd 5.40     Aortic Valve Area by Continuity of VTI 1.81     Aortic Valve Area by Continuity of Peak Velocity 1.98     AV pk grad 4.8     LV A4C EF 39.2    POCT GLUCOSE   Result Value Ref Range    POCT Glucose 129 (H) 74 - 99 mg/dL   POCT GLUCOSE   Result Value Ref Range    POCT Glucose 127 (H) 74 - 99 mg/dL   POCT GLUCOSE   Result Value Ref Range    POCT Glucose 159 (H) 74 - 99 mg/dL   Magnesium   Result Value Ref Range    Magnesium 2.00 1.60 - 2.40 mg/dL   POCT GLUCOSE   Result Value Ref Range    POCT Glucose 105 (H) 74 - 99 mg/dL   POCT GLUCOSE   Result Value Ref Range    POCT Glucose 101 (H) 74 - 99 mg/dL   CBC   Result Value Ref Range    WBC 7.2 4.4 - 11.3 x10*3/uL    nRBC 0.0 0.0 - 0.0 /100 WBCs    RBC 5.42 4.50 - 5.90 x10*6/uL    Hemoglobin 14.9 13.5 - 17.5 g/dL    Hematocrit 46.3 41.0 - 52.0 %    MCV 85 80 - 100 fL    MCH 27.5 26.0 - 34.0 pg    MCHC 32.2 32.0 - 36.0 g/dL    RDW 14.4 11.5 - 14.5 %    Platelets 254 150 - 450 x10*3/uL   Basic Metabolic Panel   Result Value Ref Range    Glucose 105 (H) 74 - 99 mg/dL    Sodium 137 136 - 145 mmol/L    Potassium 4.0 3.5 - 5.3 mmol/L    Chloride 108 (H) 98 - 107 mmol/L    Bicarbonate 20 (L) 21 - 32 mmol/L    Anion Gap 13 10 - 20 mmol/L    Urea Nitrogen 28 (H) 6 - 23 mg/dL    Creatinine 1.28 0.50 - 1.30 mg/dL    eGFR 62  >60 mL/min/1.73m*2    Calcium 8.0 (L) 8.6 - 10.3 mg/dL   POCT GLUCOSE   Result Value Ref Range    POCT Glucose 110 (H) 74 - 99 mg/dL   CBC   Result Value Ref Range    WBC 7.7 4.4 - 11.3 x10*3/uL    nRBC 0.0 0.0 - 0.0 /100 WBCs    RBC 5.49 4.50 - 5.90 x10*6/uL    Hemoglobin 14.8 13.5 - 17.5 g/dL    Hematocrit 45.9 41.0 - 52.0 %    MCV 84 80 - 100 fL    MCH 27.0 26.0 - 34.0 pg    MCHC 32.2 32.0 - 36.0 g/dL    RDW 14.1 11.5 - 14.5 %    Platelets 245 150 - 450 x10*3/uL   Basic Metabolic Panel   Result Value Ref Range    Glucose 98 74 - 99 mg/dL    Sodium 137 136 - 145 mmol/L    Potassium 3.8 3.5 - 5.3 mmol/L    Chloride 108 (H) 98 - 107 mmol/L    Bicarbonate 21 21 - 32 mmol/L    Anion Gap 12 10 - 20 mmol/L    Urea Nitrogen 24 (H) 6 - 23 mg/dL    Creatinine 1.23 0.50 - 1.30 mg/dL    eGFR 65 >60 mL/min/1.73m*2    Calcium 8.3 (L) 8.6 - 10.3 mg/dL   POCT GLUCOSE   Result Value Ref Range    POCT Glucose 111 (H) 74 - 99 mg/dL        Medications:  aspirin, 81 mg, oral, Daily  atorvastatin, 80 mg, oral, Nightly  clopidogrel, 75 mg, oral, Daily  enoxaparin, 40 mg, subcutaneous, Daily  melatonin, 3 mg, oral, Daily  perflutren lipid microspheres, 0.5-10 mL of dilution, intravenous, Once in imaging  perflutren protein A microsphere, 0.5 mL, intravenous, Once in imaging  sennosides, 2 tablet, oral, BID  sulfur hexafluoride microsphr, 2 mL, intravenous, Once in imaging      PRN medications: acetaminophen **OR** acetaminophen **OR** acetaminophen, acetaminophen **OR** acetaminophen **OR** acetaminophen, hydrALAZINE, magnesium hydroxide, ondansetron **OR** ondansetron, oxygen    Transthoracic Echo (TTE) Complete    Result Date: 11/19/2023   [facility], [address] [place] [address]              Tel [phone] and Fax [phone] TRANSTHORACIC ECHOCARDIOGRAM REPORT  Patient Name:      PRABHAKAR Nath Physician:    [id number] Bates                                                                Cristobal NICHOLAS Study Date:        11/18/2023           Ordering Provider:    [id number]                                                               ROSEANNEKEY CALDERON MRN/PID:           [id number]             Fellow: Accession#:        [id number]         Nurse: Date of Birth/Age: 1955 / 68 years Sonographer:          Shelby Rowland RDCS Gender:            M                    Additional Staff: Height:            175.26 cm            Admit Date:           11/17/2023 Weight:            98.43 kg             Admission Status:     Inpatient -                                                               Routine BSA:               2.14 m2              Encounter#:           [id number]                                         Department Location:  [facility] Non                                                               Invasive Blood Pressure: 146 /94 mmHg Study Type:    TRANSTHORACIC ECHO (TTE) COMPLETE Diagnosis/ICD: Other cerebral infarction-I63.89 Indication:    Stroke CPT Code:      Echo Complete w Full [id number] Patient History: Pertinent History: Chest Pain. Stroke, Acute Coronary Syndrome. Study Detail: The following Echo studies were performed: 2D, Doppler, M-Mode and               color flow. Technically challenging study due to poor acoustic               windows, the patient's lack of cooperation and patient lying in               supine position. Definity used as a contrast agent for endocardial               border definition and agitated saline used as a contrast agent for               intraseptal flow evaluation.  PHYSICIAN INTERPRETATION: Left Ventricle: The left ventricular systolic function is moderately decreased, with an estimated ejection fraction of 35-40%. Wall motion is abnormal. The left ventricular cavity size is normal. Spectral Doppler shows an impaired relaxation pattern of left ventricular diastolic filling. Left Atrium:  The left atrium is normal in size. There is no evidence of a patent foramen ovale. A bubble study using agitated saline was performed. Bubble study is negative. Right Ventricle: The right ventricle is normal in size. There is normal right ventricular global systolic function. Right Atrium: The right atrium is normal in size. Aortic Valve: The aortic valve is trileaflet. There is moderate aortic valve thickening. There is evidence of mild aortic valve stenosis. There is no evidence of aortic valve regurgitation. The peak instantaneous gradient of the aortic valve is 4.8 mmHg. The mean gradient of the aortic valve is 3.0 mmHg. Mitral Valve: The mitral valve is normal in structure. There is trace mitral valve regurgitation. Tricuspid Valve: The tricuspid valve is structurally normal. There is trace tricuspid regurgitation. Pulmonic Valve: The pulmonic valve is not well visualized. The pulmonic valve regurgitation was not well visualized. Pericardium: There is no pericardial effusion noted. Aorta: The aortic root is normal. Systemic Veins: The inferior vena cava appears to be of normal size. In comparison to the previous echocardiogram(s): There are no prior studies on this patient for comparison purposes.  CONCLUSIONS:  1. Left ventricular systolic function is moderately decreased with a 35-40% estimated ejection fraction.  2. Spectral Doppler shows an impaired relaxation pattern of left ventricular diastolic filling.  3. Mild aortic valve stenosis.  4. There is no evidence of a patent foramen ovale. QUANTITATIVE DATA SUMMARY: 2D MEASUREMENTS:                           Normal Ranges: Ao Root d:     3.30 cm    (2.0-3.7cm) LAs:           3.50 cm    (2.7-4.0cm) IVSd:          1.30 cm    (0.6-1.1cm) LVPWd:         0.90 cm    (0.6-1.1cm) LVIDd:         5.40 cm    (3.9-5.9cm) LVIDs:         4.60 cm LV Mass Index: 109.8 g/m2 LV % FS        14.8 % LA VOLUME:                               Normal Ranges: LA Vol A4C:        56.2  ml    (22+/-6mL/m2) LA Vol A2C:        57.5 ml LA Vol BP:         57.5 ml LA Vol Index A4C:  26.3ml/m2 LA Vol Index A2C:  26.9 ml/m2 LA Vol Index BP:   26.9 ml/m2 LA Area A4C:       17.8 cm2 LA Area A2C:       18.2 cm2 LA Major Axis A4C: 4.8 cm LA Major Axis A2C: 4.9 cm LA Volume Index:   26.9 ml/m2 RA VOLUME BY A/L METHOD:                               Normal Ranges: RA Vol A4C:        38.6 ml    (8.3-19.5ml) RA Vol Index A4C:  18.0 ml/m2 RA Area A4C:       14.0 cm2 RA Major Axis A4C: 4.3 cm M-MODE MEASUREMENTS:                  Normal Ranges: Ao Root: 3.10 cm (2.0-3.7cm) LAs:     3.50 cm (2.7-4.0cm) AORTA MEASUREMENTS:                      Normal Ranges: Ao Sinus, d: 3.30 cm (2.1-3.5cm) Ao STJ, d:   2.50 cm (1.7-3.4cm) Asc Ao, d:   3.50 cm (2.1-3.4cm) LV SYSTOLIC FUNCTION BY 2D PLANIMETRY (MOD):                     Normal Ranges: EF-A4C View: 39.2 % (>=55%) EF-A2C View: 40.7 % EF-Biplane:  40.5 % LV DIASTOLIC FUNCTION:                               Normal Ranges: MV Peak E:        0.30 m/s    (0.7-1.2 m/s) MV Peak A:        0.57 m/s    (0.42-0.7 m/s) E/A Ratio:        0.53        (1.0-2.2) MV e'             0.03 m/s    (>8.0) MV lateral e'     0.03 m/s MV medial e'      0.04 m/s MV A Dur:         100.00 msec E/e' Ratio:       10.03       (<8.0) a'                0.05 m/s PulmV Sys Ravi:    38.10 cm/s PulmV [place] Ravi:   20.80 cm/s PulmV S/D Ravi:    1.80 PulmV A Revs Ravi: 25.10 cm/s PulmV A Revs Dur: 106.00 msec MITRAL VALVE:                 Normal Ranges: MV DT: 333 msec (150-240msec) AORTIC VALVE:                                   Normal Ranges: AoV Vmax:                1.10 m/s (<=1.7m/s) AoV Peak P.8 mmHg (<20mmHg) AoV Mean PG:             3.0 mmHg (1.7-11.5mmHg) LVOT Max Ravi:            0.69 m/s (<=1.1m/s) AoV VTI:                 14.90 cm (18-25cm) LVOT VTI:                8.59 cm LVOT Diameter:           2.00 cm  (1.8-2.4cm) AoV Area, VTI:           1.81 cm2 (2.5-5.5cm2) AoV Area,Vmax:            1.98 cm2 (2.5-4.5cm2) AoV Dimensionless Index: 0.58  RIGHT VENTRICLE: RV Basal 3.58 cm RV Mid   2.66 cm RV Major 7.1 cm TAPSE:   20.6 mm RV s'    0.13 m/s TRICUSPID VALVE/RVSP:                            Normal Ranges: Est. RA Pressure: 3 mmHg TV E Vmax:        0.28 m/s (0.3-0.7m/s) TV A Vmax:        0.37 m/s IVC Diam:         1.05 cm PULMONIC VALVE:                         Normal Ranges: PV Accel Time: 63 msec  (>120ms) PV Max Ravi:    0.7 m/s  (0.6-0.9m/s) PV Max P.0 mmHg Pulmonary Veins: PulmV A Revs Dur: 106.00 msec PulmV A Revs Ravi: 25.10 cm/s PulmV [place] Ravi:   20.80 cm/s PulmV S/D Ravi:    1.80 PulmV Sys Ravi:    38.10 cm/s  [id number] Paulo Jain MD Electronically signed on 2023 at 7:42:25 AM  ** Final **     MR brain wo IV contrast    Result Date: 2023  Interpreted By:  Liu Centeno, STUDY: MR BRAIN WO IV CONTRAST;  2023 10:48 pm   INDICATION: Signs/Symptoms:stroke.   COMPARISON: Noncontrast CT head of 2023.   ACCESSION NUMBER(S): [id number]   ORDERING CLINICIAN: SASHA CALDERON   TECHNIQUE: Axial T2, FLAIR, DWI, gradient echo T2 and sagittal and coronal T1 weighted images of brain were acquired.   FINDINGS: Exam is limited by motion artifact.   CSF Spaces: The ventricles, sulci and basal cisterns are within normal limits.   Parenchyma: There is focal reduced diffusion compatible with acute to subacute ischemia in right corona radiata extending into lateral aspect of the lentiform nucleus and medial aspect of external capsule. There is a background of confluence periventricular white matter T2/FLAIR hyperintense signal abnormality extending into subcortical white matter compatible with advanced chronic microvascular ischemia. There are cystic spaces in the basal ganglia, some with associated FLAIR hyperintensity, compatible with combination of prominent perivascular spaces and chronic lacunar infarcts. No evidence of recent hemorrhage. There is no mass effect or  midline shift.   Paranasal Sinuses and Mastoids: Visualized paranasal sinuses and mastoid air cells are unremarkable.       There is focal reduced diffusion compatible with acute to subacute ischemia in right corona radiata extending into lateral aspect of the lentiform nucleus and medial aspect of external capsule. No associated hemorrhage or mass effect.   MACRO: Liu Centeno discussed the significance and urgency of this critical finding by telephone with  Blaynemarely MontgomeryPrerna on 11/17/2023 at 11:21 pm.  (**-RCF-**) Findings:  See findings.   Signed by: Liu Centeno 11/17/2023 11:22 PM Dictation workstation:   [id number]    CT brain attack head wo IV contrast    Result Date: 11/17/2023  Interpreted By:  Bebeto Yadav and Tavana Shahrzad STUDY: CT ANGIO BRAIN ATTACK NECK W IV CONTRAST AND POST PROCEDURE; CT ANGIO BRAIN ATTACK HEAD W IV CONTRAST AND POST PROCEDURE; CT BRAIN ATTACK HEAD WO IV CONTRAST;  11/17/2023 2:05 pm; 11/17/2023 2:03 pm   INDICATION: Signs/Symptoms:Stroke Evaluation with VAN Positive; Signs/Symptoms:Stroke Evaluation   Left hemiplegia   COMPARISON: None.   ACCESSION NUMBER(S): [id number]; [id number]; [id number]   ORDERING CLINICIAN: REBECCA MOON   TECHNIQUE: Multiple contiguous axial noncontrast images of the head were obtained. Following IV contrast administration of 75 cc Omnipaque 350, a CT angiography of the head and neck was performed. MIPS and 3D reconstructions of the [place] and neck were created on an independent workstation and reviewed.   FINDINGS: NON-CONTRAST HEAD CT:   BRAIN PARENCHYMA: There are confluent areas hypoattenuation within the right cerebral hemisphere, nonspecific, but likely representing chronic microangiopathic changes in this age demographic.. Lacunar infarcts are noted in the basal ganglia bilaterally as well as the right centrum semiovale. No evidence of loss of gray-white matter differentiation. No evidence of acute intraparenchymal hemorrhage or  parenchymal evidence of an acute large territory ischemic infarct. No mass-effect, midline shift or effacement of cerebral sulci.   VENTRICLES and EXTRA-AXIAL SPACES:  No acute extra-axial or intraventricular hemorrhage. Ventricles and sulci are age-concordant.Mild ventriculomegaly with disproportionate enlargement of the sylvian fissures and crowding of the sulci at the vertices, which may be seen in the setting of normal pressure hydrocephalus.   PARANASAL SINUSES/MASTOIDS:  No hemorrhage or air-fluid levels within the visualized paranasal sinuses. The mastoids are well aerated.   CALVARIUM/ORBITS:  No skull fracture.  The orbits and globes are intact to the extent visualized.   EXTRACRANIAL SOFT TISSUES: No discernible abnormality.       CTA NECK:   Two vessel aortic arch, with common origin of the left common carotid and the brachiocephalic trunk. The origins of the arch vessels are widely patent.   LEFT VERTEBRAL ARTERY:  No hemodynamically significant stenosis, occlusion, or dissection.   LEFT COMMON/INTERNAL CAROTID ARTERY: The common carotid artery is patent.  Atherosclerotic calcification noted involving the carotid bulb extending into the proximal internal carotid artery resulting in 0% stenosis by NASCET criteria. Otherwise, no hemodynamically significant stenosis, occlusion, or dissection.   RIGHT VERTEBRAL ARTERY:  No hemodynamically significant stenosis, occlusion, or dissection.   RIGHT COMMON/INTERNAL CAROTID ARTERY: The common carotid artery is patent.  Atherosclerotic calcification noted involving the carotid bulb extending into the proximal internal carotid artery resulting in 0% stenosis by NASCET criteria. Otherwise, no hemodynamically significant stenosis, occlusion, or dissection.     The neck soft tissues show no evidence of mass, fluid collection, or enlarged lymph nodes. There is no acute osseous abnormality. Moderate degenerative changes of the cervical spine noted. Incidental note of an  azygous fissure in the right upper lobe. Prominent periapical lucency is noted involving the premolar and molar mandibular teeth on the right.       CTA HEAD:   ANTERIOR CIRCULATION: No aneurysm.   - Internal Carotid Arteries: Atherosclerotic calcification is noted involving the right lacerum, cavernous, and clinoid right ICA without hemodynamically significant stenosis. Atherosclerotic calcifications noted involving the cavernous and ophthalmic segments of the left ICA resulting in mild narrowing in the mid cavernous segment. Otherwise, no hemodynamically significant stenosis or occlusion.   - Middle Cerebral Arteries:  Mild narrowing of the left M1 segment (series 501, image 626). Possible minimal narrowing of the right M1 segment (series 501, image 639).   - Anterior Cerebral Arteries:  No hemodynamically significant stenosis or occlusion.     POSTERIOR CIRCULATION: No aneurysm.   - Intracranial Vertebral Arteries:  No hemodynamically significant stenosis or occlusion.   - Basilar Artery:  No hemodynamically significant stenosis or occlusion. Mild focal dolichoectasia of the mid basilar artery (series 501, image 606).   - Posterior Cerebral Arteries: Bilateral posterior communicating arteries are opacified. No hemodynamically significant stenosis or occlusion.           1. Multiple well-circumscribed hypodensities within the right right-greater-than-left basal ganglia and deep white matter, likely representing chronic lacunar infarcts. However, superimposed acute component can not be excluded. If there is high clinical suspicion, consider MRI for further evaluation. 2. Mild ventriculomegaly with disproportionate enlargement of the sylvian fissures and crowding of the sulci at the vertices, which may be seen in the setting of normal pressure hydrocephalus. Correlation with clinical exam recommended. 3. Periventricular white matter confluent hypodensities are nonspecific, but likely representing chronic  microangiopathic changes in this age demographic. 4. No evidence of source vessel arterial occlusion, flow significant stenosis, dissection, or aneurysm within the head and neck. Mild atherosclerotic changes of the intracranial vasculature as above. 5. Prominent periapical lucency is noted involving the premolar and molar mandibular teeth on the right, suggestive of periodontal disease. 6. Additional findings as above.   I personally reviewed the images/study and I agree with the findings as stated. This study was interpreted at [place], [place].   MACRO: None   Signed by: Bebeto Yadav 11/17/2023 2:34 PM Dictation workstation:   [id number]    CT angio brain attack head w IV contrast and post procedure    Result Date: 11/17/2023  Interpreted By:  Bebeto Yadav and Tavana Shahrzad STUDY: CT ANGIO BRAIN ATTACK NECK W IV CONTRAST AND POST PROCEDURE; CT ANGIO BRAIN ATTACK HEAD W IV CONTRAST AND POST PROCEDURE; CT BRAIN ATTACK HEAD WO IV CONTRAST;  11/17/2023 2:05 pm; 11/17/2023 2:03 pm   INDICATION: Signs/Symptoms:Stroke Evaluation with VAN Positive; Signs/Symptoms:Stroke Evaluation   Left hemiplegia   COMPARISON: None.   ACCESSION NUMBER(S): [id number]; [id number]; [id number]   ORDERING CLINICIAN: REBECCA MOON   TECHNIQUE: Multiple contiguous axial noncontrast images of the head were obtained. Following IV contrast administration of 75 cc Omnipaque 350, a CT angiography of the head and neck was performed. MIPS and 3D reconstructions of the [place] and neck were created on an independent workstation and reviewed.   FINDINGS: NON-CONTRAST HEAD CT:   BRAIN PARENCHYMA: There are confluent areas hypoattenuation within the right cerebral hemisphere, nonspecific, but likely representing chronic microangiopathic changes in this age demographic.. Lacunar infarcts are noted in the basal ganglia bilaterally as well as the right centrum semiovale. No evidence of loss  of gray-white matter differentiation. No evidence of acute intraparenchymal hemorrhage or parenchymal evidence of an acute large territory ischemic infarct. No mass-effect, midline shift or effacement of cerebral sulci.   VENTRICLES and EXTRA-AXIAL SPACES:  No acute extra-axial or intraventricular hemorrhage. Ventricles and sulci are age-concordant.Mild ventriculomegaly with disproportionate enlargement of the sylvian fissures and crowding of the sulci at the vertices, which may be seen in the setting of normal pressure hydrocephalus.   PARANASAL SINUSES/MASTOIDS:  No hemorrhage or air-fluid levels within the visualized paranasal sinuses. The mastoids are well aerated.   CALVARIUM/ORBITS:  No skull fracture.  The orbits and globes are intact to the extent visualized.   EXTRACRANIAL SOFT TISSUES: No discernible abnormality.       CTA NECK:   Two vessel aortic arch, with common origin of the left common carotid and the brachiocephalic trunk. The origins of the arch vessels are widely patent.   LEFT VERTEBRAL ARTERY:  No hemodynamically significant stenosis, occlusion, or dissection.   LEFT COMMON/INTERNAL CAROTID ARTERY: The common carotid artery is patent.  Atherosclerotic calcification noted involving the carotid bulb extending into the proximal internal carotid artery resulting in 0% stenosis by NASCET criteria. Otherwise, no hemodynamically significant stenosis, occlusion, or dissection.   RIGHT VERTEBRAL ARTERY:  No hemodynamically significant stenosis, occlusion, or dissection.   RIGHT COMMON/INTERNAL CAROTID ARTERY: The common carotid artery is patent.  Atherosclerotic calcification noted involving the carotid bulb extending into the proximal internal carotid artery resulting in 0% stenosis by NASCET criteria. Otherwise, no hemodynamically significant stenosis, occlusion, or dissection.     The neck soft tissues show no evidence of mass, fluid collection, or enlarged lymph nodes. There is no acute osseous  abnormality. Moderate degenerative changes of the cervical spine noted. Incidental note of an azygous fissure in the right upper lobe. Prominent periapical lucency is noted involving the premolar and molar mandibular teeth on the right.       CTA HEAD:   ANTERIOR CIRCULATION: No aneurysm.   - Internal Carotid Arteries: Atherosclerotic calcification is noted involving the right lacerum, cavernous, and clinoid right ICA without hemodynamically significant stenosis. Atherosclerotic calcifications noted involving the cavernous and ophthalmic segments of the left ICA resulting in mild narrowing in the mid cavernous segment. Otherwise, no hemodynamically significant stenosis or occlusion.   - Middle Cerebral Arteries:  Mild narrowing of the left M1 segment (series 501, image 626). Possible minimal narrowing of the right M1 segment (series 501, image 639).   - Anterior Cerebral Arteries:  No hemodynamically significant stenosis or occlusion.     POSTERIOR CIRCULATION: No aneurysm.   - Intracranial Vertebral Arteries:  No hemodynamically significant stenosis or occlusion.   - Basilar Artery:  No hemodynamically significant stenosis or occlusion. Mild focal dolichoectasia of the mid basilar artery (series 501, image 606).   - Posterior Cerebral Arteries: Bilateral posterior communicating arteries are opacified. No hemodynamically significant stenosis or occlusion.           1. Multiple well-circumscribed hypodensities within the right right-greater-than-left basal ganglia and deep white matter, likely representing chronic lacunar infarcts. However, superimposed acute component can not be excluded. If there is high clinical suspicion, consider MRI for further evaluation. 2. Mild ventriculomegaly with disproportionate enlargement of the sylvian fissures and crowding of the sulci at the vertices, which may be seen in the setting of normal pressure hydrocephalus. Correlation with clinical exam recommended. 3. Periventricular  white matter confluent hypodensities are nonspecific, but likely representing chronic microangiopathic changes in this age demographic. 4. No evidence of source vessel arterial occlusion, flow significant stenosis, dissection, or aneurysm within the head and neck. Mild atherosclerotic changes of the intracranial vasculature as above. 5. Prominent periapical lucency is noted involving the premolar and molar mandibular teeth on the right, suggestive of periodontal disease. 6. Additional findings as above.   I personally reviewed the images/study and I agree with the findings as stated. This study was interpreted at [place], [place].   MACRO: None   Signed by: Bebeto Yadav 11/17/2023 2:34 PM Dictation workstation:   [id number]    CT angio brain attack neck w IV contrast and post procedure    Result Date: 11/17/2023  Interpreted By:  Bebeto Yadav and Tavana Shahrzad STUDY: CT ANGIO BRAIN ATTACK NECK W IV CONTRAST AND POST PROCEDURE; CT ANGIO BRAIN ATTACK HEAD W IV CONTRAST AND POST PROCEDURE; CT BRAIN ATTACK HEAD WO IV CONTRAST;  11/17/2023 2:05 pm; 11/17/2023 2:03 pm   INDICATION: Signs/Symptoms:Stroke Evaluation with VAN Positive; Signs/Symptoms:Stroke Evaluation   Left hemiplegia   COMPARISON: None.   ACCESSION NUMBER(S): [id number]; [id number]; [id number]   ORDERING CLINICIAN: REBECCA MOON   TECHNIQUE: Multiple contiguous axial noncontrast images of the head were obtained. Following IV contrast administration of 75 cc Omnipaque 350, a CT angiography of the head and neck was performed. MIPS and 3D reconstructions of the [place] and neck were created on an independent workstation and reviewed.   FINDINGS: NON-CONTRAST HEAD CT:   BRAIN PARENCHYMA: There are confluent areas hypoattenuation within the right cerebral hemisphere, nonspecific, but likely representing chronic microangiopathic changes in this age demographic.. Lacunar infarcts are noted in the  basal ganglia bilaterally as well as the right centrum semiovale. No evidence of loss of gray-white matter differentiation. No evidence of acute intraparenchymal hemorrhage or parenchymal evidence of an acute large territory ischemic infarct. No mass-effect, midline shift or effacement of cerebral sulci.   VENTRICLES and EXTRA-AXIAL SPACES:  No acute extra-axial or intraventricular hemorrhage. Ventricles and sulci are age-concordant.Mild ventriculomegaly with disproportionate enlargement of the sylvian fissures and crowding of the sulci at the vertices, which may be seen in the setting of normal pressure hydrocephalus.   PARANASAL SINUSES/MASTOIDS:  No hemorrhage or air-fluid levels within the visualized paranasal sinuses. The mastoids are well aerated.   CALVARIUM/ORBITS:  No skull fracture.  The orbits and globes are intact to the extent visualized.   EXTRACRANIAL SOFT TISSUES: No discernible abnormality.       CTA NECK:   Two vessel aortic arch, with common origin of the left common carotid and the brachiocephalic trunk. The origins of the arch vessels are widely patent.   LEFT VERTEBRAL ARTERY:  No hemodynamically significant stenosis, occlusion, or dissection.   LEFT COMMON/INTERNAL CAROTID ARTERY: The common carotid artery is patent.  Atherosclerotic calcification noted involving the carotid bulb extending into the proximal internal carotid artery resulting in 0% stenosis by NASCET criteria. Otherwise, no hemodynamically significant stenosis, occlusion, or dissection.   RIGHT VERTEBRAL ARTERY:  No hemodynamically significant stenosis, occlusion, or dissection.   RIGHT COMMON/INTERNAL CAROTID ARTERY: The common carotid artery is patent.  Atherosclerotic calcification noted involving the carotid bulb extending into the proximal internal carotid artery resulting in 0% stenosis by NASCET criteria. Otherwise, no hemodynamically significant stenosis, occlusion, or dissection.     The neck soft tissues show no  evidence of mass, fluid collection, or enlarged lymph nodes. There is no acute osseous abnormality. Moderate degenerative changes of the cervical spine noted. Incidental note of an azygous fissure in the right upper lobe. Prominent periapical lucency is noted involving the premolar and molar mandibular teeth on the right.       CTA HEAD:   ANTERIOR CIRCULATION: No aneurysm.   - Internal Carotid Arteries: Atherosclerotic calcification is noted involving the right lacerum, cavernous, and clinoid right ICA without hemodynamically significant stenosis. Atherosclerotic calcifications noted involving the cavernous and ophthalmic segments of the left ICA resulting in mild narrowing in the mid cavernous segment. Otherwise, no hemodynamically significant stenosis or occlusion.   - Middle Cerebral Arteries:  Mild narrowing of the left M1 segment (series 501, image 626). Possible minimal narrowing of the right M1 segment (series 501, image 639).   - Anterior Cerebral Arteries:  No hemodynamically significant stenosis or occlusion.     POSTERIOR CIRCULATION: No aneurysm.   - Intracranial Vertebral Arteries:  No hemodynamically significant stenosis or occlusion.   - Basilar Artery:  No hemodynamically significant stenosis or occlusion. Mild focal dolichoectasia of the mid basilar artery (series 501, image 606).   - Posterior Cerebral Arteries: Bilateral posterior communicating arteries are opacified. No hemodynamically significant stenosis or occlusion.           1. Multiple well-circumscribed hypodensities within the right right-greater-than-left basal ganglia and deep white matter, likely representing chronic lacunar infarcts. However, superimposed acute component can not be excluded. If there is high clinical suspicion, consider MRI for further evaluation. 2. Mild ventriculomegaly with disproportionate enlargement of the sylvian fissures and crowding of the sulci at the vertices, which may be seen in the setting of normal  pressure hydrocephalus. Correlation with clinical exam recommended. 3. Periventricular white matter confluent hypodensities are nonspecific, but likely representing chronic microangiopathic changes in this age demographic. 4. No evidence of source vessel arterial occlusion, flow significant stenosis, dissection, or aneurysm within the head and neck. Mild atherosclerotic changes of the intracranial vasculature as above. 5. Prominent periapical lucency is noted involving the premolar and molar mandibular teeth on the right, suggestive of periodontal disease. 6. Additional findings as above.   I personally reviewed the images/study and I agree with the findings as stated. This study was interpreted at [place], [place].   MACRO: None   Signed by: Bebeto Yadav 11/17/2023 2:34 PM Dictation workstation:   [id number]      Assessment/Plan    62-year-old -American male, who is known to history of hypertension, history of cocaine use, hyperlipidemia, he came to the emergency department, he was sent to the emergency department as ambulance was called that he fell and he was unable to get up and he had dense left-sided weakness.  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  Hypertension blood pressure is acceptable.  PT OT consult.  Echocardiogram his ejection fraction is only 30%, we will also get a cardiology consult,.  Discharge plans to rehab as he knows that he is unable to walk or transfer by himself.    I spent 25 minutes in the professional and overall care of this patient.    Brandi Titus MD

## 2023-11-20 NOTE — CARE PLAN
Problem: General Stroke  Goal: Demonstrate improvement in neurological exam throughout the shift  11/20/2023 1601 by Raine Rahman RN  Outcome: Progressing  11/20/2023 1600 by Raine Rahman RN  Outcome: Progressing  Goal: Maintain BP within ordered limits throughout shift  11/20/2023 1601 by Raine Rahman RN  Outcome: Progressing  11/20/2023 1600 by Raine Rahman RN  Outcome: Progressing  Goal: No symptoms of aspiration throughout shift  11/20/2023 1601 by Raine Rahman RN  Outcome: Progressing  11/20/2023 1600 by Raine Rahman RN  Outcome: Progressing     Problem: Skin  Goal: Prevent/manage excess moisture  11/20/2023 1601 by Raine Rahman RN  Outcome: Progressing  Flowsheets (Taken 11/20/2023 1601)  Prevent/manage excess moisture:   Cleanse incontinence/protect with barrier cream   Follow provider orders for dressing changes   Moisturize dry skin   Monitor for/manage infection if present   Use wicking fabric (obtain order)  11/20/2023 1600 by Raine Rahman RN  Outcome: Progressing  Goal: Prevent/minimize sheer/friction injuries  11/20/2023 1601 by Raine Rahman RN  Outcome: Progressing  11/20/2023 1600 by Raine Rahman RN  Outcome: Progressing  Goal: Promote skin healing  11/20/2023 1601 by Raine Rahman RN  Outcome: Progressing  11/20/2023 1600 by Raine Rahman RN  Outcome: Progressing     Problem: Fall/Injury  Goal: Not fall by end of shift  11/20/2023 1601 by Raine Rahman RN  Outcome: Progressing  11/20/2023 1600 by Raine Rahman RN  Outcome: Progressing  Goal: Be free from injury by end of the shift  11/20/2023 1601 by Raine Rahman RN  Outcome: Progressing  11/20/2023 1600 by Raine Rahman RN  Outcome: Progressing  Goal: Verbalize understanding of personal risk factors for fall in the hospital  11/20/2023 1601 by Raine Rahman RN  Outcome: Progressing  11/20/2023 1600 by Raine Rahman RN  Outcome: Progressing     Problem: Pain -  Adult  Goal: Verbalizes/displays adequate comfort level or baseline comfort level  11/20/2023 1601 by Raine Rahman RN  Outcome: Progressing  11/20/2023 1600 by Raine Rahman RN  Outcome: Progressing     Problem: Safety - Adult  Goal: Free from fall injury  11/20/2023 1601 by Raine Rahman RN  Outcome: Progressing  11/20/2023 1600 by Raine Rahman RN  Outcome: Progressing     Problem: Discharge Planning  Goal: Discharge to home or other facility with appropriate resources  11/20/2023 1601 by Raine Rahman RN  Outcome: Progressing  11/20/2023 1600 by Raine Rahman RN  Outcome: Progressing     Problem: Chronic Conditions and Co-morbidities  Goal: Patient's chronic conditions and co-morbidity symptoms are monitored and maintained or improved  11/20/2023 1601 by Raine Rahman RN  Outcome: Progressing  11/20/2023 1600 by Raine Rahman RN  Outcome: Progressing   The patient's goals for the shift include      The clinical goals for the shift include pt. will have no decrease in neurological function    Over the shift, the patient did not make progress toward the following goals. Barriers to progression include . Recommendations to address these barriers include .

## 2023-11-20 NOTE — CONSULTS
Inpatient consult to Cardiology  Consult performed by: Thierry HARPER MD  Consult ordered by: Kathryn Rincon, APRN-CNP  Reason for consult: HF/CM EF 30%        History Of Present Illness:    Román Marinelli is a 68 y.o. male with past medical history significant for Coronary artery disease status post MI with  multiple PCIs including PTCA to RCA x2 stesnt 3/2007, Mid RCA stent 9/2010, PCI to RCA and distal RCA 11/2019 , Ischemic cardiomyopathy LVEF 40-45% on LV gram/LHC 3/2021, Cocaine use, Obesity, Hypertension. Presented with  left sided weakness and facial droop. Cardiology is consulted for HF/CM EF 30%.       Patient presented with left sided weakness and a facial droop.  States he woke up and was unable to walk.  Stroke protocol was called and patient was diagnosed with acute thrombotic right corona infarct.  Echocardiogram was done which showed  LVEF 35-40% impaired relaxation mild aortic stenosis.  Cardiology was consulted for further evaluation.  Patient denies any cardiac symptoms including chest pain or shortness of breath.  States he has not had chest pain in a long time.  Review of records show patient had a history of CAD status post multiple MIs and PCI's. Last cardiac cath 3/8/21 (showing 60-70% mid LAD, 70-80% distal LAD, 70-80% proximal small Cx,  of the RCA, LVEF 40-45%-treated medically.    He was admitted for chest pain November 2022.  At that time he underwent a treadmill stress MPI which showed no new territories of ischemia.  He was reportedly on carvedilol, aspirin, atorvastatin, isosorbide and lisinopril. Patient admits he has not been taking any of his medications at home including aspirin.  EKG this admission showed normal sinus rhythm possible left atrial enlargement LVH with repolarization abnormalities prior inferior infarct.  High-sensitivity troponin negative x1.        Last Recorded Vitals:  Vitals:    11/19/23 1600 11/19/23 1900 11/20/23 0302 11/20/23 0826   BP:  137/85  "104/64 (!) 162/93   BP Location:  Right arm Right arm Right arm   Patient Position:  Lying Lying Lying   Pulse: 61 60 70 59   Resp:  18 18 17   Temp:  36.3 °C (97.4 °F) 36.6 °C (97.8 °F) 36.4 °C (97.5 °F)   TempSrc:  Oral Temporal Temporal   SpO2:  96% 97% 95%   Weight:       Height:           Last Labs:  LABS:  CMP:  Results from last 7 days   Lab Units 11/20/23  0529 11/19/23  1145 11/18/23  2330 11/18/23  0613 11/17/23  1407   SODIUM mmol/L 137 137  --  138 136   POTASSIUM mmol/L 3.8 4.0  --  3.9 2.1*   CHLORIDE mmol/L 108* 108*  --  107 115*   CO2 mmol/L 21 20*  --  21 16*   ANION GAP mmol/L 12 13  --  14 7*   BUN mg/dL 24* 28*  --  17 7   CREATININE mg/dL 1.23 1.28  --  1.33* 0.44*   EGFR mL/min/1.73m*2 65 62  --  59* >90   MAGNESIUM mg/dL  --   --  2.00  --   --    ALBUMIN g/dL  --   --   --   --  2.0*   ALT U/L  --   --   --   --  7*   AST U/L  --   --   --   --  8*   BILIRUBIN TOTAL mg/dL  --   --   --   --  0.5     CBC:  Results from last 7 days   Lab Units 11/20/23  0529 11/19/23  1145 11/18/23  0612 11/17/23  1407   WBC AUTO x10*3/uL 7.7 7.2 8.9 8.2   HEMOGLOBIN g/dL 14.8 14.9 15.8 14.4   HEMATOCRIT % 45.9 46.3 48.1 44.5   PLATELETS AUTO x10*3/uL 245 254 254 240   MCV fL 84 85 84 83     COAG:   Results from last 7 days   Lab Units 11/17/23  1407   INR  1.3*     ABO: No results found for: \"ABO\"  HEME/ENDO:  Results from last 7 days   Lab Units 11/17/23  1831   HEMOGLOBIN A1C % 5.9*      CARDIAC:   Results from last 7 days   Lab Units 11/17/23  1831 11/17/23  1407   TROPHS ng/L  --  19   BNP pg/mL 202*  --       Transthoracic Echo (TTE) Complete   Final Result      MR brain wo IV contrast   Final Result   There is focal reduced diffusion compatible with acute to subacute   ischemia in right corona radiata extending into lateral aspect of the   lentiform nucleus and medial aspect of external capsule. No   associated hemorrhage or mass effect.        MACRO:   Liu Centeno discussed the significance and " urgency of this critical   finding by telephone with  Blayne MontgomeryPrerna on 11/17/2023 at 11:21   pm.  (**-RCF-**) Findings:  See findings.        Signed by: Liu Centeno 11/17/2023 11:22 PM   Dictation workstation:   [id number]      CT angio brain attack head w IV contrast and post procedure   Final Result   1. Multiple well-circumscribed hypodensities within the right   right-greater-than-left basal ganglia and deep white matter, likely   representing chronic lacunar infarcts. However, superimposed acute   component can not be excluded. If there is high clinical suspicion,   consider MRI for further evaluation.   2. Mild ventriculomegaly with disproportionate enlargement of the   sylvian fissures and crowding of the sulci at the vertices, which may   be seen in the setting of normal pressure hydrocephalus. Correlation   with clinical exam recommended.   3. Periventricular white matter confluent hypodensities are   nonspecific, but likely representing chronic microangiopathic changes   in this age demographic.   4. No evidence of source vessel arterial occlusion, flow significant   stenosis, dissection, or aneurysm within the head and neck. Mild   atherosclerotic changes of the intracranial vasculature as above.   5. Prominent periapical lucency is noted involving the premolar and   molar mandibular teeth on the right, suggestive of periodontal   disease.   6. Additional findings as above.        I personally reviewed the images/study and I agree with the findings   as stated. This study was interpreted at [facility], [place], [place].        MACRO:   None        Signed by: Bebeto Yadav 11/17/2023 2:34 PM   Dictation workstation:   [id number]      CT angio brain attack neck w IV contrast and post procedure   Final Result   1. Multiple well-circumscribed hypodensities within the right   right-greater-than-left basal ganglia and deep white matter, likely   representing chronic  lacunar infarcts. However, superimposed acute   component can not be excluded. If there is high clinical suspicion,   consider MRI for further evaluation.   2. Mild ventriculomegaly with disproportionate enlargement of the   sylvian fissures and crowding of the sulci at the vertices, which may   be seen in the setting of normal pressure hydrocephalus. Correlation   with clinical exam recommended.   3. Periventricular white matter confluent hypodensities are   nonspecific, but likely representing chronic microangiopathic changes   in this age demographic.   4. No evidence of source vessel arterial occlusion, flow significant   stenosis, dissection, or aneurysm within the head and neck. Mild   atherosclerotic changes of the intracranial vasculature as above.   5. Prominent periapical lucency is noted involving the premolar and   molar mandibular teeth on the right, suggestive of periodontal   disease.   6. Additional findings as above.        I personally reviewed the images/study and I agree with the findings   as stated. This study was interpreted at [place], [place].        MACRO:   None        Signed by: Bebeto Yadav 11/17/2023 2:34 PM   Dictation workstation:   [id number]      CT brain attack head wo IV contrast   Final Result   1. Multiple well-circumscribed hypodensities within the right   right-greater-than-left basal ganglia and deep white matter, likely   representing chronic lacunar infarcts. However, superimposed acute   component can not be excluded. If there is high clinical suspicion,   consider MRI for further evaluation.   2. Mild ventriculomegaly with disproportionate enlargement of the   sylvian fissures and crowding of the sulci at the vertices, which may   be seen in the setting of normal pressure hydrocephalus. Correlation   with clinical exam recommended.   3. Periventricular white matter confluent hypodensities are   nonspecific, but likely  representing chronic microangiopathic changes   in this age demographic.   4. No evidence of source vessel arterial occlusion, flow significant   stenosis, dissection, or aneurysm within the head and neck. Mild   atherosclerotic changes of the intracranial vasculature as above.   5. Prominent periapical lucency is noted involving the premolar and   molar mandibular teeth on the right, suggestive of periodontal   disease.   6. Additional findings as above.        I personally reviewed the images/study and I agree with the findings   as stated. This study was interpreted at [facility], [place], [place].        MACRO:   None        Signed by: Bebeto Yadav 11/17/2023 2:34 PM   Dictation workstation:   [id number]         Last I/O:    Intake/Output Summary (Last 24 hours) at 11/20/2023 1403  Last data filed at 11/19/2023 1600  Gross per 24 hour   Intake --   Output 500 ml   Net -500 ml        Past Cardiology Tests (Last 3 Years):  EKG:  No results found for this or any previous visit from the past 1095 days.    Echo:  Transthoracic Echo (TTE) Complete 11/19/2023   1. Left ventricular systolic function is moderately decreased with a 35-40% estimated ejection fraction.   2. Spectral Doppler shows an impaired relaxation pattern of left ventricular diastolic filling.   3. Mild aortic valve stenosis.   4. There is no evidence of a patent foramen ovale      Ejection Fractions:  EF   Date/Time Value Ref Range Status   11/18/2023 12:13 PM 41       Cath:  Left heart cath 3/08/2021  Right dominant circulation  LMT - normal  LAD - 60-70% mid; 70-80% distal  LCX - small, non dominant; 70-80% proximal; relatively small vessel.  RCA - total mid occlusion.  LVGram - very limited contrast injection. Diffuse hypokinesis. LVEF 40-45%.   CONCLUSIONS:   Total occlusion of the RCA  70-80% lesion in a small non dominant LCX.  60-70% proximal LAD and 70-80% very distal LAD.    RECOMMENDATIONS:  Medical  treatment   Risk reduction  Cessation of cocaine and tobacco abuse.       03/02/2007  Successful PTCA/2x stent deployment to severe mid-right  coronary artery stenosis with 0% angiographic residual and  restoration of JOSHUA Grade III antegrade flow.    09/10/2010  PTCA/2x stent deployment to severe mid-right  coronary artery stenosis with 0% angiographic residual and  restoration of JOSHUA Grade III antegrade flow.  Aspiration thrombectomy was performed  with a Pronto LP catheter and returned large amounts of white clot. The  lesion in the mid RCA was pre-dilated with a 2.0/12 Voyager Rx (10 kristina)  and stented with a Vision 2.73/23 BMS    11/27/2019  NSTEMI.  Native Coronary Artery Disease in the LAD (Moderate) and RCA (Severe)  Angioplasty and Synergy (Drug Eluting Stent) Placement in Proximal, Mid and Distal RCA       Stress Test:  Nuclear stress test 11/25/2022  1. SPECT Perfusion Study: Abnormal.  2. There is no scintigraphic evidence for inducible ischemia.  3. There is a moderate (10-20%) fixed perfusion defect in the RCA  territory.  4. Left ventricle is moderately dilated. The left ventricle systolic  function is mildly decreased.  5. Right ventricle is normal in size. The right ventricle systolic  function is normal.  6. This is an intermediate risk scan due to area of scar/ischemia and  calculated LVEF.           Gated Stress FBP Gated Rest FBP  LVEF % 53               44       Cardiac Imaging:  No results found for this or any previous visit from the past 1095 days.      Past Medical History:  He has a past medical history of Smoking.    Past Surgical History:  He has no past surgical history on file.      Social History:  He has no history on file for tobacco use, alcohol use, and drug use.    Family History:  No family history on file.     Allergies:  Lactose, Amlodipine, and Metoprolol    Inpatient Medications:  Scheduled medications   Medication Dose Route Frequency    aspirin  81 mg oral Daily     atorvastatin  80 mg oral Nightly    clopidogrel  75 mg oral Daily    enoxaparin  40 mg subcutaneous Daily    melatonin  3 mg oral Daily    perflutren lipid microspheres  0.5-10 mL of dilution intravenous Once in imaging    perflutren protein A microsphere  0.5 mL intravenous Once in imaging    sennosides  2 tablet oral BID    sulfur hexafluoride microsphr  2 mL intravenous Once in imaging     PRN medications   Medication    acetaminophen    Or    acetaminophen    Or    acetaminophen    acetaminophen    Or    acetaminophen    Or    acetaminophen    hydrALAZINE    magnesium hydroxide    ondansetron    Or    ondansetron    oxygen     Continuous Medications   Medication Dose Last Rate     Outpatient Medications:  No current outpatient medications    Physical Exam:  GENERAL: alert, cooperative, pleasant, in no acute distress  SKIN: warm, dry  NECK: no JVD  CARDIAC: Regular rate and rhythm no murmurs  CHEST: Normal respiratory efforts, lungs clear to auscultation bilaterally.  ABDOMEN: soft, nondistended  EXTREMITIES: no lower extremity edema  NEURO: Alert and oriented x 3.  Grossly normal.  Moves all 4 extremities.      Assessment/Plan   Román Marinelli is a 68 y.o. male with past medical history significant for Coronary artery disease status post MI with  multiple PCIs including PTCA to RCA x2 stesnt 3/2007, Mid RCA stent 9/2010, PCI to RCA and distal RCA 11/2019 , Ischemic cardiomyopathy LVEF 40-45% on LV gram/LHC 3/2021, Cocaine use, Obesity, Hypertension. Presented with  left sided weakness and facial droop. Cardiology is consulted for HF/CM EF 30%.  Patient reports diagnosed with acute CVA.  Was seen by neurology.  Started on aspirin, Plavix and statin.  Admits to medication noncompliance.  Was not taking any of his medications including aspirin.    Ischemic cardiomyopathy- echocardiogram obtained this admission showed LVEF 35-40%.  review of records show patient has a history of cardiomyopathy.  LV gram and left  heart cath showed LVEF 40-45% 3/2021.  Unable to locate any prior echocardiograms.  Patient denies any ischemic symptoms.    He is euvolemic on clinical exam.      Recommend starting patient on GDMT with Entresto 24/26 mg twice daily add Imdur 30 mg daily.  May consider beta-blocker tomorrow.    Continue aspirin 81 mg daily and atorvastatin 80 mg daily.  Was also started on Plavix 75 mg daily per neurology for management of CVA.    Continue to monitor on telemetry    Code Status:  Full Code      Thank you for allowing me to participate in their care.  Please feel free to call me with any further questions or concerns.    Thierry Mcmahan MD, FAC, RONEY SHARIF  Division of Cardiovascular Medicine  Medical Director, [facility]  [facility]  Assistant Clinical Professor, Medicine  [employer]  [email or web address]  Office:  [phone]     Both the KERRI and I have had a face to face encounter with the patient today. I have examined the patient and edited the documented physical examination as necessary.  I personally reviewed the patient's vital signs, telemetry, recent labs, medications, orders, EKGs, and pertinent cardiac imaging/ echocardiography.  I have reviewed the KERRI's encounter note, approve the KERRI's documentation and have edited the note to reflect my diagnostic and therapeutic plan.

## 2023-11-20 NOTE — PROGRESS NOTES
11/20/23 1107   Discharge Planning   Living Arrangements Family members   Support Systems Family members   Assistance Needed Independent   Type of Residence Private residence   Number of Stairs to Enter Residence 3   Number of Stairs Within Residence 0   Do you have animals or pets at home? No   Who is requesting discharge planning? Provider   Home or Post Acute Services Post acute facilities (Rehab/SNF/etc)   Type of Post Acute Facility Services Rehab   Patient expects to be discharged to: [facility]   Does the patient need discharge transport arranged? Yes   RoundTrip coordination needed? Yes   Has discharge transport been arranged? No   Financial Resource Strain   How hard is it for you to pay for the very basics like food, housing, medical care, and heating? Not very   Housing Stability   In the last 12 months, was there a time when you were not able to pay the mortgage or rent on time? N   In the last 12 months, how many places have you lived? 1   In the last 12 months, was there a time when you did not have a steady place to sleep or slept in a shelter (including now)? N   Transportation Needs   In the past 12 months, has lack of transportation kept you from medical appointments or from getting medications? no   In the past 12 months, has lack of transportation kept you from meetings, work, or from getting things needed for daily living? No   Patient Choice   Provider Choice list and CMS website (https://medicare.gov/care-compare#search) for post-acute Quality and Resource Measure Data were provided and reviewed with: Patient   Patient / Family choosing to utilize agency / facility established prior to hospitalization No          Met with patient at bedside and explained my role as care coordinator. Patient lives with his sister and step father in the house. He is independent with his care at home. Denies use of any ambulatory devices. No oxygen in use at home, no HD. [facility] He is able to afford medications and to get to his doctors appointments. Patient fell at home and came in with left side weakness and CVA. Explained to patient about PT/OT and rec for High and rehab placement. Patient agreed. List printed according to geographic location and insurance acceptance and given to patient for choices. His choices were: 1. [facility]   2. [facility]  3. [facility]. Requested from [facility] to place referrals for rehabs.  Will follow for acceptance. Patient will need auth.

## 2023-11-20 NOTE — PROGRESS NOTES
Occupational Therapy    Evaluation    Patient Name: Román Marinelli  MRN: [id number]  Today's Date: 11/20/2023  Time Calculation  Start Time: 1336  Stop Time: 1353  Time Calculation (min): 17 min        Assessment:  End of Session Communication: Bedside nurse  End of Session Patient Position: Bed, 3 rail up, Alarm on  Strengths: Premorbid level of function  Barriers to Participation: Insight into problems, Attitude of self  Plan:  Treatment Interventions: ADL retraining, Functional transfer training, UE strengthening/ROM, Endurance training, Equipment evaluation/education, Neuromuscular reeducation  OT Frequency: 4 times per week  OT Discharge Recommendations: High intensity level of continued care  OT Recommended Transfer Status: Maximum assist  Treatment Interventions: ADL retraining, Functional transfer training, UE strengthening/ROM, Endurance training, Equipment evaluation/education, Neuromuscular reeducation    Subjective   Current Problem:  1. Acute arterial ischemic stroke, multifocal, mult vascular territories (CMS/HCC)  Transthoracic Echo (TTE) Complete    Transthoracic Echo (TTE) Complete      2. Ischemic stroke (CMS/HCC)          General:  General  Reason for Referral: Stroke  Referred By: BRANDON Escobar  Past Medical History Relevant to Rehab: HTN, h/o cocaine abuse, HLD, smoker  Family/Caregiver Present: No  Co-Treatment: PT  Co-Treatment Reason: Co-tx to maximize pt. participation  Prior to Session Communication: Bedside nurse, Physician (Cardiologist)  Patient Position Received: Bed, 3 rail up  Preferred Learning Style: verbal, visual  General Comment: Pt supine in bed upon PT arrival. Cleared to participate with RN, and agreeable to PT evaluation  Precautions:  Medical Precautions: Fall precautions  Vital Signs:     Pain:  Pain Assessment  Pain Assessment: 0-10  Pain Score: 0 - No pain  Pain Location: Chest  Pain Orientation: Left  Pain Frequency: Intermittent  Pain Onset: Sudden  Clinical Progression:  Resolved    Objective   Cognition:  Orientation Level: Oriented X4  Insight: Moderate  Impulsive: Mildly           Home Living:  Type of Home: House  Lives With: Siblings, Parent(s)  Home Adaptive Equipment: None  Home Layout: One level  Home Access: Stairs to enter with rails  Entrance Stairs-Rails: Both  Entrance Stairs-Number of Steps: 3-4  Bathroom Shower/Tub: Tub/shower unit  Bathroom Toilet: Handicapped height  Bathroom Equipment: Grab bars in shower  Prior Function:  Level of [place]: Independent with ADLs and functional transfers, Independent with homemaking with ambulation  ADL Assistance: Independent  Homemaking Assistance: Independent  Ambulatory Assistance: Independent  Vocational: Retired  Hand Dominance: Right  IADL History:     ADL:     Activity Tolerance:  Endurance: Decreased tolerance for upright activites  Bed Mobility/Transfers: Bed Mobility  Bed Mobility: Yes  Bed Mobility 1  Bed Mobility 1: Supine to sitting  Level of Assistance 1: Maximum assistance, Minimal verbal cues  Bed Mobility 2  Bed Mobility  2: Sitting to supine  Level of Assistance 2: Maximum assistance  Bed Mobility Comments 2: Assist with BLE lift into bed and trunk  lowering to bed  Bed Mobility 3  Bed Mobility 3: Scooting  Level of Assistance 3: Moderate verbal cues  Bed Mobility Comments 3: About 3-4 scoots right. Assist with weight shifting and upper body when scooting right.    Transfers  Transfer: Yes  Transfer 1  Technique 1: Sit to stand  Transfer Level of Assistance 1: Arm in arm assistance, Moderate assistance, +2  Trials/Comments 1: x2 trials. Intial forward flexion at trunk in standing. Pt able to achieve more upright posture with VC/TC. Dizziness reported in standing.  Transfers 2  Technique 2: Stand to sit  Transfer Level of Assistance 2: Arm in arm assistance, Moderate assistance, +2  Trials/Comments 2: x2 trials. VC for UE reach without return demonstration. Assist to control descent to sitting surface    Sensation:  Light Touch: Partial deficits in the LUE        Hand Function:  Gross Grasp:  (R UE grasp WFL; L UE decreased grasp)  Extremities: RUE   RUE : Within Functional Limits and LUE   LUE: Exceptions to WFL  LUE AROM (degrees)  LUE AROM Comment: Limited to < 20*        Outcome Measures:[facility] Daily Activity  Putting on and taking off regular lower body clothing: Total  Bathing (including washing, rinsing, drying): A lot  Putting on and taking off regular upper body clothing: A lot  Toileting, which includes using toilet, bedpan or urinal: Total  Taking care of personal grooming such as brushing teeth: A lot  Eating Meals: A little  Daily Activity - Total Score: 11        Education Documentation  Body Mechanics, taught by Avelina Hernandez OT at 11/20/2023  2:32 PM.  Learner: Patient  Readiness: Acceptance  Method: Explanation  Response: Verbalizes Understanding, Demonstrated Understanding, Needs Reinforcement    ADL Training, taught by Avelina Hernandez OT at 11/20/2023  2:32 PM.  Learner: Patient  Readiness: Acceptance  Method: Explanation  Response: Verbalizes Understanding, Demonstrated Understanding, Needs Reinforcement    Education Comments  No comments found.        OP EDUCATION:  Education  Individual(s) Educated: Patient    Goals:  Encounter Problems       Encounter Problems (Active)       ADLs       Patient with complete upper body dressing with contact guard assist level of assistance donning and doffing all UE clothes with no adaptive equipment while supported sitting       Start:  11/20/23    Expected End:  12/04/23            Patient with complete lower body dressing with moderate assist level of assistance donning and doffing all LE clothes  with reacher, shoe horn, and sock-aid while supported sitting       Start:  11/20/23    Expected End:  12/04/23            Patient will complete daily grooming tasks brushing teeth, shaving, and washing face/hair with contact guard assist level of assistance and PRN  adaptive equipment while supported sitting.       Start:  11/20/23    Expected End:  12/04/23            Patient will complete toileting including hygiene clothing management/hygiene with moderate assist level of assistance and raised toilet seat, grab bars, and bedside commode.       Start:  11/20/23    Expected End:  12/04/23               Balance       Goal 1 (Progressing)       Start:  11/18/23    Expected End:  12/02/23       Pt performs all sitting balance with mod assist x 1 and standing balance with mod assist x 2 with LRAD                    TRANSFERS       Patient will perform bed mobility minimal assist  level of assistance and bed rails in order to improve safety and independence with mobility       Start:  11/20/23    Expected End:  12/04/23            Patient will complete sit to stand transfer with minimal assist  level of assistance and least restrictive device in order to improve safety and prepare for out of bed mobility.       Start:  11/20/23    Expected End:  12/04/23

## 2023-11-21 LAB
ANION GAP SERPL CALC-SCNC: 12 MMOL/L (ref 10–20)
BUN SERPL-MCNC: 24 MG/DL (ref 6–23)
CALCIUM SERPL-MCNC: 8.3 MG/DL (ref 8.6–10.3)
CHLORIDE SERPL-SCNC: 106 MMOL/L (ref 98–107)
CO2 SERPL-SCNC: 23 MMOL/L (ref 21–32)
CREAT SERPL-MCNC: 1.42 MG/DL (ref 0.5–1.3)
ERYTHROCYTE [DISTWIDTH] IN BLOOD BY AUTOMATED COUNT: 14 % (ref 11.5–14.5)
GFR SERPL CREATININE-BSD FRML MDRD: 54 ML/MIN/1.73M*2
GLUCOSE BLD MANUAL STRIP-MCNC: 108 MG/DL (ref 74–99)
GLUCOSE BLD MANUAL STRIP-MCNC: 137 MG/DL (ref 74–99)
GLUCOSE SERPL-MCNC: 98 MG/DL (ref 74–99)
HCT VFR BLD AUTO: 44.6 % (ref 41–52)
HGB BLD-MCNC: 14.4 G/DL (ref 13.5–17.5)
MCH RBC QN AUTO: 27.4 PG (ref 26–34)
MCHC RBC AUTO-ENTMCNC: 32.3 G/DL (ref 32–36)
MCV RBC AUTO: 85 FL (ref 80–100)
NRBC BLD-RTO: 0 /100 WBCS (ref 0–0)
PLATELET # BLD AUTO: 253 X10*3/UL (ref 150–450)
POTASSIUM SERPL-SCNC: 4.2 MMOL/L (ref 3.5–5.3)
RBC # BLD AUTO: 5.26 X10*6/UL (ref 4.5–5.9)
SODIUM SERPL-SCNC: 137 MMOL/L (ref 136–145)
WBC # BLD AUTO: 7.6 X10*3/UL (ref 4.4–11.3)

## 2023-11-21 PROCEDURE — 97110 THERAPEUTIC EXERCISES: CPT | Mod: GP

## 2023-11-21 PROCEDURE — 82947 ASSAY GLUCOSE BLOOD QUANT: CPT

## 2023-11-21 PROCEDURE — 1100000001 HC PRIVATE ROOM DAILY

## 2023-11-21 PROCEDURE — 85027 COMPLETE CBC AUTOMATED: CPT | Performed by: NURSE PRACTITIONER

## 2023-11-21 PROCEDURE — 94760 N-INVAS EAR/PLS OXIMETRY 1: CPT

## 2023-11-21 PROCEDURE — 2500000001 HC RX 250 WO HCPCS SELF ADMINISTERED DRUGS (ALT 637 FOR MEDICARE OP): Performed by: INTERNAL MEDICINE

## 2023-11-21 PROCEDURE — 2500000004 HC RX 250 GENERAL PHARMACY W/ HCPCS (ALT 636 FOR OP/ED): Performed by: INTERNAL MEDICINE

## 2023-11-21 PROCEDURE — 99233 SBSQ HOSP IP/OBS HIGH 50: CPT | Performed by: STUDENT IN AN ORGANIZED HEALTH CARE EDUCATION/TRAINING PROGRAM

## 2023-11-21 PROCEDURE — 2500000001 HC RX 250 WO HCPCS SELF ADMINISTERED DRUGS (ALT 637 FOR MEDICARE OP): Performed by: NURSE PRACTITIONER

## 2023-11-21 PROCEDURE — 96372 THER/PROPH/DIAG INJ SC/IM: CPT | Performed by: INTERNAL MEDICINE

## 2023-11-21 PROCEDURE — 36415 COLL VENOUS BLD VENIPUNCTURE: CPT | Performed by: NURSE PRACTITIONER

## 2023-11-21 PROCEDURE — 96105 ASSESSMENT OF APHASIA: CPT | Mod: GN

## 2023-11-21 PROCEDURE — 80048 BASIC METABOLIC PNL TOTAL CA: CPT | Performed by: NURSE PRACTITIONER

## 2023-11-21 PROCEDURE — 97530 THERAPEUTIC ACTIVITIES: CPT | Mod: GP

## 2023-11-21 RX ADMIN — SENNOSIDES 17.2 MG: 8.6 TABLET, FILM COATED ORAL at 08:53

## 2023-11-21 RX ADMIN — Medication 3 MG: at 21:45

## 2023-11-21 RX ADMIN — ISOSORBIDE MONONITRATE 30 MG: 30 TABLET, EXTENDED RELEASE ORAL at 08:53

## 2023-11-21 RX ADMIN — SACUBITRIL AND VALSARTAN 1 TABLET: 24; 26 TABLET, FILM COATED ORAL at 08:53

## 2023-11-21 RX ADMIN — CLOPIDOGREL 75 MG: 75 TABLET ORAL at 08:53

## 2023-11-21 RX ADMIN — ENOXAPARIN SODIUM 40 MG: 40 INJECTION SUBCUTANEOUS at 08:53

## 2023-11-21 RX ADMIN — SACUBITRIL AND VALSARTAN 1 TABLET: 24; 26 TABLET, FILM COATED ORAL at 21:45

## 2023-11-21 RX ADMIN — SENNOSIDES 17.2 MG: 8.6 TABLET, FILM COATED ORAL at 21:45

## 2023-11-21 RX ADMIN — ATORVASTATIN CALCIUM 80 MG: 80 TABLET, FILM COATED ORAL at 21:45

## 2023-11-21 RX ADMIN — ASPIRIN 81 MG: 81 TABLET, COATED ORAL at 08:53

## 2023-11-21 ASSESSMENT — COGNITIVE AND FUNCTIONAL STATUS - GENERAL
MOBILITY SCORE: 10
MOVING TO AND FROM BED TO CHAIR: A LOT
WALKING IN HOSPITAL ROOM: TOTAL
MOVING FROM LYING ON BACK TO SITTING ON SIDE OF FLAT BED WITH BEDRAILS: A LOT
CLIMB 3 TO 5 STEPS WITH RAILING: TOTAL
STANDING UP FROM CHAIR USING ARMS: A LOT
TURNING FROM BACK TO SIDE WHILE IN FLAT BAD: A LOT

## 2023-11-21 ASSESSMENT — PAIN SCALES - GENERAL
PAINLEVEL_OUTOF10: 0 - NO PAIN

## 2023-11-21 ASSESSMENT — PAIN - FUNCTIONAL ASSESSMENT
PAIN_FUNCTIONAL_ASSESSMENT: 0-10

## 2023-11-21 NOTE — DISCHARGE INSTRUCTIONS
Grief  support  groups are  available  for  free, both in person and  virtual  thru    [facility]    [phone]    [email or web address]

## 2023-11-21 NOTE — PROGRESS NOTES
"Román Marinelli is a 68 y.o. male on day 4 of admission presenting with Acute arterial ischemic stroke, multifocal, mult vascular territories (CMS/HCC).    Subjective       Trying to get off the bed and had a fall today, no new injuries, waiting to go to rehab    Objective     Physical Exam  Alert oriented and 3.  HEENT unremarkable.  Facial droop.  Heart S1-S2.  Lungs clear.  Left-sided weakness.  Last Recorded Vitals  Blood pressure (!) 154/97, pulse 74, temperature 36.8 °C (98.2 °F), temperature source Temporal, resp. rate 18, height 1.753 m (5' 9\"), weight 99.2 kg (218 lb 11.1 oz), SpO2 94 %.  Intake/Output last 3 Shifts:  No intake/output data recorded.    Relevant Results  Results for orders placed or performed during the hospital encounter of 11/17/23 (from the past 96 hour(s))   Lipid Panel   Result Value Ref Range    Cholesterol 220 (H) 0 - 199 mg/dL    HDL-Cholesterol 53.9 mg/dL    Cholesterol/HDL Ratio 4.1     LDL Calculated 138 (H) <=99 mg/dL    VLDL 28 0 - 40 mg/dL    Triglycerides 142 0 - 149 mg/dL    Non HDL Cholesterol 166 (H) 0 - 149 mg/dL   Hemoglobin A1C   Result Value Ref Range    Hemoglobin A1C 5.9 (H) see below %    Estimated Average Glucose 123 Not Established mg/dL   B-Type Natriuretic Peptide   Result Value Ref Range     (H) 0 - 99 pg/mL   Creatine Kinase   Result Value Ref Range    Creatine Kinase 214 0 - 325 U/L   POCT GLUCOSE   Result Value Ref Range    POCT Glucose 119 (H) 74 - 99 mg/dL   Urinalysis with Reflex Microscopic   Result Value Ref Range    Color, Urine Yellow Straw, Yellow    Appearance, Urine Clear Clear    Specific Gravity, Urine 1.032 1.005 - 1.035    pH, Urine 6.0 5.0, 5.5, 6.0, 6.5, 7.0, 7.5, 8.0    Protein, Urine NEGATIVE NEGATIVE mg/dL    Glucose, Urine NEGATIVE NEGATIVE mg/dL    Blood, Urine NEGATIVE NEGATIVE    Ketones, Urine 5 (TRACE) (A) NEGATIVE mg/dL    Bilirubin, Urine NEGATIVE NEGATIVE    Urobilinogen, Urine <2.0 <2.0 mg/dL    Nitrite, Urine NEGATIVE " NEGATIVE    Leukocyte Esterase, Urine TRACE (A) NEGATIVE   Microscopic Only, Urine   Result Value Ref Range    WBC, Urine 6-10 (A) 1-5, NONE /HPF    RBC, Urine NONE NONE, 1-2, 3-5 /HPF   Drug Screen, Urine   Result Value Ref Range    Amphetamine Screen, Urine Presumptive Negative Presumptive Negative    Barbiturate Screen, Urine Presumptive Negative Presumptive Negative    Benzodiazepines Screen, Urine Presumptive Negative Presumptive Negative    Cannabinoid Screen, Urine Presumptive Negative Presumptive Negative    Cocaine Metabolite Screen, Urine Presumptive Negative Presumptive Negative    Fentanyl Screen, Urine Presumptive Negative Presumptive Negative    Opiate Screen, Urine Presumptive Negative Presumptive Negative    Oxycodone Screen, Urine Presumptive Negative Presumptive Negative    PCP Screen, Urine Presumptive Negative Presumptive Negative   CBC and Auto Differential   Result Value Ref Range    WBC 8.9 4.4 - 11.3 x10*3/uL    nRBC 0.0 0.0 - 0.0 /100 WBCs    RBC 5.75 4.50 - 5.90 x10*6/uL    Hemoglobin 15.8 13.5 - 17.5 g/dL    Hematocrit 48.1 41.0 - 52.0 %    MCV 84 80 - 100 fL    MCH 27.5 26.0 - 34.0 pg    MCHC 32.8 32.0 - 36.0 g/dL    RDW 14.1 11.5 - 14.5 %    Platelets 254 150 - 450 x10*3/uL    Neutrophils % 51.8 40.0 - 80.0 %    Immature Granulocytes %, Automated 0.3 0.0 - 0.9 %    Lymphocytes % 36.7 13.0 - 44.0 %    Monocytes % 8.8 2.0 - 10.0 %    Eosinophils % 1.6 0.0 - 6.0 %    Basophils % 0.8 0.0 - 2.0 %    Neutrophils Absolute 4.62 1.20 - 7.70 x10*3/uL    Immature Granulocytes Absolute, Automated 0.03 0.00 - 0.70 x10*3/uL    Lymphocytes Absolute 3.27 1.20 - 4.80 x10*3/uL    Monocytes Absolute 0.78 0.10 - 1.00 x10*3/uL    Eosinophils Absolute 0.14 0.00 - 0.70 x10*3/uL    Basophils Absolute 0.07 0.00 - 0.10 x10*3/uL   Basic Metabolic Panel   Result Value Ref Range    Glucose 108 (H) 74 - 99 mg/dL    Sodium 138 136 - 145 mmol/L    Potassium 3.9 3.5 - 5.3 mmol/L    Chloride 107 98 - 107 mmol/L     Bicarbonate 21 21 - 32 mmol/L    Anion Gap 14 10 - 20 mmol/L    Urea Nitrogen 17 6 - 23 mg/dL    Creatinine 1.33 (H) 0.50 - 1.30 mg/dL    eGFR 59 (L) >60 mL/min/1.73m*2    Calcium 8.7 8.6 - 10.3 mg/dL   SST TOP   Result Value Ref Range    Extra Tube Hold for add-ons.    POCT GLUCOSE   Result Value Ref Range    POCT Glucose 128 (H) 74 - 99 mg/dL   Transthoracic Echo (TTE) Complete   Result Value Ref Range    AV pk mathew 1.10     AV mn grad 3.0     LVOT diam 2.00     LV biplane EF 41     MV avg E/e' ratio 10.03     MV E/A ratio 0.53     LA vol index A/L 26.9     Tricuspid annular plane systolic excursion 2.1     RV free wall pk S' 13.00     LVIDd 5.40     Aortic Valve Area by Continuity of VTI 1.81     Aortic Valve Area by Continuity of Peak Velocity 1.98     AV pk grad 4.8     LV A4C EF 39.2    POCT GLUCOSE   Result Value Ref Range    POCT Glucose 129 (H) 74 - 99 mg/dL   POCT GLUCOSE   Result Value Ref Range    POCT Glucose 127 (H) 74 - 99 mg/dL   POCT GLUCOSE   Result Value Ref Range    POCT Glucose 159 (H) 74 - 99 mg/dL   Magnesium   Result Value Ref Range    Magnesium 2.00 1.60 - 2.40 mg/dL   POCT GLUCOSE   Result Value Ref Range    POCT Glucose 105 (H) 74 - 99 mg/dL   POCT GLUCOSE   Result Value Ref Range    POCT Glucose 101 (H) 74 - 99 mg/dL   CBC   Result Value Ref Range    WBC 7.2 4.4 - 11.3 x10*3/uL    nRBC 0.0 0.0 - 0.0 /100 WBCs    RBC 5.42 4.50 - 5.90 x10*6/uL    Hemoglobin 14.9 13.5 - 17.5 g/dL    Hematocrit 46.3 41.0 - 52.0 %    MCV 85 80 - 100 fL    MCH 27.5 26.0 - 34.0 pg    MCHC 32.2 32.0 - 36.0 g/dL    RDW 14.4 11.5 - 14.5 %    Platelets 254 150 - 450 x10*3/uL   Basic Metabolic Panel   Result Value Ref Range    Glucose 105 (H) 74 - 99 mg/dL    Sodium 137 136 - 145 mmol/L    Potassium 4.0 3.5 - 5.3 mmol/L    Chloride 108 (H) 98 - 107 mmol/L    Bicarbonate 20 (L) 21 - 32 mmol/L    Anion Gap 13 10 - 20 mmol/L    Urea Nitrogen 28 (H) 6 - 23 mg/dL    Creatinine 1.28 0.50 - 1.30 mg/dL    eGFR 62 >60  mL/min/1.73m*2    Calcium 8.0 (L) 8.6 - 10.3 mg/dL   POCT GLUCOSE   Result Value Ref Range    POCT Glucose 110 (H) 74 - 99 mg/dL   CBC   Result Value Ref Range    WBC 7.7 4.4 - 11.3 x10*3/uL    nRBC 0.0 0.0 - 0.0 /100 WBCs    RBC 5.49 4.50 - 5.90 x10*6/uL    Hemoglobin 14.8 13.5 - 17.5 g/dL    Hematocrit 45.9 41.0 - 52.0 %    MCV 84 80 - 100 fL    MCH 27.0 26.0 - 34.0 pg    MCHC 32.2 32.0 - 36.0 g/dL    RDW 14.1 11.5 - 14.5 %    Platelets 245 150 - 450 x10*3/uL   Basic Metabolic Panel   Result Value Ref Range    Glucose 98 74 - 99 mg/dL    Sodium 137 136 - 145 mmol/L    Potassium 3.8 3.5 - 5.3 mmol/L    Chloride 108 (H) 98 - 107 mmol/L    Bicarbonate 21 21 - 32 mmol/L    Anion Gap 12 10 - 20 mmol/L    Urea Nitrogen 24 (H) 6 - 23 mg/dL    Creatinine 1.23 0.50 - 1.30 mg/dL    eGFR 65 >60 mL/min/1.73m*2    Calcium 8.3 (L) 8.6 - 10.3 mg/dL   POCT GLUCOSE   Result Value Ref Range    POCT Glucose 111 (H) 74 - 99 mg/dL   POCT GLUCOSE   Result Value Ref Range    POCT Glucose 109 (H) 74 - 99 mg/dL   ECG 12 lead   Result Value Ref Range    Ventricular Rate 67 BPM    Atrial Rate 67 BPM    LA Interval 190 ms    QRS Duration 112 ms    QT Interval 390 ms    QTC Calculation(Bazett) 412 ms    P Axis 23 degrees    R Axis -15 degrees    T Axis 179 degrees    QRS Count 11 beats    Q Onset 215 ms    P Onset 120 ms    P Offset 181 ms    T Offset 410 ms    QTC Fredericia 404 ms   POCT GLUCOSE   Result Value Ref Range    POCT Glucose 97 74 - 99 mg/dL   POCT GLUCOSE   Result Value Ref Range    POCT Glucose 58 (L) 74 - 99 mg/dL   POCT GLUCOSE   Result Value Ref Range    POCT Glucose 124 (H) 74 - 99 mg/dL   CBC   Result Value Ref Range    WBC 7.6 4.4 - 11.3 x10*3/uL    nRBC 0.0 0.0 - 0.0 /100 WBCs    RBC 5.26 4.50 - 5.90 x10*6/uL    Hemoglobin 14.4 13.5 - 17.5 g/dL    Hematocrit 44.6 41.0 - 52.0 %    MCV 85 80 - 100 fL    MCH 27.4 26.0 - 34.0 pg    MCHC 32.3 32.0 - 36.0 g/dL    RDW 14.0 11.5 - 14.5 %    Platelets 253 150 - 450 x10*3/uL    Basic Metabolic Panel   Result Value Ref Range    Glucose 98 74 - 99 mg/dL    Sodium 137 136 - 145 mmol/L    Potassium 4.2 3.5 - 5.3 mmol/L    Chloride 106 98 - 107 mmol/L    Bicarbonate 23 21 - 32 mmol/L    Anion Gap 12 10 - 20 mmol/L    Urea Nitrogen 24 (H) 6 - 23 mg/dL    Creatinine 1.42 (H) 0.50 - 1.30 mg/dL    eGFR 54 (L) >60 mL/min/1.73m*2    Calcium 8.3 (L) 8.6 - 10.3 mg/dL   POCT GLUCOSE   Result Value Ref Range    POCT Glucose 137 (H) 74 - 99 mg/dL   POCT GLUCOSE   Result Value Ref Range    POCT Glucose 108 (H) 74 - 99 mg/dL        Medications:  aspirin, 81 mg, oral, Daily  atorvastatin, 80 mg, oral, Nightly  clopidogrel, 75 mg, oral, Daily  enoxaparin, 40 mg, subcutaneous, Daily  isosorbide mononitrate ER, 30 mg, oral, Daily  melatonin, 3 mg, oral, Daily  perflutren lipid microspheres, 0.5-10 mL of dilution, intravenous, Once in imaging  perflutren protein A microsphere, 0.5 mL, intravenous, Once in imaging  sacubitriL-valsartan, 1 tablet, oral, BID  sennosides, 2 tablet, oral, BID  sulfur hexafluoride microsphr, 2 mL, intravenous, Once in imaging      PRN medications: acetaminophen **OR** acetaminophen **OR** acetaminophen, acetaminophen **OR** acetaminophen **OR** acetaminophen, hydrALAZINE, magnesium hydroxide, ondansetron **OR** ondansetron, oxygen    Transthoracic Echo (TTE) Complete    Result Date: 11/19/2023   [facility], [address] [place] [address]              Tel [phone] and Fax [phone] TRANSTHORACIC ECHOCARDIOGRAM REPORT  Patient Name:      PRABHAKAR Nath Physician:    [id number] Paulo Jain MD Study Date:        11/18/2023           Ordering Provider:    [id number]                                                               SASHA CALDERON MRN/PID:           [id number]             Fellow: Accession#:         [id number]         Nurse: Date of Birth/Age: 1955 / 68 years Sonographer:          Shelby Rowland RDCS Gender:            M                    Additional Staff: Height:            175.26 cm            Admit Date:           11/17/2023 Weight:            98.43 kg             Admission Status:     Inpatient -                                                               Routine BSA:               2.14 m2              Encounter#:           [id number]                                         Department Location:  [facility] Non                                                               Invasive Blood Pressure: 146 /94 mmHg Study Type:    TRANSTHORACIC ECHO (TTE) COMPLETE Diagnosis/ICD: Other cerebral infarction-I63.89 Indication:    Stroke CPT Code:      Echo Complete w Full Doppler-10210 Patient History: Pertinent History: Chest Pain. Stroke, Acute Coronary Syndrome. Study Detail: The following Echo studies were performed: 2D, Doppler, M-Mode and               color flow. Technically challenging study due to poor acoustic               windows, the patient's lack of cooperation and patient lying in               supine position. Definity used as a contrast agent for endocardial               border definition and agitated saline used as a contrast agent for               intraseptal flow evaluation.  PHYSICIAN INTERPRETATION: Left Ventricle: The left ventricular systolic function is moderately decreased, with an estimated ejection fraction of 35-40%. Wall motion is abnormal. The left ventricular cavity size is normal. Spectral Doppler shows an impaired relaxation pattern of left ventricular diastolic filling. Left Atrium: The left atrium is normal in size. There is no evidence of a patent foramen ovale. A bubble study using agitated saline was performed. Bubble study is negative. Right Ventricle: The right ventricle is normal in size. There is normal right ventricular global systolic function. Right Atrium: The  right atrium is normal in size. Aortic Valve: The aortic valve is trileaflet. There is moderate aortic valve thickening. There is evidence of mild aortic valve stenosis. There is no evidence of aortic valve regurgitation. The peak instantaneous gradient of the aortic valve is 4.8 mmHg. The mean gradient of the aortic valve is 3.0 mmHg. Mitral Valve: The mitral valve is normal in structure. There is trace mitral valve regurgitation. Tricuspid Valve: The tricuspid valve is structurally normal. There is trace tricuspid regurgitation. Pulmonic Valve: The pulmonic valve is not well visualized. The pulmonic valve regurgitation was not well visualized. Pericardium: There is no pericardial effusion noted. Aorta: The aortic root is normal. Systemic Veins: The inferior vena cava appears to be of normal size. In comparison to the previous echocardiogram(s): There are no prior studies on this patient for comparison purposes.  CONCLUSIONS:  1. Left ventricular systolic function is moderately decreased with a 35-40% estimated ejection fraction.  2. Spectral Doppler shows an impaired relaxation pattern of left ventricular diastolic filling.  3. Mild aortic valve stenosis.  4. There is no evidence of a patent foramen ovale. QUANTITATIVE DATA SUMMARY: 2D MEASUREMENTS:                           Normal Ranges: Ao Root d:     3.30 cm    (2.0-3.7cm) LAs:           3.50 cm    (2.7-4.0cm) IVSd:          1.30 cm    (0.6-1.1cm) LVPWd:         0.90 cm    (0.6-1.1cm) LVIDd:         5.40 cm    (3.9-5.9cm) LVIDs:         4.60 cm LV Mass Index: 109.8 g/m2 LV % FS        14.8 % LA VOLUME:                               Normal Ranges: LA Vol A4C:        56.2 ml    (22+/-6mL/m2) LA Vol A2C:        57.5 ml LA Vol BP:         57.5 ml LA Vol Index A4C:  26.3ml/m2 LA Vol Index A2C:  26.9 ml/m2 LA Vol Index BP:   26.9 ml/m2 LA Area A4C:       17.8 cm2 LA Area A2C:       18.2 cm2 LA Major Axis A4C: 4.8 cm LA Major Axis A2C: 4.9 cm LA Volume Index:   26.9  ml/m2 RA VOLUME BY A/L METHOD:                               Normal Ranges: RA Vol A4C:        38.6 ml    (8.3-19.5ml) RA Vol Index A4C:  18.0 ml/m2 RA Area A4C:       14.0 cm2 RA Major Axis A4C: 4.3 cm M-MODE MEASUREMENTS:                  Normal Ranges: Ao Root: 3.10 cm (2.0-3.7cm) LAs:     3.50 cm (2.7-4.0cm) AORTA MEASUREMENTS:                      Normal Ranges: Ao Sinus, d: 3.30 cm (2.1-3.5cm) Ao STJ, d:   2.50 cm (1.7-3.4cm) Asc Ao, d:   3.50 cm (2.1-3.4cm) LV SYSTOLIC FUNCTION BY 2D PLANIMETRY (MOD):                     Normal Ranges: EF-A4C View: 39.2 % (>=55%) EF-A2C View: 40.7 % EF-Biplane:  40.5 % LV DIASTOLIC FUNCTION:                               Normal Ranges: MV Peak E:        0.30 m/s    (0.7-1.2 m/s) MV Peak A:        0.57 m/s    (0.42-0.7 m/s) E/A Ratio:        0.53        (1.0-2.2) MV e'             0.03 m/s    (>8.0) MV lateral e'     0.03 m/s MV medial e'      0.04 m/s MV A Dur:         100.00 msec E/e' Ratio:       10.03       (<8.0) a'                0.05 m/s PulmV Sys Ravi:    38.10 cm/s PulmV [place] Ravi:   20.80 cm/s PulmV S/D Ravi:    1.80 PulmV A Revs Ravi: 25.10 cm/s PulmV A Revs Dur: 106.00 msec MITRAL VALVE:                 Normal Ranges: MV DT: 333 msec (150-240msec) AORTIC VALVE:                                   Normal Ranges: AoV Vmax:                1.10 m/s (<=1.7m/s) AoV Peak P.8 mmHg (<20mmHg) AoV Mean PG:             3.0 mmHg (1.7-11.5mmHg) LVOT Max Ravi:            0.69 m/s (<=1.1m/s) AoV VTI:                 14.90 cm (18-25cm) LVOT VTI:                8.59 cm LVOT Diameter:           2.00 cm  (1.8-2.4cm) AoV Area, VTI:           1.81 cm2 (2.5-5.5cm2) AoV Area,Vmax:           1.98 cm2 (2.5-4.5cm2) AoV Dimensionless Index: 0.58  RIGHT VENTRICLE: RV Basal 3.58 cm RV Mid   2.66 cm RV Major 7.1 cm TAPSE:   20.6 mm RV s'    0.13 m/s TRICUSPID VALVE/RVSP:                            Normal Ranges: Est. RA Pressure: 3 mmHg TV E Vmax:        0.28 m/s (0.3-0.7m/s) TV A Vmax:         0.37 m/s IVC Diam:         1.05 cm PULMONIC VALVE:                         Normal Ranges: PV Accel Time: 63 msec  (>120ms) PV Max Ravi:    0.7 m/s  (0.6-0.9m/s) PV Max P.0 mmHg Pulmonary Veins: PulmV A Revs Dur: 106.00 msec PulmV A Revs Ravi: 25.10 cm/s PulmV [place] Ravi:   20.80 cm/s PulmV S/D Ravi:    1.80 PulmV Sys Ravi:    38.10 cm/s  [id number] Paulo Jain MD Electronically signed on 2023 at 7:42:25 AM  ** Final **     MR brain wo IV contrast    Result Date: 2023  Interpreted By:  Liu Centeno, STUDY: MR BRAIN WO IV CONTRAST;  2023 10:48 pm   INDICATION: Signs/Symptoms:stroke.   COMPARISON: Noncontrast CT head of 2023.   ACCESSION NUMBER(S): [id number]   ORDERING CLINICIAN: SASHA CALDERON   TECHNIQUE: Axial T2, FLAIR, DWI, gradient echo T2 and sagittal and coronal T1 weighted images of brain were acquired.   FINDINGS: Exam is limited by motion artifact.   CSF Spaces: The ventricles, sulci and basal cisterns are within normal limits.   Parenchyma: There is focal reduced diffusion compatible with acute to subacute ischemia in right corona radiata extending into lateral aspect of the lentiform nucleus and medial aspect of external capsule. There is a background of confluence periventricular white matter T2/FLAIR hyperintense signal abnormality extending into subcortical white matter compatible with advanced chronic microvascular ischemia. There are cystic spaces in the basal ganglia, some with associated FLAIR hyperintensity, compatible with combination of prominent perivascular spaces and chronic lacunar infarcts. No evidence of recent hemorrhage. There is no mass effect or midline shift.   Paranasal Sinuses and Mastoids: Visualized paranasal sinuses and mastoid air cells are unremarkable.       There is focal reduced diffusion compatible with acute to subacute ischemia in right corona radiata extending into lateral aspect of the lentiform nucleus and medial aspect  of external capsule. No associated hemorrhage or mass effect.   MACRO: Liu Jacobo discussed the significance and urgency of this critical finding by telephone with  Blayne MontgomeryPrerna on 11/17/2023 at 11:21 pm.  (**-RCF-**) Findings:  See findings.   Signed by: Liu Centeno 11/17/2023 11:22 PM Dictation workstation:   [id number]    CT brain attack head wo IV contrast    Result Date: 11/17/2023  Interpreted By:  Bebeto Yadav and Tavana Shahrzad STUDY: CT ANGIO BRAIN ATTACK NECK W IV CONTRAST AND POST PROCEDURE; CT ANGIO BRAIN ATTACK HEAD W IV CONTRAST AND POST PROCEDURE; CT BRAIN ATTACK HEAD WO IV CONTRAST;  11/17/2023 2:05 pm; 11/17/2023 2:03 pm   INDICATION: Signs/Symptoms:Stroke Evaluation with VAN Positive; Signs/Symptoms:Stroke Evaluation   Left hemiplegia   COMPARISON: None.   ACCESSION NUMBER(S): [id number]; [id number]; [id number]   ORDERING CLINICIAN: REBECCA MOON   TECHNIQUE: Multiple contiguous axial noncontrast images of the head were obtained. Following IV contrast administration of 75 cc Omnipaque 350, a CT angiography of the head and neck was performed. MIPS and 3D reconstructions of the [place] and neck were created on an independent workstation and reviewed.   FINDINGS: NON-CONTRAST HEAD CT:   BRAIN PARENCHYMA: There are confluent areas hypoattenuation within the right cerebral hemisphere, nonspecific, but likely representing chronic microangiopathic changes in this age demographic.. Lacunar infarcts are noted in the basal ganglia bilaterally as well as the right centrum semiovale. No evidence of loss of gray-white matter differentiation. No evidence of acute intraparenchymal hemorrhage or parenchymal evidence of an acute large territory ischemic infarct. No mass-effect, midline shift or effacement of cerebral sulci.   VENTRICLES and EXTRA-AXIAL SPACES:  No acute extra-axial or intraventricular hemorrhage. Ventricles and sulci are age-concordant.Mild ventriculomegaly with  disproportionate enlargement of the sylvian fissures and crowding of the sulci at the vertices, which may be seen in the setting of normal pressure hydrocephalus.   PARANASAL SINUSES/MASTOIDS:  No hemorrhage or air-fluid levels within the visualized paranasal sinuses. The mastoids are well aerated.   CALVARIUM/ORBITS:  No skull fracture.  The orbits and globes are intact to the extent visualized.   EXTRACRANIAL SOFT TISSUES: No discernible abnormality.       CTA NECK:   Two vessel aortic arch, with common origin of the left common carotid and the brachiocephalic trunk. The origins of the arch vessels are widely patent.   LEFT VERTEBRAL ARTERY:  No hemodynamically significant stenosis, occlusion, or dissection.   LEFT COMMON/INTERNAL CAROTID ARTERY: The common carotid artery is patent.  Atherosclerotic calcification noted involving the carotid bulb extending into the proximal internal carotid artery resulting in 0% stenosis by NASCET criteria. Otherwise, no hemodynamically significant stenosis, occlusion, or dissection.   RIGHT VERTEBRAL ARTERY:  No hemodynamically significant stenosis, occlusion, or dissection.   RIGHT COMMON/INTERNAL CAROTID ARTERY: The common carotid artery is patent.  Atherosclerotic calcification noted involving the carotid bulb extending into the proximal internal carotid artery resulting in 0% stenosis by NASCET criteria. Otherwise, no hemodynamically significant stenosis, occlusion, or dissection.     The neck soft tissues show no evidence of mass, fluid collection, or enlarged lymph nodes. There is no acute osseous abnormality. Moderate degenerative changes of the cervical spine noted. Incidental note of an azygous fissure in the right upper lobe. Prominent periapical lucency is noted involving the premolar and molar mandibular teeth on the right.       CTA HEAD:   ANTERIOR CIRCULATION: No aneurysm.   - Internal Carotid Arteries: Atherosclerotic calcification is noted involving the right  lacerum, cavernous, and clinoid right ICA without hemodynamically significant stenosis. Atherosclerotic calcifications noted involving the cavernous and ophthalmic segments of the left ICA resulting in mild narrowing in the mid cavernous segment. Otherwise, no hemodynamically significant stenosis or occlusion.   - Middle Cerebral Arteries:  Mild narrowing of the left M1 segment (series 501, image 626). Possible minimal narrowing of the right M1 segment (series 501, image 639).   - Anterior Cerebral Arteries:  No hemodynamically significant stenosis or occlusion.     POSTERIOR CIRCULATION: No aneurysm.   - Intracranial Vertebral Arteries:  No hemodynamically significant stenosis or occlusion.   - Basilar Artery:  No hemodynamically significant stenosis or occlusion. Mild focal dolichoectasia of the mid basilar artery (series 501, image 606).   - Posterior Cerebral Arteries: Bilateral posterior communicating arteries are opacified. No hemodynamically significant stenosis or occlusion.           1. Multiple well-circumscribed hypodensities within the right right-greater-than-left basal ganglia and deep white matter, likely representing chronic lacunar infarcts. However, superimposed acute component can not be excluded. If there is high clinical suspicion, consider MRI for further evaluation. 2. Mild ventriculomegaly with disproportionate enlargement of the sylvian fissures and crowding of the sulci at the vertices, which may be seen in the setting of normal pressure hydrocephalus. Correlation with clinical exam recommended. 3. Periventricular white matter confluent hypodensities are nonspecific, but likely representing chronic microangiopathic changes in this age demographic. 4. No evidence of source vessel arterial occlusion, flow significant stenosis, dissection, or aneurysm within the head and neck. Mild atherosclerotic changes of the intracranial vasculature as above. 5. Prominent periapical lucency is noted  involving the premolar and molar mandibular teeth on the right, suggestive of periodontal disease. 6. Additional findings as above.   I personally reviewed the images/study and I agree with the findings as stated. This study was interpreted at [place], [place].   MACRO: None   Signed by: Bebeto Yadav 11/17/2023 2:34 PM Dictation workstation:   [id number]    CT angio brain attack head w IV contrast and post procedure    Result Date: 11/17/2023  Interpreted By:  Bebeto Yadav,  Jovanni Mercado STUDY: CT ANGIO BRAIN ATTACK NECK W IV CONTRAST AND POST PROCEDURE; CT ANGIO BRAIN ATTACK HEAD W IV CONTRAST AND POST PROCEDURE; CT BRAIN ATTACK HEAD WO IV CONTRAST;  11/17/2023 2:05 pm; 11/17/2023 2:03 pm   INDICATION: Signs/Symptoms:Stroke Evaluation with VAN Positive; Signs/Symptoms:Stroke Evaluation   Left hemiplegia   COMPARISON: None.   ACCESSION NUMBER(S): [id number]; [id number]; [id number]   ORDERING CLINICIAN: REBECCA MOON   TECHNIQUE: Multiple contiguous axial noncontrast images of the head were obtained. Following IV contrast administration of 75 cc Omnipaque 350, a CT angiography of the head and neck was performed. MIPS and 3D reconstructions of the [place] and neck were created on an independent workstation and reviewed.   FINDINGS: NON-CONTRAST HEAD CT:   BRAIN PARENCHYMA: There are confluent areas hypoattenuation within the right cerebral hemisphere, nonspecific, but likely representing chronic microangiopathic changes in this age demographic.. Lacunar infarcts are noted in the basal ganglia bilaterally as well as the right centrum semiovale. No evidence of loss of gray-white matter differentiation. No evidence of acute intraparenchymal hemorrhage or parenchymal evidence of an acute large territory ischemic infarct. No mass-effect, midline shift or effacement of cerebral sulci.   VENTRICLES and EXTRA-AXIAL SPACES:  No acute extra-axial or  intraventricular hemorrhage. Ventricles and sulci are age-concordant.Mild ventriculomegaly with disproportionate enlargement of the sylvian fissures and crowding of the sulci at the vertices, which may be seen in the setting of normal pressure hydrocephalus.   PARANASAL SINUSES/MASTOIDS:  No hemorrhage or air-fluid levels within the visualized paranasal sinuses. The mastoids are well aerated.   CALVARIUM/ORBITS:  No skull fracture.  The orbits and globes are intact to the extent visualized.   EXTRACRANIAL SOFT TISSUES: No discernible abnormality.       CTA NECK:   Two vessel aortic arch, with common origin of the left common carotid and the brachiocephalic trunk. The origins of the arch vessels are widely patent.   LEFT VERTEBRAL ARTERY:  No hemodynamically significant stenosis, occlusion, or dissection.   LEFT COMMON/INTERNAL CAROTID ARTERY: The common carotid artery is patent.  Atherosclerotic calcification noted involving the carotid bulb extending into the proximal internal carotid artery resulting in 0% stenosis by NASCET criteria. Otherwise, no hemodynamically significant stenosis, occlusion, or dissection.   RIGHT VERTEBRAL ARTERY:  No hemodynamically significant stenosis, occlusion, or dissection.   RIGHT COMMON/INTERNAL CAROTID ARTERY: The common carotid artery is patent.  Atherosclerotic calcification noted involving the carotid bulb extending into the proximal internal carotid artery resulting in 0% stenosis by NASCET criteria. Otherwise, no hemodynamically significant stenosis, occlusion, or dissection.     The neck soft tissues show no evidence of mass, fluid collection, or enlarged lymph nodes. There is no acute osseous abnormality. Moderate degenerative changes of the cervical spine noted. Incidental note of an azygous fissure in the right upper lobe. Prominent periapical lucency is noted involving the premolar and molar mandibular teeth on the right.       CTA HEAD:   ANTERIOR CIRCULATION: No  aneurysm.   - Internal Carotid Arteries: Atherosclerotic calcification is noted involving the right lacerum, cavernous, and clinoid right ICA without hemodynamically significant stenosis. Atherosclerotic calcifications noted involving the cavernous and ophthalmic segments of the left ICA resulting in mild narrowing in the mid cavernous segment. Otherwise, no hemodynamically significant stenosis or occlusion.   - Middle Cerebral Arteries:  Mild narrowing of the left M1 segment (series 501, image 626). Possible minimal narrowing of the right M1 segment (series 501, image 639).   - Anterior Cerebral Arteries:  No hemodynamically significant stenosis or occlusion.     POSTERIOR CIRCULATION: No aneurysm.   - Intracranial Vertebral Arteries:  No hemodynamically significant stenosis or occlusion.   - Basilar Artery:  No hemodynamically significant stenosis or occlusion. Mild focal dolichoectasia of the mid basilar artery (series 501, image 606).   - Posterior Cerebral Arteries: Bilateral posterior communicating arteries are opacified. No hemodynamically significant stenosis or occlusion.           1. Multiple well-circumscribed hypodensities within the right right-greater-than-left basal ganglia and deep white matter, likely representing chronic lacunar infarcts. However, superimposed acute component can not be excluded. If there is high clinical suspicion, consider MRI for further evaluation. 2. Mild ventriculomegaly with disproportionate enlargement of the sylvian fissures and crowding of the sulci at the vertices, which may be seen in the setting of normal pressure hydrocephalus. Correlation with clinical exam recommended. 3. Periventricular white matter confluent hypodensities are nonspecific, but likely representing chronic microangiopathic changes in this age demographic. 4. No evidence of source vessel arterial occlusion, flow significant stenosis, dissection, or aneurysm within the head and neck. Mild  atherosclerotic changes of the intracranial vasculature as above. 5. Prominent periapical lucency is noted involving the premolar and molar mandibular teeth on the right, suggestive of periodontal disease. 6. Additional findings as above.   I personally reviewed the images/study and I agree with the findings as stated. This study was interpreted at [place], [place].   MACRO: None   Signed by: Bebeto Yadav 11/17/2023 2:34 PM Dictation workstation:   [id number]    CT angio brain attack neck w IV contrast and post procedure    Result Date: 11/17/2023  Interpreted By:  Bebeto Yadav and Tavana Shahrzad STUDY: CT ANGIO BRAIN ATTACK NECK W IV CONTRAST AND POST PROCEDURE; CT ANGIO BRAIN ATTACK HEAD W IV CONTRAST AND POST PROCEDURE; CT BRAIN ATTACK HEAD WO IV CONTRAST;  11/17/2023 2:05 pm; 11/17/2023 2:03 pm   INDICATION: Signs/Symptoms:Stroke Evaluation with VAN Positive; Signs/Symptoms:Stroke Evaluation   Left hemiplegia   COMPARISON: None.   ACCESSION NUMBER(S): [id number]; [id number]; [id number]   ORDERING CLINICIAN: REBECCA MOON   TECHNIQUE: Multiple contiguous axial noncontrast images of the head were obtained. Following IV contrast administration of 75 cc Omnipaque 350, a CT angiography of the head and neck was performed. MIPS and 3D reconstructions of the [place] and neck were created on an independent workstation and reviewed.   FINDINGS: NON-CONTRAST HEAD CT:   BRAIN PARENCHYMA: There are confluent areas hypoattenuation within the right cerebral hemisphere, nonspecific, but likely representing chronic microangiopathic changes in this age demographic.. Lacunar infarcts are noted in the basal ganglia bilaterally as well as the right centrum semiovale. No evidence of loss of gray-white matter differentiation. No evidence of acute intraparenchymal hemorrhage or parenchymal evidence of an acute large territory ischemic infarct. No mass-effect, midline  shift or effacement of cerebral sulci.   VENTRICLES and EXTRA-AXIAL SPACES:  No acute extra-axial or intraventricular hemorrhage. Ventricles and sulci are age-concordant.Mild ventriculomegaly with disproportionate enlargement of the sylvian fissures and crowding of the sulci at the vertices, which may be seen in the setting of normal pressure hydrocephalus.   PARANASAL SINUSES/MASTOIDS:  No hemorrhage or air-fluid levels within the visualized paranasal sinuses. The mastoids are well aerated.   CALVARIUM/ORBITS:  No skull fracture.  The orbits and globes are intact to the extent visualized.   EXTRACRANIAL SOFT TISSUES: No discernible abnormality.       CTA NECK:   Two vessel aortic arch, with common origin of the left common carotid and the brachiocephalic trunk. The origins of the arch vessels are widely patent.   LEFT VERTEBRAL ARTERY:  No hemodynamically significant stenosis, occlusion, or dissection.   LEFT COMMON/INTERNAL CAROTID ARTERY: The common carotid artery is patent.  Atherosclerotic calcification noted involving the carotid bulb extending into the proximal internal carotid artery resulting in 0% stenosis by NASCET criteria. Otherwise, no hemodynamically significant stenosis, occlusion, or dissection.   RIGHT VERTEBRAL ARTERY:  No hemodynamically significant stenosis, occlusion, or dissection.   RIGHT COMMON/INTERNAL CAROTID ARTERY: The common carotid artery is patent.  Atherosclerotic calcification noted involving the carotid bulb extending into the proximal internal carotid artery resulting in 0% stenosis by NASCET criteria. Otherwise, no hemodynamically significant stenosis, occlusion, or dissection.     The neck soft tissues show no evidence of mass, fluid collection, or enlarged lymph nodes. There is no acute osseous abnormality. Moderate degenerative changes of the cervical spine noted. Incidental note of an azygous fissure in the right upper lobe. Prominent periapical lucency is noted involving the  premolar and molar mandibular teeth on the right.       CTA HEAD:   ANTERIOR CIRCULATION: No aneurysm.   - Internal Carotid Arteries: Atherosclerotic calcification is noted involving the right lacerum, cavernous, and clinoid right ICA without hemodynamically significant stenosis. Atherosclerotic calcifications noted involving the cavernous and ophthalmic segments of the left ICA resulting in mild narrowing in the mid cavernous segment. Otherwise, no hemodynamically significant stenosis or occlusion.   - Middle Cerebral Arteries:  Mild narrowing of the left M1 segment (series 501, image 626). Possible minimal narrowing of the right M1 segment (series 501, image 639).   - Anterior Cerebral Arteries:  No hemodynamically significant stenosis or occlusion.     POSTERIOR CIRCULATION: No aneurysm.   - Intracranial Vertebral Arteries:  No hemodynamically significant stenosis or occlusion.   - Basilar Artery:  No hemodynamically significant stenosis or occlusion. Mild focal dolichoectasia of the mid basilar artery (series 501, image 606).   - Posterior Cerebral Arteries: Bilateral posterior communicating arteries are opacified. No hemodynamically significant stenosis or occlusion.           1. Multiple well-circumscribed hypodensities within the right right-greater-than-left basal ganglia and deep white matter, likely representing chronic lacunar infarcts. However, superimposed acute component can not be excluded. If there is high clinical suspicion, consider MRI for further evaluation. 2. Mild ventriculomegaly with disproportionate enlargement of the sylvian fissures and crowding of the sulci at the vertices, which may be seen in the setting of normal pressure hydrocephalus. Correlation with clinical exam recommended. 3. Periventricular white matter confluent hypodensities are nonspecific, but likely representing chronic microangiopathic changes in this age demographic. 4. No evidence of source vessel arterial occlusion,  flow significant stenosis, dissection, or aneurysm within the head and neck. Mild atherosclerotic changes of the intracranial vasculature as above. 5. Prominent periapical lucency is noted involving the premolar and molar mandibular teeth on the right, suggestive of periodontal disease. 6. Additional findings as above.   I personally reviewed the images/study and I agree with the findings as stated. This study was interpreted at [facility], [place], [place].   MACRO: None   Signed by: Bebeto Yadav 11/17/2023 2:34 PM Dictation workstation:   [id number]      Assessment/Plan      62-year-old -American male, who is known to history of hypertension, history of cocaine use, hyperlipidemia, he came to the emergency department, he was sent to the emergency department as ambulance was called that he fell and he was unable to get up and he had dense left-sided weakness.  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  Hypertension blood pressure is acceptable.  PT OT consult.  Echocardiogram his ejection fraction is only 30%, we will also get a cardiology consult,.  Added Entresto,  Discharge plans to rehab as he knows that he is unable to walk or transfer by himself.        I spent 25 minutes in the professional and overall care of this patient.    Brandi Titus MD

## 2023-11-21 NOTE — PROGRESS NOTES
Speech-Language Pathology    SLP ADULT Inpatient Cognitive Evaluation      Patient Name: Román Marinelli  MRN: [id number]  : 1955  Today's Date: 23   Time Calculation  Start Time: 1325  Stop Time: 1355  Time Calculation (min): 30 min          Recommendations: SLP services for cognitive-linguistic therapy at next level of care.    Results: Patient presents with cognitive linguistic deficits in the areas of thought organization, reasoning, and problem solving. Deficit in functional calculation tasks noted. Reduced attention and concentration to task noted with perseverative responses along with decreased safety awareness as exhibited by recent fall this am. Patient responds well to cueing with correct responses elicited with repetition of stimulus.    Attention/Concentration:   To task: (fair)   Counts backwards from 20 by 2's: (DNT)    Time Concepts:    Days in a Week: 100%   Months in a Year: 100%   Hours in a Day: 100%   Days in a month: 100%    Recall: DNT     Thought Organization:   List Category Members: [place] (100%); Abstract (2 max items with perseveration)   Names Categories Given Members: x100%   Sequences Related Words: 100%   Sequences Tasks: DNT    Inductive Reasoning:   Antonyms: 100%   Synonyms: 60%   Cause/Effect Relationships: 100%   Similarities/Differences of Objects: 100%   Analogies: 100%   Inferences: 100%    Deductive Reasoning:   Homonyms: 50%   Proverbial Expressions: 100%   Absurdities in Sentences: 100%   Problem Solving Given Scenario: 100%    Functional Math Problem Solving:   Temporal and Monetary Units: 75% (visual cue provided.    General Information:  Patient Admitted: 23  PMHX: HTN, cocaine use, HLD. Admitted following fall with L sided weakness. Imaging revealed a R corona radiata stroke extending to lentiform nucleus.      Plan:   SLP to follow for cognitive linguistic therapy    Goals:   Patient will generate 7 items of concrete category with min  cueing.  Patient will define homonyms Ind with 90% accuracy with no cues.  Patient will complete calculation tasks for temporal and monetary units Ind with 90% accuracy.    Inpatient Education   Patient educated on role of SLP   Patient education results gave verbal understanding

## 2023-11-21 NOTE — PROGRESS NOTES
Physical Therapy    Physical Therapy Treatment    Patient Name: Román Marinelli  MRN: [id number]  Today's Date: 11/21/2023  Time Calculation  Start Time: 1256  Stop Time: 1324  Time Calculation (min): 28 min       Assessment/Plan   PT Assessment  PT Assessment Results: Decreased strength, Decreased endurance, Impaired balance, Decreased mobility, Decreased safety awareness, Impaired judgement  Rehab Prognosis: Good  Evaluation/Treatment Tolerance: Patient limited by fatigue  Medical Staff Made Aware: Yes  Strengths: Premorbid level of function  Barriers to Participation: Comorbidities  End of Session Communication: Bedside nurse  Assessment Comment: Pt presents today with decreased assist required for bed mobility. Pt was also able to tolerate BLE exercise at EOB this date. Pt continues to have L sided weakness compared to the right. Continued PT during the current admission would benefit the pt to progress strength, balance, and decrease assist required with mobility.  End of Session Patient Position: Bed, 3 rail up, On cart  PT Plan  Inpatient/Swing Bed or Outpatient: Inpatient  PT Plan  Treatment/Interventions: Bed mobility, Transfer training, Gait training, Balance training, Stair training, Neuromuscular re-education, Strengthening, Endurance training, Therapeutic exercise, Therapeutic activity, Home exercise program, Positioning  PT Plan: Skilled PT  PT Frequency: 4 times per week  PT Discharge Recommendations: High intensity level of continued care  Equipment Recommended upon Discharge:  (BD in future sessions)  PT Recommended Transfer Status: Assist x2  PT - OK to Discharge: Yes (Per POC)      General Visit Information:   PT  Visit  PT Received On: 11/21/23  General  Reason for Referral: Stroke  Referred By: BRANDON Escobar  Family/Caregiver Present: No  Prior to Session Communication: Bedside nurse  Patient Position Received: Bed, 3 rail up  Preferred Learning Style: verbal, visual, kinesthetic  General Comment: Pt  supine in bed upon PT arrival. Cleared to participate with RN, and agreeable to PT treatment    Subjective   Precautions:  Precautions  Medical Precautions: Fall precautions  Vital Signs:  Vital Signs  Heart Rate: 88 (92 in EOB sitting. 91 when returned to supine after BLE exercise)  Heart Rate Source: Monitor  BP: 157/78 (Increased to 180/113 in EOB sitting. 156/93 when returned to supine)  BP Location: Left arm  BP Method: Automatic  Patient Position: Lying    Objective   Pain:  Pain Assessment  Pain Assessment: 0-10  Pain Location: Chest  Pain Orientation: Left  Pain Frequency: Intermittent    Postural Control:  Postural Control  Righting Reactions: Pt able to correct LOB to right with RUE in EOB sitting  Posture Comment: Pt leaning right in static sitting at EOB for support on RUE  Extremity/Trunk Assessments:  RLE   RLE : Exceptions to WFL  Strength RLE  R Hip Flexion: 4-/5  R Knee Extension: 4/5  R Ankle Dorsiflexion: 4-/5  R Ankle Plantar Flexion: 3+/5  LLE   LLE : Exceptions to WFL  Strength LLE  L Hip Flexion: 2-/5  L Hip ABduction: 2/5  L Hip ADduction: 2+/5  L Knee Flexion: 2-/5  L Knee Extension: 3-/5  L Ankle Dorsiflexion: 2+/5  L Ankle Plantar Flexion: 2+/5  Activity Tolerance:  Activity Tolerance  Endurance: Tolerates 10 - 20 min exercise with multiple rests  Treatments:  Therapeutic Exercise  Therapeutic Exercise Performed: Yes  Therapeutic Exercise Activity 1: Pt performed sesated marches, LAQ, AP, HS, resisted hip ab/adduction 1x5 bilaterally. QS were performed supine in bed. Assist was provided to LLE in all exercises for correction of technique and to achieve full ROM.    Bed Mobility  Bed Mobility: Yes  Bed Mobility 1  Bed Mobility 1: Supine to sitting  Level of Assistance 1: Moderate assistance, Minimal verbal cues  Bed Mobility Comments 1: HOB elevated. VC for BLE progression to EOB. Assist with L hand grab on bed rail and trunk into upright sitting.  Bed Mobility 2  Bed Mobility  2: Sitting  to supine  Level of Assistance 2: Moderate assistance  Bed Mobility Comments 2: Assist with LLE lift into bed. Fair trunk control on descent to bed. HOB elevated.  Bed Mobility 3  Bed Mobility 3: Scooting  Level of Assistance 3: Moderate assistance, Minimal verbal cues  Bed Mobility Comments 3: VC for R knee flexion and UE push on bed. HOB flat. Pt able to assist in scooting toward HOB.    Ambulation/Gait Training  Ambulation/Gait Training Performed: No  Transfers  Transfer: No  Transfer 1  Transfer From 1: Sit to  Transfer to 1: Stand  Technique 1: Sit to stand  Transfer Device 1: Gait belt  Transfer Level of Assistance 1: Arm in arm assistance, Moderate assistance, +2, Minimal verbal cues, Minimal tactile cues  Trials/Comments 1: x2 trials. Intial forward flexion at trunk in standing. Pt able to achieve more upright posture with VC/TC. Dizziness reported in standing.  Transfers 2  Transfer From 2: Stand to  Transfer to 2: Sit  Technique 2: Stand to sit  Transfer Level of Assistance 2: Arm in arm assistance, Moderate assistance, +2, Minimal verbal cues  Trials/Comments 2: x2 trials. VC for UE reach without return demonstration. Assist to control descent to sitting surface    Outcome Measures:  [facility] Basic Mobility  Turning from your back to your side while in a flat bed without using bedrails: A lot  Moving from lying on your back to sitting on the side of a flat bed without using bedrails: A lot  Moving to and from bed to chair (including a wheelchair): A lot  Standing up from a chair using your arms (e.g. wheelchair or bedside chair): A lot  To walk in hospital room: Total  Climbing 3-5 steps with railing: Total  Basic Mobility - Total Score: 10    Education Documentation  Home Exercise Program, taught by Darwin Jeter PT at 11/21/2023  2:42 PM.  Learner: Patient  Readiness: Acceptance  Method: Explanation, Demonstration  Response: Verbalizes Understanding, Demonstrated Understanding    Body Mechanics, taught  by Darwin Jeter, PT at 11/21/2023  2:42 PM.  Learner: Patient  Readiness: Acceptance  Method: Explanation, Demonstration  Response: Verbalizes Understanding, Demonstrated Understanding    Mobility Training, taught by Darwin Jeter, PT at 11/21/2023  2:42 PM.  Learner: Patient  Readiness: Acceptance  Method: Explanation, Demonstration  Response: Verbalizes Understanding, Demonstrated Understanding    Education Comments  No comments found.        OP EDUCATION:       Encounter Problems       Encounter Problems (Active)       Balance       Goal 1 (Progressing)       Start:  11/18/23    Expected End:  12/02/23       Pt performs all sitting balance with mod assist x 1 and standing balance with mod assist x 2 with LRAD            Mobility       STG - Patient will ambulate (Not Progressing)       Start:  11/18/23    Expected End:  12/02/23       >15 ft with mod assist x 2 and LRAD            PT Problem       PT Goal 1 (Progressing)       Start:  11/18/23    Expected End:  12/02/23       Pt demonstrates improvement of at least 1 MMT grade in all BLE MMT tested during initial PT evaluation            Pain - Adult          Transfers       STG - Patient to transfer to and from sit to supine (Met)       Start:  11/18/23    Expected End:  12/02/23    Resolved:  11/21/23    With mod assist x 1         STG - Patient will transfer sit to and from stand (Not Progressing)       Start:  11/18/23    Expected End:  12/02/23       With mod assist x 2 and LRAD

## 2023-11-21 NOTE — PROGRESS NOTES
"Subjective Data:  Sitting in bed  Denies chest pain or shortness of breath    Tele:  SR 90 with PVC    Overnight Events:    Reported fall last night, found by RN on floor     Objective Data:  Last Recorded Vitals:  Vitals:    23 0727 23 0800 23 0835 23 1212   BP: (!) 186/102  133/73 175/67   BP Location: Right arm  Left arm Left arm   Patient Position: Lying  Lying Lying   Pulse: 91  82 62   Resp:    Temp: 36.2 °C (97.1 °F)  36.6 °C (97.9 °F) 36.6 °C (97.8 °F)   TempSrc: Oral  Temporal Temporal   SpO2:  98% 94% 95%   Weight:       Height:         Medical Gas Therapy: None (Room air)  Weight  Av.6 kg (217 lb 6.4 oz)  Min: 98.3 kg (216 lb 12.8 oz)  Max: 99.2 kg (218 lb 11.1 oz)    LABS:  CMP:  Results from last 7 days   Lab Units 23  0514 23  0529 23  1145 23  2330 23  0613 23  1407   SODIUM mmol/L 137 137 137  --  138 136   POTASSIUM mmol/L 4.2 3.8 4.0  --  3.9 2.1*   CHLORIDE mmol/L 106 108* 108*  --  107 115*   CO2 mmol/L 23 21 20*  --  21 16*   ANION GAP mmol/L 12 12 13  --  14 7*   BUN mg/dL 24* 24* 28*  --  17 7   CREATININE mg/dL 1.42* 1.23 1.28  --  1.33* 0.44*   EGFR mL/min/1.73m*2 54* 65 62  --  59* >90   MAGNESIUM mg/dL  --   --   --  2.00  --   --    ALBUMIN g/dL  --   --   --   --   --  2.0*   ALT U/L  --   --   --   --   --  7*   AST U/L  --   --   --   --   --  8*   BILIRUBIN TOTAL mg/dL  --   --   --   --   --  0.5     CBC:  Results from last 7 days   Lab Units 23  0514 23  0529 23  1145 23  0612 23  1407   WBC AUTO x10*3/uL 7.6 7.7 7.2 8.9 8.2   HEMOGLOBIN g/dL 14.4 14.8 14.9 15.8 14.4   HEMATOCRIT % 44.6 45.9 46.3 48.1 44.5   PLATELETS AUTO x10*3/uL 253 245 254 254 240   MCV fL 85 84 85 84 83     COAG:   Results from last 7 days   Lab Units 23  1407   INR  1.3*     ABO: No results found for: \"ABO\"  HEME/ENDO:  Results from last 7 days   Lab Units 23  1831   HEMOGLOBIN A1C % 5.9*    "   CARDIAC:   Results from last 7 days   Lab Units 11/17/23  1831 11/17/23  1407   TROPHS ng/L  --  19   BNP pg/mL 202*  --       Results from last 7 days   Lab Units 11/17/23  1831   HEMOGLOBIN A1C % 5.9*   LDL CALC mg/dL 138*   VLDL mg/dL 28   CHOLESTEROL/HDL RATIO  4.1        Last I/O:  No intake or output data in the 24 hours ending 11/21/23 1320  Net IO Since Admission: -1,730 mL [11/21/23 1320]      Imaging Results:  Transthoracic Echo (TTE) Complete    Result Date: 11/19/2023   [facility], [address] [place] [address]              Tel [phone] and Fax [phone] TRANSTHORACIC ECHOCARDIOGRAM REPORT  Patient Name:      PRABHAKAR Nath Physician:    [id number] Paulo Jain MD Study Date:        11/18/2023           Ordering Provider:    [id number]                                                               SASHA CALDERON MRN/PID:           [id number]             Fellow: Accession#:        [id number]         Nurse: Date of Birth/Age: 1955 / 68 years Sonographer:          Shelby Rowland RDCS Gender:            M                    Additional Staff: Height:            175.26 cm            Admit Date:           11/17/2023 Weight:            98.43 kg             Admission Status:     Inpatient -                                                               Routine BSA:               2.14 m2              Encounter#:           [id number]                                         Department Location:  [facility] Non                                                               Invasive Blood Pressure: 146 /94 mmHg Study Type:    TRANSTHORACIC ECHO (TTE) COMPLETE Diagnosis/ICD: Other cerebral infarction-I63.89 Indication:    Stroke CPT Code:      Echo Complete w Full [id number] Patient History: Pertinent History: Chest Pain. Stroke, Acute Coronary  Syndrome. Study Detail: The following Echo studies were performed: 2D, Doppler, M-Mode and               color flow. Technically challenging study due to poor acoustic               windows, the patient's lack of cooperation and patient lying in               supine position. Definity used as a contrast agent for endocardial               border definition and agitated saline used as a contrast agent for               intraseptal flow evaluation.  PHYSICIAN INTERPRETATION: Left Ventricle: The left ventricular systolic function is moderately decreased, with an estimated ejection fraction of 35-40%. Wall motion is abnormal. The left ventricular cavity size is normal. Spectral Doppler shows an impaired relaxation pattern of left ventricular diastolic filling. Left Atrium: The left atrium is normal in size. There is no evidence of a patent foramen ovale. A bubble study using agitated saline was performed. Bubble study is negative. Right Ventricle: The right ventricle is normal in size. There is normal right ventricular global systolic function. Right Atrium: The right atrium is normal in size. Aortic Valve: The aortic valve is trileaflet. There is moderate aortic valve thickening. There is evidence of mild aortic valve stenosis. There is no evidence of aortic valve regurgitation. The peak instantaneous gradient of the aortic valve is 4.8 mmHg. The mean gradient of the aortic valve is 3.0 mmHg. Mitral Valve: The mitral valve is normal in structure. There is trace mitral valve regurgitation. Tricuspid Valve: The tricuspid valve is structurally normal. There is trace tricuspid regurgitation. Pulmonic Valve: The pulmonic valve is not well visualized. The pulmonic valve regurgitation was not well visualized. Pericardium: There is no pericardial effusion noted. Aorta: The aortic root is normal. Systemic Veins: The inferior vena cava appears to be of normal size. In comparison to the previous echocardiogram(s): There are no  prior studies on this patient for comparison purposes.  CONCLUSIONS:  1. Left ventricular systolic function is moderately decreased with a 35-40% estimated ejection fraction.  2. Spectral Doppler shows an impaired relaxation pattern of left ventricular diastolic filling.  3. Mild aortic valve stenosis.  4. There is no evidence of a patent foramen ovale. QUANTITATIVE DATA SUMMARY: 2D MEASUREMENTS:                           Normal Ranges: Ao Root d:     3.30 cm    (2.0-3.7cm) LAs:           3.50 cm    (2.7-4.0cm) IVSd:          1.30 cm    (0.6-1.1cm) LVPWd:         0.90 cm    (0.6-1.1cm) LVIDd:         5.40 cm    (3.9-5.9cm) LVIDs:         4.60 cm LV Mass Index: 109.8 g/m2 LV % FS        14.8 % LA VOLUME:                               Normal Ranges: LA Vol A4C:        56.2 ml    (22+/-6mL/m2) LA Vol A2C:        57.5 ml LA Vol BP:         57.5 ml LA Vol Index A4C:  26.3ml/m2 LA Vol Index A2C:  26.9 ml/m2 LA Vol Index BP:   26.9 ml/m2 LA Area A4C:       17.8 cm2 LA Area A2C:       18.2 cm2 LA Major Axis A4C: 4.8 cm LA Major Axis A2C: 4.9 cm LA Volume Index:   26.9 ml/m2 RA VOLUME BY A/L METHOD:                               Normal Ranges: RA Vol A4C:        38.6 ml    (8.3-19.5ml) RA Vol Index A4C:  18.0 ml/m2 RA Area A4C:       14.0 cm2 RA Major Axis A4C: 4.3 cm M-MODE MEASUREMENTS:                  Normal Ranges: Ao Root: 3.10 cm (2.0-3.7cm) LAs:     3.50 cm (2.7-4.0cm) AORTA MEASUREMENTS:                      Normal Ranges: Ao Sinus, d: 3.30 cm (2.1-3.5cm) Ao STJ, d:   2.50 cm (1.7-3.4cm) Asc Ao, d:   3.50 cm (2.1-3.4cm) LV SYSTOLIC FUNCTION BY 2D PLANIMETRY (MOD):                     Normal Ranges: EF-A4C View: 39.2 % (>=55%) EF-A2C View: 40.7 % EF-Biplane:  40.5 % LV DIASTOLIC FUNCTION:                               Normal Ranges: MV Peak E:        0.30 m/s    (0.7-1.2 m/s) MV Peak A:        0.57 m/s    (0.42-0.7 m/s) E/A Ratio:        0.53        (1.0-2.2) MV e'             0.03 m/s    (>8.0) MV lateral e'      0.03 m/s MV medial e'      0.04 m/s MV A Dur:         100.00 msec E/e' Ratio:       10.03       (<8.0) a'                0.05 m/s PulmV Sys Ravi:    38.10 cm/s PulmV [place] Ravi:   20.80 cm/s PulmV S/D Ravi:    1.80 PulmV A Revs Ravi: 25.10 cm/s PulmV A Revs Dur: 106.00 msec MITRAL VALVE:                 Normal Ranges: MV DT: 333 msec (150-240msec) AORTIC VALVE:                                   Normal Ranges: AoV Vmax:                1.10 m/s (<=1.7m/s) AoV Peak P.8 mmHg (<20mmHg) AoV Mean PG:             3.0 mmHg (1.7-11.5mmHg) LVOT Max Ravi:            0.69 m/s (<=1.1m/s) AoV VTI:                 14.90 cm (18-25cm) LVOT VTI:                8.59 cm LVOT Diameter:           2.00 cm  (1.8-2.4cm) AoV Area, VTI:           1.81 cm2 (2.5-5.5cm2) AoV Area,Vmax:           1.98 cm2 (2.5-4.5cm2) AoV Dimensionless Index: 0.58  RIGHT VENTRICLE: RV Basal 3.58 cm RV Mid   2.66 cm RV Major 7.1 cm TAPSE:   20.6 mm RV s'    0.13 m/s TRICUSPID VALVE/RVSP:                            Normal Ranges: Est. RA Pressure: 3 mmHg TV E Vmax:        0.28 m/s (0.3-0.7m/s) TV A Vmax:        0.37 m/s IVC Diam:         1.05 cm PULMONIC VALVE:                         Normal Ranges: PV Accel Time: 63 msec  (>120ms) PV Max Ravi:    0.7 m/s  (0.6-0.9m/s) PV Max P.0 mmHg Pulmonary Veins: PulmV A Revs Dur: 106.00 msec PulmV A Revs Ravi: 25.10 cm/s PulmV [place] Ravi:   20.80 cm/s PulmV S/D Ravi:    1.80 PulmV Sys Ravi:    38.10 cm/s  [id number] Paulo Jain MD Electronically signed on 2023 at 7:42:25 AM  ** Final **         Past Cardiology Tests (Last 3 Years):  EKG:  Results for orders placed during the hospital encounter of 23    ECG 12 lead    Narrative  Sinus rhythm with sinus arrhythmia with Fusion complexes  Inferior infarct (cited on or before 2023)  T wave abnormality, consider lateral ischemia  Abnormal ECG  Confirmed by Spencer Hart ([id number]) on 2023 3:39:09 PM    Echo:  Results for orders placed during  the hospital encounter of 11/17/23    Transthoracic Echo (TTE) Complete    Narrative  [facility], [address] [place] [address]  Tel [phone] and Fax [phone]    TRANSTHORACIC ECHOCARDIOGRAM REPORT      Patient Name:      PRABHAKAR LEWIS      Reading Physician:    [id number] Paulo Jain MD  Study Date:        11/18/2023           Ordering Provider:    [id number]  SASHA CALDERON  MRN/PID:           [id number]             Fellow:  Accession#:        [id number]         Nurse:  Date of Birth/Age: 1955 / 68 years Sonographer:          Shelby Rowland RDCS  Gender:            M                    Additional Staff:  Height:            175.26 cm            Admit Date:           11/17/2023  Weight:            98.43 kg             Admission Status:     Inpatient -  Routine  BSA:               2.14 m2              Encounter#:           [id number]  Department Location:  [facility] Non  Invasive  Blood Pressure: 146 /94 mmHg    Study Type:    TRANSTHORACIC ECHO (TTE) COMPLETE  Diagnosis/ICD: Other cerebral infarction-I63.89  Indication:    Stroke  CPT Code:      Echo Complete w Full Doppler-[id number]    Patient History:  Pertinent History: Chest Pain. Stroke, Acute Coronary Syndrome.    Study Detail: The following Echo studies were performed: 2D, Doppler, M-Mode and  color flow. Technically challenging study due to poor acoustic  windows, the patient's lack of cooperation and patient lying in  supine position. Definity used as a contrast agent for endocardial  border definition and agitated saline used as a contrast agent for  intraseptal flow evaluation.      PHYSICIAN INTERPRETATION:  Left Ventricle: The left ventricular systolic function is moderately decreased, with an estimated ejection fraction of 35-40%. Wall motion is abnormal. The left ventricular cavity size is normal. Spectral Doppler shows an impaired relaxation pattern of left ventricular diastolic filling.  Left Atrium: The left  atrium is normal in size. There is no evidence of a patent foramen ovale. A bubble study using agitated saline was performed. Bubble study is negative.  Right Ventricle: The right ventricle is normal in size. There is normal right ventricular global systolic function.  Right Atrium: The right atrium is normal in size.  Aortic Valve: The aortic valve is trileaflet. There is moderate aortic valve thickening. There is evidence of mild aortic valve stenosis.  There is no evidence of aortic valve regurgitation. The peak instantaneous gradient of the aortic valve is 4.8 mmHg. The mean gradient of the aortic valve is 3.0 mmHg.  Mitral Valve: The mitral valve is normal in structure. There is trace mitral valve regurgitation.  Tricuspid Valve: The tricuspid valve is structurally normal. There is trace tricuspid regurgitation.  Pulmonic Valve: The pulmonic valve is not well visualized. The pulmonic valve regurgitation was not well visualized.  Pericardium: There is no pericardial effusion noted.  Aorta: The aortic root is normal.  Systemic Veins: The inferior vena cava appears to be of normal size.  In comparison to the previous echocardiogram(s): There are no prior studies on this patient for comparison purposes.      CONCLUSIONS:  1. Left ventricular systolic function is moderately decreased with a 35-40% estimated ejection fraction.  2. Spectral Doppler shows an impaired relaxation pattern of left ventricular diastolic filling.  3. Mild aortic valve stenosis.  4. There is no evidence of a patent foramen ovale.    QUANTITATIVE DATA SUMMARY:  2D MEASUREMENTS:  Normal Ranges:  Ao Root d:     3.30 cm    (2.0-3.7cm)  LAs:           3.50 cm    (2.7-4.0cm)  IVSd:          1.30 cm    (0.6-1.1cm)  LVPWd:         0.90 cm    (0.6-1.1cm)  LVIDd:         5.40 cm    (3.9-5.9cm)  LVIDs:         4.60 cm  LV Mass Index: 109.8 g/m2  LV % FS        14.8 %    LA VOLUME:  Normal Ranges:  LA Vol A4C:        56.2 ml    (22+/-6mL/m2)  LA Vol A2C:         57.5 ml  LA Vol BP:         57.5 ml  LA Vol Index A4C:  26.3ml/m2  LA Vol Index A2C:  26.9 ml/m2  LA Vol Index BP:   26.9 ml/m2  LA Area A4C:       17.8 cm2  LA Area A2C:       18.2 cm2  LA Major Axis A4C: 4.8 cm  LA Major Axis A2C: 4.9 cm  LA Volume Index:   26.9 ml/m2    RA VOLUME BY A/L METHOD:  Normal Ranges:  RA Vol A4C:        38.6 ml    (8.3-19.5ml)  RA Vol Index A4C:  18.0 ml/m2  RA Area A4C:       14.0 cm2  RA Major Axis A4C: 4.3 cm    M-MODE MEASUREMENTS:  Normal Ranges:  Ao Root: 3.10 cm (2.0-3.7cm)  LAs:     3.50 cm (2.7-4.0cm)    AORTA MEASUREMENTS:  Normal Ranges:  Ao Sinus, d: 3.30 cm (2.1-3.5cm)  Ao STJ, d:   2.50 cm (1.7-3.4cm)  Asc Ao, d:   3.50 cm (2.1-3.4cm)    LV SYSTOLIC FUNCTION BY 2D PLANIMETRY (MOD):  Normal Ranges:  EF-A4C View: 39.2 % (>=55%)  EF-A2C View: 40.7 %  EF-Biplane:  40.5 %    LV DIASTOLIC FUNCTION:  Normal Ranges:  MV Peak E:        0.30 m/s    (0.7-1.2 m/s)  MV Peak A:        0.57 m/s    (0.42-0.7 m/s)  E/A Ratio:        0.53        (1.0-2.2)  MV e'             0.03 m/s    (>8.0)  MV lateral e'     0.03 m/s  MV medial e'      0.04 m/s  MV A Dur:         100.00 msec  E/e' Ratio:       10.03       (<8.0)  a'                0.05 m/s  PulmV Sys Ravi:    38.10 cm/s  PulmV [place] Ravi:   20.80 cm/s  PulmV S/D Ravi:    1.80  PulmV A Revs Ravi: 25.10 cm/s  PulmV A Revs Dur: 106.00 msec    MITRAL VALVE:  Normal Ranges:  MV DT: 333 msec (150-240msec)    AORTIC VALVE:  Normal Ranges:  AoV Vmax:                1.10 m/s (<=1.7m/s)  AoV Peak P.8 mmHg (<20mmHg)  AoV Mean PG:             3.0 mmHg (1.7-11.5mmHg)  LVOT Max Ravi:            0.69 m/s (<=1.1m/s)  AoV VTI:                 14.90 cm (18-25cm)  LVOT VTI:                8.59 cm  LVOT Diameter:           2.00 cm  (1.8-2.4cm)  AoV Area, VTI:           1.81 cm2 (2.5-5.5cm2)  AoV Area,Vmax:           1.98 cm2 (2.5-4.5cm2)  AoV Dimensionless Index: 0.58      RIGHT VENTRICLE:  RV Basal 3.58 cm  RV Mid   2.66 cm  RV Major 7.1  cm  TAPSE:   20.6 mm  RV s'    0.13 m/s    TRICUSPID VALVE/RVSP:  Normal Ranges:  Est. RA Pressure: 3 mmHg  TV E Vmax:        0.28 m/s (0.3-0.7m/s)  TV A Vmax:        0.37 m/s  IVC Diam:         1.05 cm    PULMONIC VALVE:  Normal Ranges:  PV Accel Time: 63 msec  (>120ms)  PV Max Ravi:    0.7 m/s  (0.6-0.9m/s)  PV Max P.0 mmHg    Pulmonary Veins:  PulmV A Revs Dur: 106.00 msec  PulmV A Revs Ravi: 25.10 cm/s  PulmV [place] Ravi:   20.80 cm/s  PulmV S/D Ravi:    1.80  PulmV Sys Ravi:    38.10 cm/s      [id number] Paulo Jain MD  Electronically signed on 2023 at 7:42:25 AM        ** Final **    Ejection Fractions:  LV biplane EF   Date/Time Value Ref Range Status   2023 12:13 PM 41       Cath:    Left heart cath 3/08/2021  Right dominant circulation  LMT - normal  LAD - 60-70% mid; 70-80% distal  LCX - small, non dominant; 70-80% proximal; relatively small vessel.  RCA - total mid occlusion.  LVGram - very limited contrast injection. Diffuse hypokinesis. LVEF 40-45%.   CONCLUSIONS:   Total occlusion of the RCA  70-80% lesion in a small non dominant LCX.  60-70% proximal LAD and 70-80% very distal LAD.    RECOMMENDATIONS:  Medical treatment   Risk reduction  Cessation of cocaine and tobacco abuse.        2007  Successful PTCA/2x stent deployment to severe mid-right  coronary artery stenosis with 0% angiographic residual and  restoration of JOSHUA Grade III antegrade flow.    09/10/2010  PTCA/2x stent deployment to severe mid-right  coronary artery stenosis with 0% angiographic residual and  restoration of JOSHUA Grade III antegrade flow.  Aspiration thrombectomy was performed  with a Pronto LP catheter and returned large amounts of white clot. The  lesion in the mid RCA was pre-dilated with a 2.0/12 Voyager Rx (10 kristina)  and stented with a Vision 2.73/23 BMS    2019  NSTEMI.  Native Coronary Artery Disease in the LAD (Moderate) and RCA (Severe)  Angioplasty and Synergy (Drug Eluting Stent) Placement  in Proximal, Mid and Distal RCA     Cardiac Imaging:  No results found for this or any previous visit.    Inpatient Medications:  Scheduled medications   Medication Dose Route Frequency    aspirin  81 mg oral Daily    atorvastatin  80 mg oral Nightly    clopidogrel  75 mg oral Daily    enoxaparin  40 mg subcutaneous Daily    isosorbide mononitrate ER  30 mg oral Daily    melatonin  3 mg oral Daily    perflutren lipid microspheres  0.5-10 mL of dilution intravenous Once in imaging    perflutren protein A microsphere  0.5 mL intravenous Once in imaging    sacubitriL-valsartan  1 tablet oral BID    sennosides  2 tablet oral BID    sulfur hexafluoride microsphr  2 mL intravenous Once in imaging     PRN medications   Medication    acetaminophen    Or    acetaminophen    Or    acetaminophen    acetaminophen    Or    acetaminophen    Or    acetaminophen    hydrALAZINE    magnesium hydroxide    ondansetron    Or    ondansetron    oxygen     Continuous Medications   Medication Dose Last Rate       Physical Exam:  GENERAL: alert, cooperative, pleasant, in no acute distress  SKIN: warm, dry  NECK: no JVD  CARDIAC: Regular rate and rhythm no murmurs  CHEST: Normal respiratory efforts, lungs clear to auscultation bilaterally.  ABDOMEN: soft, nondistended  EXTREMITIES: no lower extremity edema  NEURO: Alert and oriented x 3.  Grossly normal.  Moves all 4 extremities.      Assessment/Plan     Román Marinelli is a 68 y.o. male with past medical history significant for Coronary artery disease status post MI with  multiple PCIs including PTCA to RCA x2 stesnt 3/2007, Mid RCA stent 9/2010, PCI to RCA and distal RCA 11/2019 , Ischemic cardiomyopathy LVEF 40-45% on LV gram/LHC 3/2021, Cocaine use, Obesity, Hypertension. Presented with  left sided weakness and facial droop. Cardiology is consulted for HF/CM EF 30%.  Patient reports diagnosed with acute CVA.  Was seen by neurology.  Started on aspirin, Plavix and statin.  Admits to  medication noncompliance.  Was not taking any of his medications including aspirin.     Ischemic cardiomyopathy- echocardiogram obtained this admission showed LVEF 35-40%.  review of records show patient has a history of cardiomyopathy.  LV gram and left heart cath showed LVEF 40-45% 3/2021.  Unable to locate any prior echocardiograms.  Patient denies any ischemic symptoms.     He is euvolemic on clinical exam.       Recommend starting patient on GDMT with Entresto 24/26 mg twice daily add Imdur 30 mg daily.   Will consider beta-blocker tomorrow after permissive hypertension for CVA.      Continue aspirin 81 mg daily and atorvastatin 80 mg daily.  Was also started on Plavix 75 mg daily per neurology for management of CVA.     Continue to monitor on telemetry    Code Status:  Full Code    Thank you for allowing me to participate in their care.  Please feel free to call me with any further questions or concerns.    Thierry Mcmahan MD, [facility], [facility]  Division of Cardiovascular Medicine  Medical Director, [facility] [place]  [facility]  Assistant Clinical Professor, Medicine  [employer]  [email or web address]  Office:  [phone]     I personally reviewed the patient's vital signs, telemetry, recent labs, medications, orders, EKGs, and pertinent cardiac imaging/ echocardiography.  I have reviewed the KERRI's encounter note, approve the KERRI's documentation and have edited the note to reflect my diagnostic and therapeutic plan.

## 2023-11-21 NOTE — PROGRESS NOTES
Román Marinelli is a 68 y.o. male on day 4 of admission presenting with Acute arterial ischemic stroke, multifocal, mult vascular territories (CMS/HCC).        Patient remains in SDU. [facility] accepted patient to condition of post AR plan. According to notes, family unable to take care of him at home. Requested from [facility] to start auth. Will follow for approval from insurance. Also requested from  to see patient for resource groups due to patient lost his mother prior to coming to the hospital.    1235      This TCC got call from Laura from [facility] that they can't accept patient. [facility] is [facility] per patient and they are able to accept. Requested from SNF to start auth. Will follow.

## 2023-11-21 NOTE — CARE PLAN
Problem: General Stroke  Goal: Demonstrate improvement in neurological exam throughout the shift  Outcome: Progressing  Goal: Maintain BP within ordered limits throughout shift  Outcome: Progressing  Goal: No symptoms of aspiration throughout shift  Outcome: Progressing     Problem: Skin  Goal: Prevent/manage excess moisture  Outcome: Progressing  Goal: Prevent/minimize sheer/friction injuries  Outcome: Progressing  Goal: Promote skin healing  Outcome: Progressing     Problem: Fall/Injury  Goal: Not fall by end of shift  Outcome: Progressing  Goal: Be free from injury by end of the shift  Outcome: Progressing  Goal: Verbalize understanding of personal risk factors for fall in the hospital  Outcome: Progressing  Note: Call light to be in reach, bed alarm activated to prevent independent bed exit.     Problem: Pain - Adult  Goal: Verbalizes/displays adequate comfort level or baseline comfort level  Outcome: Progressing     Problem: Safety - Adult  Goal: Free from fall injury  Outcome: Progressing     Problem: Discharge Planning  Goal: Discharge to home or other facility with appropriate resources  Outcome: Progressing     Problem: Chronic Conditions and Co-morbidities  Goal: Patient's chronic conditions and co-morbidity symptoms are monitored and maintained or improved  Outcome: Progressing   The patient's goals for the shift include      The clinical goals for the shift include to promote range of motion on left side to increase strength    Over the shift, the patient did not make progress toward the following goals. Barriers to progression include . Recommendations to address these barriers include .

## 2023-11-21 NOTE — CARE PLAN
Problem: General Stroke  Goal: Demonstrate improvement in neurological exam throughout the shift  11/21/2023 1343 by Raine Rahman RN  Outcome: Progressing  11/21/2023 0836 by Raine Rahman RN  Outcome: Progressing  Goal: Maintain BP within ordered limits throughout shift  11/21/2023 1343 by Raine Rahman RN  Outcome: Progressing  11/21/2023 0836 by Raine Rahman RN  Outcome: Progressing  Goal: No symptoms of aspiration throughout shift  11/21/2023 1343 by Raine Rahman RN  Outcome: Progressing  11/21/2023 0836 by Raine Rahman RN  Outcome: Progressing     Problem: Skin  Goal: Prevent/manage excess moisture  11/21/2023 1343 by Raine Rahman RN  Outcome: Progressing  11/21/2023 0836 by Raine Rahman RN  Outcome: Progressing   The patient's goals for the shift include      The clinical goals for the shift include to promote range of motion on left side to increase strength    Over the shift, the patient did not make progress toward the following goals. Barriers to progression include . Recommendations to address these barriers include .

## 2023-11-22 VITALS
BODY MASS INDEX: 32.39 KG/M2 | HEIGHT: 69 IN | TEMPERATURE: 96.7 F | SYSTOLIC BLOOD PRESSURE: 121 MMHG | DIASTOLIC BLOOD PRESSURE: 77 MMHG | RESPIRATION RATE: 18 BRPM | OXYGEN SATURATION: 96 % | HEART RATE: 95 BPM | WEIGHT: 218.7 LBS

## 2023-11-22 LAB
ANION GAP SERPL CALC-SCNC: 13 MMOL/L (ref 10–20)
BUN SERPL-MCNC: 20 MG/DL (ref 6–23)
CALCIUM SERPL-MCNC: 8.8 MG/DL (ref 8.6–10.3)
CHLORIDE SERPL-SCNC: 107 MMOL/L (ref 98–107)
CO2 SERPL-SCNC: 20 MMOL/L (ref 21–32)
CREAT SERPL-MCNC: 1.17 MG/DL (ref 0.5–1.3)
ERYTHROCYTE [DISTWIDTH] IN BLOOD BY AUTOMATED COUNT: 14 % (ref 11.5–14.5)
GFR SERPL CREATININE-BSD FRML MDRD: 69 ML/MIN/1.73M*2
GLUCOSE BLD MANUAL STRIP-MCNC: 135 MG/DL (ref 74–99)
GLUCOSE SERPL-MCNC: 107 MG/DL (ref 74–99)
HCT VFR BLD AUTO: 45.6 % (ref 41–52)
HGB BLD-MCNC: 15.2 G/DL (ref 13.5–17.5)
MCH RBC QN AUTO: 27.5 PG (ref 26–34)
MCHC RBC AUTO-ENTMCNC: 33.3 G/DL (ref 32–36)
MCV RBC AUTO: 83 FL (ref 80–100)
NRBC BLD-RTO: 0 /100 WBCS (ref 0–0)
PLATELET # BLD AUTO: 244 X10*3/UL (ref 150–450)
POTASSIUM SERPL-SCNC: 3.9 MMOL/L (ref 3.5–5.3)
RBC # BLD AUTO: 5.53 X10*6/UL (ref 4.5–5.9)
SODIUM SERPL-SCNC: 136 MMOL/L (ref 136–145)
WBC # BLD AUTO: 7 X10*3/UL (ref 4.4–11.3)

## 2023-11-22 PROCEDURE — 2500000001 HC RX 250 WO HCPCS SELF ADMINISTERED DRUGS (ALT 637 FOR MEDICARE OP): Performed by: NURSE PRACTITIONER

## 2023-11-22 PROCEDURE — 96372 THER/PROPH/DIAG INJ SC/IM: CPT | Performed by: NURSE PRACTITIONER

## 2023-11-22 PROCEDURE — 36415 COLL VENOUS BLD VENIPUNCTURE: CPT | Performed by: NURSE PRACTITIONER

## 2023-11-22 PROCEDURE — 80048 BASIC METABOLIC PNL TOTAL CA: CPT | Performed by: NURSE PRACTITIONER

## 2023-11-22 PROCEDURE — 85027 COMPLETE CBC AUTOMATED: CPT | Performed by: NURSE PRACTITIONER

## 2023-11-22 PROCEDURE — 2500000004 HC RX 250 GENERAL PHARMACY W/ HCPCS (ALT 636 FOR OP/ED): Performed by: NURSE PRACTITIONER

## 2023-11-22 PROCEDURE — 82947 ASSAY GLUCOSE BLOOD QUANT: CPT

## 2023-11-22 PROCEDURE — 99233 SBSQ HOSP IP/OBS HIGH 50: CPT | Performed by: STUDENT IN AN ORGANIZED HEALTH CARE EDUCATION/TRAINING PROGRAM

## 2023-11-22 RX ORDER — TALC
3 POWDER (GRAM) TOPICAL DAILY
Start: 2023-11-22

## 2023-11-22 RX ORDER — ISOSORBIDE MONONITRATE 30 MG/1
30 TABLET, EXTENDED RELEASE ORAL DAILY
Start: 2023-11-22

## 2023-11-22 RX ORDER — MAGNESIUM HYDROXIDE 2400 MG/10ML
10 SUSPENSION ORAL DAILY PRN
Start: 2023-11-22

## 2023-11-22 RX ORDER — ACETAMINOPHEN 325 MG/1
650 TABLET ORAL EVERY 6 HOURS PRN
Qty: 30 TABLET | Refills: 0
Start: 2023-11-22

## 2023-11-22 RX ORDER — METOPROLOL SUCCINATE 25 MG/1
25 TABLET, EXTENDED RELEASE ORAL DAILY
Start: 2023-11-22

## 2023-11-22 RX ORDER — ACETAMINOPHEN 325 MG/1
650 TABLET ORAL EVERY 6 HOURS PRN
Qty: 30 TABLET | Refills: 0 | OUTPATIENT
Start: 2023-11-22

## 2023-11-22 RX ORDER — CLOPIDOGREL BISULFATE 75 MG/1
75 TABLET ORAL DAILY
Qty: 21 TABLET | Refills: 0
Start: 2023-11-22 | End: 2023-12-13

## 2023-11-22 RX ORDER — ATORVASTATIN CALCIUM 80 MG/1
80 TABLET, FILM COATED ORAL NIGHTLY
Start: 2023-11-22

## 2023-11-22 RX ORDER — METOPROLOL SUCCINATE 25 MG/1
25 TABLET, EXTENDED RELEASE ORAL DAILY
Status: DISCONTINUED | OUTPATIENT
Start: 2023-11-22 | End: 2023-11-22 | Stop reason: HOSPADM

## 2023-11-22 RX ORDER — METOPROLOL SUCCINATE 25 MG/1
25 TABLET, EXTENDED RELEASE ORAL DAILY
Qty: 30 TABLET | Refills: 1 | OUTPATIENT
Start: 2023-11-22 | End: 2024-01-21

## 2023-11-22 RX ORDER — ASPIRIN 81 MG/1
81 TABLET ORAL DAILY
Start: 2023-11-22

## 2023-11-22 RX ADMIN — ATORVASTATIN CALCIUM 80 MG: 80 TABLET, FILM COATED ORAL at 20:26

## 2023-11-22 RX ADMIN — METOPROLOL SUCCINATE 25 MG: 25 TABLET, FILM COATED, EXTENDED RELEASE ORAL at 17:37

## 2023-11-22 RX ADMIN — ENOXAPARIN SODIUM 40 MG: 40 INJECTION SUBCUTANEOUS at 10:02

## 2023-11-22 RX ADMIN — ISOSORBIDE MONONITRATE 30 MG: 30 TABLET, EXTENDED RELEASE ORAL at 10:08

## 2023-11-22 RX ADMIN — SACUBITRIL AND VALSARTAN 1 TABLET: 24; 26 TABLET, FILM COATED ORAL at 10:02

## 2023-11-22 RX ADMIN — SENNOSIDES 17.2 MG: 8.6 TABLET, FILM COATED ORAL at 10:02

## 2023-11-22 RX ADMIN — SENNOSIDES 17.2 MG: 8.6 TABLET, FILM COATED ORAL at 20:26

## 2023-11-22 RX ADMIN — CLOPIDOGREL 75 MG: 75 TABLET ORAL at 10:02

## 2023-11-22 RX ADMIN — SACUBITRIL AND VALSARTAN 1 TABLET: 24; 26 TABLET, FILM COATED ORAL at 20:26

## 2023-11-22 RX ADMIN — ASPIRIN 81 MG: 81 TABLET, COATED ORAL at 10:02

## 2023-11-22 ASSESSMENT — PAIN - FUNCTIONAL ASSESSMENT
PAIN_FUNCTIONAL_ASSESSMENT: 0-10
PAIN_FUNCTIONAL_ASSESSMENT: 0-10

## 2023-11-22 ASSESSMENT — PAIN SCALES - GENERAL
PAINLEVEL_OUTOF10: 0 - NO PAIN
PAINLEVEL_OUTOF10: 0 - NO PAIN

## 2023-11-22 NOTE — PROGRESS NOTES
Occupational Therapy                 Therapy Communication Note    Patient Name: Román Marinelli  MRN: [id number]  Today's Date: 11/22/2023     Discipline: Occupational Therapy    Missed Visit Reason: Missed Visit Reason: Other (Comment) (Attempted OT tx, per TCC (Princess GOMEZ), pt d/c shortly with no acute OT needs.)    Missed Time: Attempt

## 2023-11-22 NOTE — PROGRESS NOTES
"Subjective Data:  Sitting in bed.  Denies chest pain or shortness of breath  Sustained another fall today.       Overnight Events:      Objective Data:  Last Recorded Vitals:  Vitals:    23 0000 23 0437 23 0800 23 1209   BP: 127/73 153/68 117/68 137/66   Patient Position: Lying Lying     Pulse: 84 99 89 80   Resp:    Temp: 36.3 °C (97.4 °F) 36.3 °C (97.3 °F) 36.3 °C (97.4 °F)    TempSrc: Temporal Temporal     SpO2: 97% 96% 98% 91%   Weight:       Height:         Medical Gas Therapy: None (Room air)  Weight  Av.6 kg (217 lb 6.4 oz)  Min: 98.3 kg (216 lb 12.8 oz)  Max: 99.2 kg (218 lb 11.1 oz)    LABS:  CMP:  Results from last 7 days   Lab Units 23  0603 23  0514 23  0529 23  1145 23  2330 23  0613 23  1407   SODIUM mmol/L 136 137 137 137  --  138 136   POTASSIUM mmol/L 3.9 4.2 3.8 4.0  --  3.9 2.1*   CHLORIDE mmol/L 107 106 108* 108*  --  107 115*   CO2 mmol/L 20* 23 21 20*  --  21 16*   ANION GAP mmol/L 13 12 12 13  --  14 7*   BUN mg/dL 20 24* 24* 28*  --  17 7   CREATININE mg/dL 1.17 1.42* 1.23 1.28  --  1.33* 0.44*   EGFR mL/min/1.73m*2 69 54* 65 62  --  59* >90   MAGNESIUM mg/dL  --   --   --   --  2.00  --   --    ALBUMIN g/dL  --   --   --   --   --   --  2.0*   ALT U/L  --   --   --   --   --   --  7*   AST U/L  --   --   --   --   --   --  8*   BILIRUBIN TOTAL mg/dL  --   --   --   --   --   --  0.5       CBC:  Results from last 7 days   Lab Units 23  0603 23  0514 23  0529 23  1145 23  0612 23  1407   WBC AUTO x10*3/uL 7.0 7.6 7.7 7.2 8.9 8.2   HEMOGLOBIN g/dL 15.2 14.4 14.8 14.9 15.8 14.4   HEMATOCRIT % 45.6 44.6 45.9 46.3 48.1 44.5   PLATELETS AUTO x10*3/uL 244 253 245 254 254 240   MCV fL 83 85 84 85 84 83       COAG:   Results from last 7 days   Lab Units 23  1407   INR  1.3*       ABO: No results found for: \"ABO\"  HEME/ENDO:  Results from last 7 days   Lab Units 23  1831 "   HEMOGLOBIN A1C % 5.9*        CARDIAC:   Results from last 7 days   Lab Units 11/17/23  1831 11/17/23  1407   TROPHS ng/L  --  19   BNP pg/mL 202*  --         Results from last 7 days   Lab Units 11/17/23  1831   HEMOGLOBIN A1C % 5.9*   LDL CALC mg/dL 138*   VLDL mg/dL 28   CHOLESTEROL/HDL RATIO  4.1          Last I/O:    Intake/Output Summary (Last 24 hours) at 11/22/2023 1355  Last data filed at 11/22/2023 0437  Gross per 24 hour   Intake --   Output 200 ml   Net -200 ml     Net IO Since Admission: -1,930 mL [11/22/23 1355]      Imaging Results:  Transthoracic Echo (TTE) Complete    Result Date: 11/19/2023   [facility], [address] [place] [address]              Tel [phone] and Fax [phone] TRANSTHORACIC ECHOCARDIOGRAM REPORT  Patient Name:      PRABHAKAR Nath Physician:    [id number] Jain MD Study Date:        11/18/2023           Ordering Provider:    [id number]                                                               SASHA CALDERON MRN/PID:           [id number]             Fellow: Accession#:        [id number]         Nurse: Date of Birth/Age: 1955 / 68 years Sonographer:          Shelby Rowland RDCS Gender:            M                    Additional Staff: Height:            175.26 cm            Admit Date:           11/17/2023 Weight:            98.43 kg             Admission Status:     Inpatient -                                                               Routine BSA:               2.14 m2              Encounter#:           [id number]                                         Department Location:  [facility] Non                                                               Invasive Blood Pressure: 146 /94 mmHg Study Type:    TRANSTHORACIC ECHO (TTE) COMPLETE Diagnosis/ICD: Other cerebral infarction-I63.89 Indication:     Stroke CPT Code:      Echo Complete w Full Doppler-[id number] Patient History: Pertinent History: Chest Pain. Stroke, Acute Coronary Syndrome. Study Detail: The following Echo studies were performed: 2D, Doppler, M-Mode and               color flow. Technically challenging study due to poor acoustic               windows, the patient's lack of cooperation and patient lying in               supine position. Definity used as a contrast agent for endocardial               border definition and agitated saline used as a contrast agent for               intraseptal flow evaluation.  PHYSICIAN INTERPRETATION: Left Ventricle: The left ventricular systolic function is moderately decreased, with an estimated ejection fraction of 35-40%. Wall motion is abnormal. The left ventricular cavity size is normal. Spectral Doppler shows an impaired relaxation pattern of left ventricular diastolic filling. Left Atrium: The left atrium is normal in size. There is no evidence of a patent foramen ovale. A bubble study using agitated saline was performed. Bubble study is negative. Right Ventricle: The right ventricle is normal in size. There is normal right ventricular global systolic function. Right Atrium: The right atrium is normal in size. Aortic Valve: The aortic valve is trileaflet. There is moderate aortic valve thickening. There is evidence of mild aortic valve stenosis. There is no evidence of aortic valve regurgitation. The peak instantaneous gradient of the aortic valve is 4.8 mmHg. The mean gradient of the aortic valve is 3.0 mmHg. Mitral Valve: The mitral valve is normal in structure. There is trace mitral valve regurgitation. Tricuspid Valve: The tricuspid valve is structurally normal. There is trace tricuspid regurgitation. Pulmonic Valve: The pulmonic valve is not well visualized. The pulmonic valve regurgitation was not well visualized. Pericardium: There is no pericardial effusion noted. Aorta: The aortic root is normal.  Systemic Veins: The inferior vena cava appears to be of normal size. In comparison to the previous echocardiogram(s): There are no prior studies on this patient for comparison purposes.  CONCLUSIONS:  1. Left ventricular systolic function is moderately decreased with a 35-40% estimated ejection fraction.  2. Spectral Doppler shows an impaired relaxation pattern of left ventricular diastolic filling.  3. Mild aortic valve stenosis.  4. There is no evidence of a patent foramen ovale. QUANTITATIVE DATA SUMMARY: 2D MEASUREMENTS:                           Normal Ranges: Ao Root d:     3.30 cm    (2.0-3.7cm) LAs:           3.50 cm    (2.7-4.0cm) IVSd:          1.30 cm    (0.6-1.1cm) LVPWd:         0.90 cm    (0.6-1.1cm) LVIDd:         5.40 cm    (3.9-5.9cm) LVIDs:         4.60 cm LV Mass Index: 109.8 g/m2 LV % FS        14.8 % LA VOLUME:                               Normal Ranges: LA Vol A4C:        56.2 ml    (22+/-6mL/m2) LA Vol A2C:        57.5 ml LA Vol BP:         57.5 ml LA Vol Index A4C:  26.3ml/m2 LA Vol Index A2C:  26.9 ml/m2 LA Vol Index BP:   26.9 ml/m2 LA Area A4C:       17.8 cm2 LA Area A2C:       18.2 cm2 LA Major Axis A4C: 4.8 cm LA Major Axis A2C: 4.9 cm LA Volume Index:   26.9 ml/m2 RA VOLUME BY A/L METHOD:                               Normal Ranges: RA Vol A4C:        38.6 ml    (8.3-19.5ml) RA Vol Index A4C:  18.0 ml/m2 RA Area A4C:       14.0 cm2 RA Major Axis A4C: 4.3 cm M-MODE MEASUREMENTS:                  Normal Ranges: Ao Root: 3.10 cm (2.0-3.7cm) LAs:     3.50 cm (2.7-4.0cm) AORTA MEASUREMENTS:                      Normal Ranges: Ao Sinus, d: 3.30 cm (2.1-3.5cm) Ao STJ, d:   2.50 cm (1.7-3.4cm) Asc Ao, d:   3.50 cm (2.1-3.4cm) LV SYSTOLIC FUNCTION BY 2D PLANIMETRY (MOD):                     Normal Ranges: EF-A4C View: 39.2 % (>=55%) EF-A2C View: 40.7 % EF-Biplane:  40.5 % LV DIASTOLIC FUNCTION:                               Normal Ranges: MV Peak E:        0.30 m/s    (0.7-1.2 m/s) MV Peak A:         0.57 m/s    (0.42-0.7 m/s) E/A Ratio:        0.53        (1.0-2.2) MV e'             0.03 m/s    (>8.0) MV lateral e'     0.03 m/s MV medial e'      0.04 m/s MV A Dur:         100.00 msec E/e' Ratio:       10.03       (<8.0) a'                0.05 m/s PulmV Sys Ravi:    38.10 cm/s PulmV [place] Ravi:   20.80 cm/s PulmV S/D Ravi:    1.80 PulmV A Revs Ravi: 25.10 cm/s PulmV A Revs Dur: 106.00 msec MITRAL VALVE:                 Normal Ranges: MV DT: 333 msec (150-240msec) AORTIC VALVE:                                   Normal Ranges: AoV Vmax:                1.10 m/s (<=1.7m/s) AoV Peak P.8 mmHg (<20mmHg) AoV Mean PG:             3.0 mmHg (1.7-11.5mmHg) LVOT Max Raiv:            0.69 m/s (<=1.1m/s) AoV VTI:                 14.90 cm (18-25cm) LVOT VTI:                8.59 cm LVOT Diameter:           2.00 cm  (1.8-2.4cm) AoV Area, VTI:           1.81 cm2 (2.5-5.5cm2) AoV Area,Vmax:           1.98 cm2 (2.5-4.5cm2) AoV Dimensionless Index: 0.58  RIGHT VENTRICLE: RV Basal 3.58 cm RV Mid   2.66 cm RV Major 7.1 cm TAPSE:   20.6 mm RV s'    0.13 m/s TRICUSPID VALVE/RVSP:                            Normal Ranges: Est. RA Pressure: 3 mmHg TV E Vmax:        0.28 m/s (0.3-0.7m/s) TV A Vmax:        0.37 m/s IVC Diam:         1.05 cm PULMONIC VALVE:                         Normal Ranges: PV Accel Time: 63 msec  (>120ms) PV Max Ravi:    0.7 m/s  (0.6-0.9m/s) PV Max P.0 mmHg Pulmonary Veins: PulmV A Revs Dur: 106.00 msec PulmV A Revs Ravi: 25.10 cm/s PulmV [place] Ravi:   20.80 cm/s PulmV S/D Ravi:    1.80 PulmV Sys Ravi:    38.10 cm/s  [id number] Paulo Jain MD Electronically signed on 2023 at 7:42:25 AM  ** Final **         Past Cardiology Tests (Last 3 Years):  EKG:  Results for orders placed during the hospital encounter of 23    ECG 12 lead    Narrative  Sinus rhythm with sinus arrhythmia with Fusion complexes  Inferior infarct (cited on or before 2023)  T wave abnormality, consider lateral  ischemia  Abnormal ECG  Confirmed by Spencer Hart ([id number]) on 11/20/2023 3:39:09 PM    Echo:  Results for orders placed during the hospital encounter of 11/17/23    Transthoracic Echo (TTE) Complete    Narrative  [facility], [address] [place] [address]  Tel [phone] and Fax [phone]    TRANSTHORACIC ECHOCARDIOGRAM REPORT      Patient Name:      PRABHAKAR LEWIS      Reading Physician:    [id number] Jain MD  Study Date:        11/18/2023           Ordering Provider:    [id number]  SASHA CALDERON  MRN/PID:           [id number]             Fellow:  Accession#:        [id number]         Nurse:  Date of Birth/Age: 1955 / 68 years Sonographer:          Shelby Rowland RDCS  Gender:            M                    Additional Staff:  Height:            175.26 cm            Admit Date:           11/17/2023  Weight:            98.43 kg             Admission Status:     Inpatient -  Routine  BSA:               2.14 m2              Encounter#:           [id number]  Department Location:  [facility] Non  Invasive  Blood Pressure: 146 /94 mmHg    Study Type:    TRANSTHORACIC ECHO (TTE) COMPLETE  Diagnosis/ICD: Other cerebral infarction-I63.89  Indication:    Stroke  CPT Code:      Echo Complete w Full Doppler-31006    Patient History:  Pertinent History: Chest Pain. Stroke, Acute Coronary Syndrome.    Study Detail: The following Echo studies were performed: 2D, Doppler, M-Mode and  color flow. Technically challenging study due to poor acoustic  windows, the patient's lack of cooperation and patient lying in  supine position. Definity used as a contrast agent for endocardial  border definition and agitated saline used as a contrast agent for  intraseptal flow evaluation.      PHYSICIAN INTERPRETATION:  Left Ventricle: The left ventricular systolic function is moderately decreased, with an estimated ejection fraction of 35-40%. Wall motion is abnormal. The left ventricular cavity  size is normal. Spectral Doppler shows an impaired relaxation pattern of left ventricular diastolic filling.  Left Atrium: The left atrium is normal in size. There is no evidence of a patent foramen ovale. A bubble study using agitated saline was performed. Bubble study is negative.  Right Ventricle: The right ventricle is normal in size. There is normal right ventricular global systolic function.  Right Atrium: The right atrium is normal in size.  Aortic Valve: The aortic valve is trileaflet. There is moderate aortic valve thickening. There is evidence of mild aortic valve stenosis.  There is no evidence of aortic valve regurgitation. The peak instantaneous gradient of the aortic valve is 4.8 mmHg. The mean gradient of the aortic valve is 3.0 mmHg.  Mitral Valve: The mitral valve is normal in structure. There is trace mitral valve regurgitation.  Tricuspid Valve: The tricuspid valve is structurally normal. There is trace tricuspid regurgitation.  Pulmonic Valve: The pulmonic valve is not well visualized. The pulmonic valve regurgitation was not well visualized.  Pericardium: There is no pericardial effusion noted.  Aorta: The aortic root is normal.  Systemic Veins: The inferior vena cava appears to be of normal size.  In comparison to the previous echocardiogram(s): There are no prior studies on this patient for comparison purposes.      CONCLUSIONS:  1. Left ventricular systolic function is moderately decreased with a 35-40% estimated ejection fraction.  2. Spectral Doppler shows an impaired relaxation pattern of left ventricular diastolic filling.  3. Mild aortic valve stenosis.  4. There is no evidence of a patent foramen ovale.    QUANTITATIVE DATA SUMMARY:  2D MEASUREMENTS:  Normal Ranges:  Ao Root d:     3.30 cm    (2.0-3.7cm)  LAs:           3.50 cm    (2.7-4.0cm)  IVSd:          1.30 cm    (0.6-1.1cm)  LVPWd:         0.90 cm    (0.6-1.1cm)  LVIDd:         5.40 cm    (3.9-5.9cm)  LVIDs:         4.60 cm  LV  Mass Index: 109.8 g/m2  LV % FS        14.8 %    LA VOLUME:  Normal Ranges:  LA Vol A4C:        56.2 ml    (22+/-6mL/m2)  LA Vol A2C:        57.5 ml  LA Vol BP:         57.5 ml  LA Vol Index A4C:  26.3ml/m2  LA Vol Index A2C:  26.9 ml/m2  LA Vol Index BP:   26.9 ml/m2  LA Area A4C:       17.8 cm2  LA Area A2C:       18.2 cm2  LA Major Axis A4C: 4.8 cm  LA Major Axis A2C: 4.9 cm  LA Volume Index:   26.9 ml/m2    RA VOLUME BY A/L METHOD:  Normal Ranges:  RA Vol A4C:        38.6 ml    (8.3-19.5ml)  RA Vol Index A4C:  18.0 ml/m2  RA Area A4C:       14.0 cm2  RA Major Axis A4C: 4.3 cm    M-MODE MEASUREMENTS:  Normal Ranges:  Ao Root: 3.10 cm (2.0-3.7cm)  LAs:     3.50 cm (2.7-4.0cm)    AORTA MEASUREMENTS:  Normal Ranges:  Ao Sinus, d: 3.30 cm (2.1-3.5cm)  Ao STJ, d:   2.50 cm (1.7-3.4cm)  Asc Ao, d:   3.50 cm (2.1-3.4cm)    LV SYSTOLIC FUNCTION BY 2D PLANIMETRY (MOD):  Normal Ranges:  EF-A4C View: 39.2 % (>=55%)  EF-A2C View: 40.7 %  EF-Biplane:  40.5 %    LV DIASTOLIC FUNCTION:  Normal Ranges:  MV Peak E:        0.30 m/s    (0.7-1.2 m/s)  MV Peak A:        0.57 m/s    (0.42-0.7 m/s)  E/A Ratio:        0.53        (1.0-2.2)  MV e'             0.03 m/s    (>8.0)  MV lateral e'     0.03 m/s  MV medial e'      0.04 m/s  MV A Dur:         100.00 msec  E/e' Ratio:       10.03       (<8.0)  a'                0.05 m/s  PulmV Sys Ravi:    38.10 cm/s  PulmV [place]ster Ravi:   20.80 cm/s  PulmV S/D Ravi:    1.80  PulmV A Revs Ravi: 25.10 cm/s  PulmV A Revs Dur: 106.00 msec    MITRAL VALVE:  Normal Ranges:  MV DT: 333 msec (150-240msec)    AORTIC VALVE:  Normal Ranges:  AoV Vmax:                1.10 m/s (<=1.7m/s)  AoV Peak P.8 mmHg (<20mmHg)  AoV Mean PG:             3.0 mmHg (1.7-11.5mmHg)  LVOT Max Ravi:            0.69 m/s (<=1.1m/s)  AoV VTI:                 14.90 cm (18-25cm)  LVOT VTI:                8.59 cm  LVOT Diameter:           2.00 cm  (1.8-2.4cm)  AoV Area, VTI:           1.81 cm2 (2.5-5.5cm2)  AoV Area,Vmax:            1.98 cm2 (2.5-4.5cm2)  AoV Dimensionless Index: 0.58      RIGHT VENTRICLE:  RV Basal 3.58 cm  RV Mid   2.66 cm  RV Major 7.1 cm  TAPSE:   20.6 mm  RV s'    0.13 m/s    TRICUSPID VALVE/RVSP:  Normal Ranges:  Est. RA Pressure: 3 mmHg  TV E Vmax:        0.28 m/s (0.3-0.7m/s)  TV A Vmax:        0.37 m/s  IVC Diam:         1.05 cm    PULMONIC VALVE:  Normal Ranges:  PV Accel Time: 63 msec  (>120ms)  PV Max Ravi:    0.7 m/s  (0.6-0.9m/s)  PV Max P.0 mmHg    Pulmonary Veins:  PulmV A Revs Dur: 106.00 msec  PulmV A Revs Ravi: 25.10 cm/s  PulmV [place]ster Ravi:   20.80 cm/s  PulmV S/D Ravi:    1.80  PulmV Sys Ravi:    38.10 cm/s      [id number] Paulo Jain MD  Electronically signed on 2023 at 7:42:25 AM        ** Final **    Ejection Fractions:  LV biplane EF   Date/Time Value Ref Range Status   2023 12:13 PM 41       Cath:    Left heart cath 3/08/2021  Right dominant circulation  LMT - normal  LAD - 60-70% mid; 70-80% distal  LCX - small, non dominant; 70-80% proximal; relatively small vessel.  RCA - total mid occlusion.  LVGram - very limited contrast injection. Diffuse hypokinesis. LVEF 40-45%.   CONCLUSIONS:   Total occlusion of the RCA  70-80% lesion in a small non dominant LCX.  60-70% proximal LAD and 70-80% very distal LAD.    RECOMMENDATIONS:  Medical treatment   Risk reduction  Cessation of cocaine and tobacco abuse.        2007  Successful PTCA/2x stent deployment to severe mid-right  coronary artery stenosis with 0% angiographic residual and  restoration of JOSHUA Grade III antegrade flow.    09/10/2010  PTCA/2x stent deployment to severe mid-right  coronary artery stenosis with 0% angiographic residual and  restoration of JOSHUA Grade III antegrade flow.  Aspiration thrombectomy was performed  with a Pronto LP catheter and returned large amounts of white clot. The  lesion in the mid RCA was pre-dilated with a 2.0/12 Voyager Rx (10 kristina)  and stented with a Vision 2.73/23  BMS    11/27/2019  NSTEMI.  Native Coronary Artery Disease in the LAD (Moderate) and RCA (Severe)  Angioplasty and Synergy (Drug Eluting Stent) Placement in Proximal, Mid and Distal RCA     Cardiac Imaging:  No results found for this or any previous visit.    Inpatient Medications:  Scheduled medications   Medication Dose Route Frequency    aspirin  81 mg oral Daily    atorvastatin  80 mg oral Nightly    clopidogrel  75 mg oral Daily    enoxaparin  40 mg subcutaneous Daily    isosorbide mononitrate ER  30 mg oral Daily    melatonin  3 mg oral Daily    metoprolol succinate XL  25 mg oral Daily    perflutren lipid microspheres  0.5-10 mL of dilution intravenous Once in imaging    perflutren protein A microsphere  0.5 mL intravenous Once in imaging    sacubitriL-valsartan  1 tablet oral BID    sennosides  2 tablet oral BID    sulfur hexafluoride microsphr  2 mL intravenous Once in imaging     PRN medications   Medication    acetaminophen    Or    acetaminophen    Or    acetaminophen    acetaminophen    Or    acetaminophen    Or    acetaminophen    hydrALAZINE    magnesium hydroxide    ondansetron    Or    ondansetron    oxygen     Continuous Medications   Medication Dose Last Rate       Physical Exam:  GENERAL: alert, cooperative, pleasant, in no acute distress  SKIN: warm, dry  NECK: no JVD  CARDIAC: Regular rate and rhythm no murmurs  CHEST: Normal respiratory efforts, lungs clear to auscultation bilaterally.  ABDOMEN: soft, nondistended  EXTREMITIES: no lower extremity edema  NEURO: Alert and oriented x 3.  Grossly normal.  Moves all 4 extremities.      Assessment/Plan     Román Marinelli is a 68 y.o. male with past medical history significant for Coronary artery disease status post MI with  multiple PCIs including PTCA to RCA x2 stesnt 3/2007, Mid RCA stent 9/2010, PCI to RCA and distal RCA 11/2019 , Ischemic cardiomyopathy LVEF 40-45% on LV gram/LHC 3/2021, Cocaine use, Obesity, Hypertension. Presented with  left  sided weakness and facial droop. Cardiology is consulted for HF/CM EF 30%.  Patient reports diagnosed with acute CVA.  Was seen by neurology.  Started on aspirin, Plavix and statin.  Admits to medication noncompliance.  Was not taking any of his medications including aspirin.     Ischemic cardiomyopathy- echocardiogram obtained this admission showed LVEF 35-40%.  review of records show patient has a history of cardiomyopathy.  LV gram and left heart cath showed LVEF 40-45% 3/2021.  Unable to locate any prior echocardiograms.  Patient denies any ischemic symptoms.     He is euvolemic on clinical exam.       Recommend starting patient on GDMT with Entresto 24/26 mg twice daily add Imdur 30 mg daily.     Start metoprolol succinate 25mg daily.      Continue aspirin 81 mg daily and atorvastatin 80 mg daily.  Was also started on Plavix 75 mg daily per neurology for management of CVA.     Continue to monitor on telemetry    Code Status:  Full Code    Thank you for allowing me to participate in their care.  Please feel free to call me with any further questions or concerns.    Thierry Mcmahan MD, [facility], [facility]  Division of Cardiovascular Medicine  Medical Director, [facility] [place]  [facility]  Assistant Clinical Professor, Medicine  [employer]  [email or web address]  Office:  [phone]     Both the KERRI and I have had a face to face encounter with the patient today. I have examined the patient and edited the documented physical examination as necessary.  I personally reviewed the patient's vital signs, telemetry, recent labs, medications, orders, EKGs, and pertinent cardiac imaging/ echocardiography.  I have reviewed the KERRI's encounter note, approve the KERRI's documentation and have edited the note to reflect my diagnostic and therapeutic plan.

## 2023-11-22 NOTE — NURSING NOTE
Patient fell from sitting on chair.  Found by aid on the floor. Patient states he was trying to get from the chair to the bed when slipped.  He said he forgot to ask for help. Patient states he did not hit his head and that he does not feel any pain at this time. Patient was seated on chair with chair alarm, nonslip socks, call light within reach, and with door open. Patient was assisted with other staff members to stand up and was lifted onto bed.  Patient in bed with bed alarm on to zone 2, non slip socks in place, call light within reach, 3/4 side rails elevated, and reminded to call for assistance.

## 2023-11-23 NOTE — DISCHARGE SUMMARY
Discharge Diagnosis  Acute arterial ischemic stroke, multifocal, mult vascular territories (CMS/HCC)    Issues Requiring Follow-Up  With the PCP    Test Results Pending At Discharge  Pending Labs       No current pending labs.            Hospital Course   68-year-old -American male, who is known to history of hypertension, coronary artery disease, history of cocaine abuse, he was diagnosed with stroke, started on aspirin Plavix, also he has cardiomyopathy, seen by the cardiologist meds were adjusted, he was discharged to rehab    Pertinent Physical Exam At Time of Discharge  Physical Exam  Alert oriented 3.  Heart S1-S2.  Lungs clear.  Abdomen is soft.  Left-sided weakness  Home Medications     Medication List      START taking these medications     acetaminophen 325 mg tablet; Commonly known as: Tylenol; Take 2 tablets   (650 mg) by mouth every 6 hours if needed (pain).   aspirin 81 mg EC tablet; Take 1 tablet (81 mg) by mouth once daily.   atorvastatin 80 mg tablet; Commonly known as: Lipitor; Take 1 tablet (80   mg) by mouth once daily at bedtime.   clopidogrel 75 mg tablet; Commonly known as: Plavix; Take 1 tablet (75   mg) by mouth once daily for 21 days. Continue for 21 days, then   monotherapy with asa   isosorbide mononitrate ER 30 mg 24 hr tablet; Commonly known as: Imdur;   Take 1 tablet (30 mg) by mouth once daily. Do not crush or chew.   magnesium hydroxide 2,400 mg/10 mL suspension suspension; Commonly known   as: Milk of Magnesia; Take 10 mL by mouth once daily as needed for   constipation.   melatonin 3 mg tablet; Take 1 tablet (3 mg) by mouth once daily.   metoprolol succinate XL 25 mg 24 hr tablet; Commonly known as:   Toprol-XL; Take 1 tablet (25 mg) by mouth once daily. Do not crush or   chew.   sacubitriL-valsartan 24-26 mg tablet; Commonly known as: Entresto; Take   1 tablet by mouth 2 times a day.       Outpatient Follow-Up  No future appointments.    Brandi Titus MD

## 2023-11-24 LAB
AORTIC VALVE MEAN GRADIENT: 3
AORTIC VALVE PEAK VELOCITY: 1.1
AV PEAK GRADIENT: 4.8
AVA (PEAK VEL): 1.98
AVA (VTI): 1.81
BODY SURFACE AREA: 2.2 M2
EJECTION FRACTION APICAL 4 CHAMBER: 39.2
EJECTION FRACTION: 41
LEFT ATRIUM VOLUME AREA LENGTH INDEX BSA: 26.9
LEFT VENTRICLE INTERNAL DIMENSION DIASTOLE: 5.4 (ref 3.5–6)
LEFT VENTRICULAR OUTFLOW TRACT DIAMETER: 2
MITRAL VALVE E/A RATIO: 0.53
MITRAL VALVE E/E' RATIO: 10.03
RIGHT VENTRICLE FREE WALL PEAK S': 13
TRICUSPID ANNULAR PLANE SYSTOLIC EXCURSION: 2.1

## 2025-02-18 ENCOUNTER — HOSPITAL ENCOUNTER (INPATIENT)
Facility: HOSPITAL | Age: 70
DRG: 418 | End: 2025-02-18
Attending: STUDENT IN AN ORGANIZED HEALTH CARE EDUCATION/TRAINING PROGRAM | Admitting: INTERNAL MEDICINE
Payer: OTHER GOVERNMENT

## 2025-02-18 ENCOUNTER — APPOINTMENT (OUTPATIENT)
Dept: RADIOLOGY | Facility: HOSPITAL | Age: 70
DRG: 418 | End: 2025-02-18
Payer: OTHER GOVERNMENT

## 2025-02-18 ENCOUNTER — APPOINTMENT (OUTPATIENT)
Dept: CARDIOLOGY | Facility: HOSPITAL | Age: 70
DRG: 418 | End: 2025-02-18
Payer: OTHER GOVERNMENT

## 2025-02-18 DIAGNOSIS — N17.9 AKI (ACUTE KIDNEY INJURY) (CMS-HCC): ICD-10-CM

## 2025-02-18 DIAGNOSIS — K81.9 CHOLECYSTITIS: Primary | ICD-10-CM

## 2025-02-18 LAB
ALBUMIN SERPL BCP-MCNC: 4.1 G/DL (ref 3.4–5)
ALP SERPL-CCNC: 83 U/L (ref 33–136)
ALT SERPL W P-5'-P-CCNC: 35 U/L (ref 10–52)
AMPHETAMINES UR QL SCN: ABNORMAL
ANION GAP SERPL CALC-SCNC: 11 MMOL/L (ref 10–20)
APPEARANCE UR: CLEAR
AST SERPL W P-5'-P-CCNC: 26 U/L (ref 9–39)
BARBITURATES UR QL SCN: ABNORMAL
BASOPHILS # BLD AUTO: 0.03 X10*3/UL (ref 0–0.1)
BASOPHILS NFR BLD AUTO: 0.1 %
BENZODIAZ UR QL SCN: ABNORMAL
BILIRUB SERPL-MCNC: 1.8 MG/DL (ref 0–1.2)
BILIRUB UR STRIP.AUTO-MCNC: NEGATIVE MG/DL
BUN SERPL-MCNC: 21 MG/DL (ref 6–23)
BZE UR QL SCN: ABNORMAL
CALCIUM SERPL-MCNC: 9.9 MG/DL (ref 8.6–10.3)
CANNABINOIDS UR QL SCN: ABNORMAL
CARDIAC TROPONIN I PNL SERPL HS: 67 NG/L (ref 0–20)
CARDIAC TROPONIN I PNL SERPL HS: 69 NG/L (ref 0–20)
CHLORIDE SERPL-SCNC: 104 MMOL/L (ref 98–107)
CO2 SERPL-SCNC: 23 MMOL/L (ref 21–32)
COLOR UR: ABNORMAL
CREAT SERPL-MCNC: 1.43 MG/DL (ref 0.5–1.3)
EGFRCR SERPLBLD CKD-EPI 2021: 54 ML/MIN/1.73M*2
EOSINOPHIL # BLD AUTO: 0 X10*3/UL (ref 0–0.7)
EOSINOPHIL NFR BLD AUTO: 0 %
ERYTHROCYTE [DISTWIDTH] IN BLOOD BY AUTOMATED COUNT: 15.5 % (ref 11.5–14.5)
FENTANYL+NORFENTANYL UR QL SCN: ABNORMAL
GLUCOSE SERPL-MCNC: 169 MG/DL (ref 74–99)
GLUCOSE UR STRIP.AUTO-MCNC: ABNORMAL MG/DL
HCT VFR BLD AUTO: 48.1 % (ref 41–52)
HGB BLD-MCNC: 15.8 G/DL (ref 13.5–17.5)
HOLD SPECIMEN: NORMAL
HYALINE CASTS #/AREA URNS AUTO: ABNORMAL /LPF
IMM GRANULOCYTES # BLD AUTO: 0.15 X10*3/UL (ref 0–0.7)
IMM GRANULOCYTES NFR BLD AUTO: 0.7 % (ref 0–0.9)
KETONES UR STRIP.AUTO-MCNC: ABNORMAL MG/DL
LEUKOCYTE ESTERASE UR QL STRIP.AUTO: NEGATIVE
LIPASE SERPL-CCNC: 4 U/L (ref 9–82)
LYMPHOCYTES # BLD AUTO: 1.6 X10*3/UL (ref 1.2–4.8)
LYMPHOCYTES NFR BLD AUTO: 7.3 %
MAGNESIUM SERPL-MCNC: 1.84 MG/DL (ref 1.6–2.4)
MCH RBC QN AUTO: 27.6 PG (ref 26–34)
MCHC RBC AUTO-ENTMCNC: 32.8 G/DL (ref 32–36)
MCV RBC AUTO: 84 FL (ref 80–100)
METHADONE UR QL SCN: ABNORMAL
MONOCYTES # BLD AUTO: 1.8 X10*3/UL (ref 0.1–1)
MONOCYTES NFR BLD AUTO: 8.2 %
MUCOUS THREADS #/AREA URNS AUTO: ABNORMAL /LPF
NEUTROPHILS # BLD AUTO: 18.25 X10*3/UL (ref 1.2–7.7)
NEUTROPHILS NFR BLD AUTO: 83.7 %
NITRITE UR QL STRIP.AUTO: NEGATIVE
NRBC BLD-RTO: 0 /100 WBCS (ref 0–0)
OPIATES UR QL SCN: ABNORMAL
OXYCODONE+OXYMORPHONE UR QL SCN: ABNORMAL
PCP UR QL SCN: ABNORMAL
PH UR STRIP.AUTO: 5.5 [PH]
PLATELET # BLD AUTO: 293 X10*3/UL (ref 150–450)
POTASSIUM SERPL-SCNC: 3.8 MMOL/L (ref 3.5–5.3)
PROT SERPL-MCNC: 7.4 G/DL (ref 6.4–8.2)
PROT UR STRIP.AUTO-MCNC: ABNORMAL MG/DL
RBC # BLD AUTO: 5.73 X10*6/UL (ref 4.5–5.9)
RBC # UR STRIP.AUTO: ABNORMAL MG/DL
RBC #/AREA URNS AUTO: ABNORMAL /HPF
SODIUM SERPL-SCNC: 134 MMOL/L (ref 136–145)
SP GR UR STRIP.AUTO: 1.04
UROBILINOGEN UR STRIP.AUTO-MCNC: NORMAL MG/DL
WBC # BLD AUTO: 21.8 X10*3/UL (ref 4.4–11.3)
WBC #/AREA URNS AUTO: ABNORMAL /HPF

## 2025-02-18 PROCEDURE — 2500000004 HC RX 250 GENERAL PHARMACY W/ HCPCS (ALT 636 FOR OP/ED): Performed by: STUDENT IN AN ORGANIZED HEALTH CARE EDUCATION/TRAINING PROGRAM

## 2025-02-18 PROCEDURE — 99223 1ST HOSP IP/OBS HIGH 75: CPT | Performed by: NURSE PRACTITIONER

## 2025-02-18 PROCEDURE — 83735 ASSAY OF MAGNESIUM: CPT | Performed by: STUDENT IN AN ORGANIZED HEALTH CARE EDUCATION/TRAINING PROGRAM

## 2025-02-18 PROCEDURE — 36415 COLL VENOUS BLD VENIPUNCTURE: CPT | Performed by: STUDENT IN AN ORGANIZED HEALTH CARE EDUCATION/TRAINING PROGRAM

## 2025-02-18 PROCEDURE — 74177 CT ABD & PELVIS W/CONTRAST: CPT

## 2025-02-18 PROCEDURE — 99285 EMERGENCY DEPT VISIT HI MDM: CPT | Mod: 25 | Performed by: STUDENT IN AN ORGANIZED HEALTH CARE EDUCATION/TRAINING PROGRAM

## 2025-02-18 PROCEDURE — 80307 DRUG TEST PRSMV CHEM ANLYZR: CPT | Performed by: NURSE PRACTITIONER

## 2025-02-18 PROCEDURE — 2060000001 HC INTERMEDIATE ICU ROOM DAILY

## 2025-02-18 PROCEDURE — 96375 TX/PRO/DX INJ NEW DRUG ADDON: CPT

## 2025-02-18 PROCEDURE — 81001 URINALYSIS AUTO W/SCOPE: CPT | Performed by: STUDENT IN AN ORGANIZED HEALTH CARE EDUCATION/TRAINING PROGRAM

## 2025-02-18 PROCEDURE — 93005 ELECTROCARDIOGRAM TRACING: CPT

## 2025-02-18 PROCEDURE — 96365 THER/PROPH/DIAG IV INF INIT: CPT

## 2025-02-18 PROCEDURE — 83690 ASSAY OF LIPASE: CPT | Performed by: STUDENT IN AN ORGANIZED HEALTH CARE EDUCATION/TRAINING PROGRAM

## 2025-02-18 PROCEDURE — 2550000001 HC RX 255 CONTRASTS: Performed by: STUDENT IN AN ORGANIZED HEALTH CARE EDUCATION/TRAINING PROGRAM

## 2025-02-18 PROCEDURE — 84484 ASSAY OF TROPONIN QUANT: CPT | Performed by: STUDENT IN AN ORGANIZED HEALTH CARE EDUCATION/TRAINING PROGRAM

## 2025-02-18 PROCEDURE — 80053 COMPREHEN METABOLIC PANEL: CPT | Performed by: STUDENT IN AN ORGANIZED HEALTH CARE EDUCATION/TRAINING PROGRAM

## 2025-02-18 PROCEDURE — 2500000004 HC RX 250 GENERAL PHARMACY W/ HCPCS (ALT 636 FOR OP/ED): Performed by: NURSE PRACTITIONER

## 2025-02-18 PROCEDURE — 85025 COMPLETE CBC W/AUTO DIFF WBC: CPT | Performed by: STUDENT IN AN ORGANIZED HEALTH CARE EDUCATION/TRAINING PROGRAM

## 2025-02-18 PROCEDURE — 2500000004 HC RX 250 GENERAL PHARMACY W/ HCPCS (ALT 636 FOR OP/ED): Performed by: SURGERY

## 2025-02-18 RX ORDER — ONDANSETRON HYDROCHLORIDE 2 MG/ML
4 INJECTION, SOLUTION INTRAVENOUS EVERY 8 HOURS PRN
Status: DISCONTINUED | OUTPATIENT
Start: 2025-02-18 | End: 2025-02-25 | Stop reason: HOSPADM

## 2025-02-18 RX ORDER — GUAIFENESIN 600 MG/1
600 TABLET, EXTENDED RELEASE ORAL EVERY 12 HOURS PRN
Status: DISCONTINUED | OUTPATIENT
Start: 2025-02-18 | End: 2025-02-25 | Stop reason: HOSPADM

## 2025-02-18 RX ORDER — SODIUM CHLORIDE, SODIUM LACTATE, POTASSIUM CHLORIDE, CALCIUM CHLORIDE 600; 310; 30; 20 MG/100ML; MG/100ML; MG/100ML; MG/100ML
125 INJECTION, SOLUTION INTRAVENOUS CONTINUOUS
Status: ACTIVE | OUTPATIENT
Start: 2025-02-18 | End: 2025-02-19

## 2025-02-18 RX ORDER — POLYETHYLENE GLYCOL 3350 17 G/17G
17 POWDER, FOR SOLUTION ORAL DAILY
Status: DISCONTINUED | OUTPATIENT
Start: 2025-02-18 | End: 2025-02-25 | Stop reason: HOSPADM

## 2025-02-18 RX ORDER — BISACODYL 5 MG
10 TABLET, DELAYED RELEASE (ENTERIC COATED) ORAL DAILY PRN
Status: DISCONTINUED | OUTPATIENT
Start: 2025-02-18 | End: 2025-02-25 | Stop reason: HOSPADM

## 2025-02-18 RX ORDER — TALC
3 POWDER (GRAM) TOPICAL NIGHTLY PRN
Status: DISCONTINUED | OUTPATIENT
Start: 2025-02-18 | End: 2025-02-25 | Stop reason: HOSPADM

## 2025-02-18 RX ORDER — MORPHINE SULFATE 4 MG/ML
4 INJECTION INTRAVENOUS ONCE
Status: COMPLETED | OUTPATIENT
Start: 2025-02-18 | End: 2025-02-18

## 2025-02-18 RX ORDER — BISACODYL 10 MG/1
10 SUPPOSITORY RECTAL DAILY PRN
Status: DISCONTINUED | OUTPATIENT
Start: 2025-02-18 | End: 2025-02-25 | Stop reason: HOSPADM

## 2025-02-18 RX ORDER — MORPHINE SULFATE 4 MG/ML
4 INJECTION INTRAVENOUS EVERY 4 HOURS PRN
Status: DISCONTINUED | OUTPATIENT
Start: 2025-02-18 | End: 2025-02-23

## 2025-02-18 RX ORDER — PANTOPRAZOLE SODIUM 40 MG/1
40 TABLET, DELAYED RELEASE ORAL
Status: DISCONTINUED | OUTPATIENT
Start: 2025-02-19 | End: 2025-02-25 | Stop reason: HOSPADM

## 2025-02-18 RX ORDER — CHOLECALCIFEROL (VITAMIN D3) 25 MCG
4000 TABLET ORAL DAILY
Status: DISCONTINUED | OUTPATIENT
Start: 2025-02-18 | End: 2025-02-25 | Stop reason: HOSPADM

## 2025-02-18 RX ORDER — ASPIRIN 81 MG/1
81 TABLET ORAL DAILY
Status: DISCONTINUED | OUTPATIENT
Start: 2025-02-18 | End: 2025-02-25 | Stop reason: HOSPADM

## 2025-02-18 RX ORDER — METOPROLOL SUCCINATE 25 MG/1
25 TABLET, EXTENDED RELEASE ORAL DAILY
Status: DISCONTINUED | OUTPATIENT
Start: 2025-02-18 | End: 2025-02-21

## 2025-02-18 RX ORDER — HEPARIN SODIUM 5000 [USP'U]/ML
5000 INJECTION, SOLUTION INTRAVENOUS; SUBCUTANEOUS EVERY 8 HOURS
Status: DISCONTINUED | OUTPATIENT
Start: 2025-02-18 | End: 2025-02-25 | Stop reason: HOSPADM

## 2025-02-18 RX ORDER — CHOLECALCIFEROL (VITAMIN D3) 50 MCG
100 TABLET ORAL DAILY
COMMUNITY

## 2025-02-18 RX ORDER — PANTOPRAZOLE SODIUM 40 MG/10ML
40 INJECTION, POWDER, LYOPHILIZED, FOR SOLUTION INTRAVENOUS
Status: DISCONTINUED | OUTPATIENT
Start: 2025-02-19 | End: 2025-02-25 | Stop reason: HOSPADM

## 2025-02-18 RX ORDER — ONDANSETRON 4 MG/1
4 TABLET, ORALLY DISINTEGRATING ORAL EVERY 8 HOURS PRN
Status: DISCONTINUED | OUTPATIENT
Start: 2025-02-18 | End: 2025-02-25 | Stop reason: HOSPADM

## 2025-02-18 RX ORDER — SACUBITRIL AND VALSARTAN 24; 26 MG/1; MG/1
1 TABLET, FILM COATED ORAL 2 TIMES DAILY
Status: DISCONTINUED | OUTPATIENT
Start: 2025-02-18 | End: 2025-02-25 | Stop reason: HOSPADM

## 2025-02-18 RX ADMIN — SODIUM CHLORIDE, POTASSIUM CHLORIDE, SODIUM LACTATE AND CALCIUM CHLORIDE 1000 ML: 600; 310; 30; 20 INJECTION, SOLUTION INTRAVENOUS at 14:21

## 2025-02-18 RX ADMIN — IOHEXOL 75 ML: 350 INJECTION, SOLUTION INTRAVENOUS at 10:03

## 2025-02-18 RX ADMIN — PIPERACILLIN SODIUM AND TAZOBACTAM SODIUM 3.38 G: 3; .375 INJECTION, SOLUTION INTRAVENOUS at 17:25

## 2025-02-18 RX ADMIN — PIPERACILLIN SODIUM AND TAZOBACTAM SODIUM 3.38 G: 3; .375 INJECTION, SOLUTION INTRAVENOUS at 11:28

## 2025-02-18 RX ADMIN — MORPHINE SULFATE 4 MG: 4 INJECTION, SOLUTION INTRAMUSCULAR; INTRAVENOUS at 09:19

## 2025-02-18 RX ADMIN — SODIUM CHLORIDE, POTASSIUM CHLORIDE, SODIUM LACTATE AND CALCIUM CHLORIDE 125 ML/HR: 600; 310; 30; 20 INJECTION, SOLUTION INTRAVENOUS at 17:26

## 2025-02-18 RX ADMIN — PIPERACILLIN SODIUM AND TAZOBACTAM SODIUM 3.38 G: 3; .375 INJECTION, SOLUTION INTRAVENOUS at 22:08

## 2025-02-18 RX ADMIN — HEPARIN SODIUM 5000 UNITS: 5000 INJECTION, SOLUTION INTRAVENOUS; SUBCUTANEOUS at 17:25

## 2025-02-18 SDOH — SOCIAL STABILITY: SOCIAL INSECURITY: ARE YOU OR HAVE YOU BEEN THREATENED OR ABUSED PHYSICALLY, EMOTIONALLY, OR SEXUALLY BY ANYONE?: NO

## 2025-02-18 SDOH — ECONOMIC STABILITY: HOUSING INSECURITY: AT ANY TIME IN THE PAST 12 MONTHS, WERE YOU HOMELESS OR LIVING IN A SHELTER (INCLUDING NOW)?: NO

## 2025-02-18 SDOH — ECONOMIC STABILITY: INCOME INSECURITY: IN THE PAST 12 MONTHS HAS THE ELECTRIC, GAS, OIL, OR WATER COMPANY THREATENED TO SHUT OFF SERVICES IN YOUR HOME?: NO

## 2025-02-18 SDOH — ECONOMIC STABILITY: FOOD INSECURITY: WITHIN THE PAST 12 MONTHS, YOU WORRIED THAT YOUR FOOD WOULD RUN OUT BEFORE YOU GOT THE MONEY TO BUY MORE.: NEVER TRUE

## 2025-02-18 SDOH — SOCIAL STABILITY: SOCIAL INSECURITY: WITHIN THE LAST YEAR, HAVE YOU BEEN AFRAID OF YOUR PARTNER OR EX-PARTNER?: NO

## 2025-02-18 SDOH — SOCIAL STABILITY: SOCIAL INSECURITY
WITHIN THE LAST YEAR, HAVE YOU BEEN KICKED, HIT, SLAPPED, OR OTHERWISE PHYSICALLY HURT BY YOUR PARTNER OR EX-PARTNER?: NO

## 2025-02-18 SDOH — HEALTH STABILITY: PHYSICAL HEALTH: ON AVERAGE, HOW MANY MINUTES DO YOU ENGAGE IN EXERCISE AT THIS LEVEL?: 0 MIN

## 2025-02-18 SDOH — SOCIAL STABILITY: SOCIAL INSECURITY: WITHIN THE LAST YEAR, HAVE YOU BEEN HUMILIATED OR EMOTIONALLY ABUSED IN OTHER WAYS BY YOUR PARTNER OR EX-PARTNER?: NO

## 2025-02-18 SDOH — SOCIAL STABILITY: SOCIAL INSECURITY
WITHIN THE LAST YEAR, HAVE YOU BEEN RAPED OR FORCED TO HAVE ANY KIND OF SEXUAL ACTIVITY BY YOUR PARTNER OR EX-PARTNER?: NO

## 2025-02-18 SDOH — ECONOMIC STABILITY: FOOD INSECURITY: WITHIN THE PAST 12 MONTHS, THE FOOD YOU BOUGHT JUST DIDN'T LAST AND YOU DIDN'T HAVE MONEY TO GET MORE.: NEVER TRUE

## 2025-02-18 SDOH — SOCIAL STABILITY: SOCIAL INSECURITY: DO YOU FEEL ANYONE HAS EXPLOITED OR TAKEN ADVANTAGE OF YOU FINANCIALLY OR OF YOUR PERSONAL PROPERTY?: NO

## 2025-02-18 SDOH — HEALTH STABILITY: PHYSICAL HEALTH: ON AVERAGE, HOW MANY DAYS PER WEEK DO YOU ENGAGE IN MODERATE TO STRENUOUS EXERCISE (LIKE A BRISK WALK)?: 0 DAYS

## 2025-02-18 SDOH — ECONOMIC STABILITY: FOOD INSECURITY: HOW HARD IS IT FOR YOU TO PAY FOR THE VERY BASICS LIKE FOOD, HOUSING, MEDICAL CARE, AND HEATING?: NOT VERY HARD

## 2025-02-18 SDOH — ECONOMIC STABILITY: HOUSING INSECURITY: IN THE LAST 12 MONTHS, WAS THERE A TIME WHEN YOU WERE NOT ABLE TO PAY THE MORTGAGE OR RENT ON TIME?: NO

## 2025-02-18 SDOH — SOCIAL STABILITY: SOCIAL INSECURITY: HAVE YOU HAD ANY THOUGHTS OF HARMING ANYONE ELSE?: NO

## 2025-02-18 SDOH — SOCIAL STABILITY: SOCIAL INSECURITY: WERE YOU ABLE TO COMPLETE ALL THE BEHAVIORAL HEALTH SCREENINGS?: YES

## 2025-02-18 SDOH — SOCIAL STABILITY: SOCIAL INSECURITY: HAS ANYONE EVER THREATENED TO HURT YOUR FAMILY OR YOUR PETS?: NO

## 2025-02-18 SDOH — SOCIAL STABILITY: SOCIAL INSECURITY: ARE THERE ANY APPARENT SIGNS OF INJURIES/BEHAVIORS THAT COULD BE RELATED TO ABUSE/NEGLECT?: NO

## 2025-02-18 SDOH — SOCIAL STABILITY: SOCIAL INSECURITY: DOES ANYONE TRY TO KEEP YOU FROM HAVING/CONTACTING OTHER FRIENDS OR DOING THINGS OUTSIDE YOUR HOME?: NO

## 2025-02-18 SDOH — SOCIAL STABILITY: SOCIAL INSECURITY: ABUSE: ADULT

## 2025-02-18 SDOH — ECONOMIC STABILITY: HOUSING INSECURITY: IN THE PAST 12 MONTHS, HOW MANY TIMES HAVE YOU MOVED WHERE YOU WERE LIVING?: 0

## 2025-02-18 SDOH — SOCIAL STABILITY: SOCIAL INSECURITY: HAVE YOU HAD THOUGHTS OF HARMING ANYONE ELSE?: NO

## 2025-02-18 SDOH — SOCIAL STABILITY: SOCIAL INSECURITY: DO YOU FEEL UNSAFE GOING BACK TO THE PLACE WHERE YOU ARE LIVING?: NO

## 2025-02-18 SDOH — ECONOMIC STABILITY: TRANSPORTATION INSECURITY: IN THE PAST 12 MONTHS, HAS LACK OF TRANSPORTATION KEPT YOU FROM MEDICAL APPOINTMENTS OR FROM GETTING MEDICATIONS?: NO

## 2025-02-18 ASSESSMENT — ACTIVITIES OF DAILY LIVING (ADL)
FEEDING YOURSELF: INDEPENDENT
HEARING - LEFT EAR: FUNCTIONAL
HEARING - RIGHT EAR: FUNCTIONAL
WALKS IN HOME: INDEPENDENT
TOILETING: INDEPENDENT
LACK_OF_TRANSPORTATION: NO
GROOMING: INDEPENDENT
BATHING: INDEPENDENT
LACK_OF_TRANSPORTATION: NO
PATIENT'S MEMORY ADEQUATE TO SAFELY COMPLETE DAILY ACTIVITIES?: YES
JUDGMENT_ADEQUATE_SAFELY_COMPLETE_DAILY_ACTIVITIES: YES
DRESSING YOURSELF: INDEPENDENT
ADEQUATE_TO_COMPLETE_ADL: YES

## 2025-02-18 ASSESSMENT — ENCOUNTER SYMPTOMS
CHILLS: 1
CHILLS: 0
CHOKING: 0
DYSURIA: 0
HEADACHES: 0
JOINT SWELLING: 0
SHORTNESS OF BREATH: 0
ABDOMINAL PAIN: 1
FATIGUE: 1
COUGH: 0
ABDOMINAL DISTENTION: 1
NAUSEA: 1
CHEST TIGHTNESS: 0
VOMITING: 0
FEVER: 0

## 2025-02-18 ASSESSMENT — COGNITIVE AND FUNCTIONAL STATUS - GENERAL
TOILETING: A LITTLE
MOVING FROM LYING ON BACK TO SITTING ON SIDE OF FLAT BED WITH BEDRAILS: A LITTLE
EATING MEALS: A LITTLE
HELP NEEDED FOR BATHING: A LITTLE
TURNING FROM BACK TO SIDE WHILE IN FLAT BAD: A LITTLE
PERSONAL GROOMING: A LITTLE
STANDING UP FROM CHAIR USING ARMS: A LITTLE
CLIMB 3 TO 5 STEPS WITH RAILING: A LITTLE
DRESSING REGULAR LOWER BODY CLOTHING: A LITTLE
MOVING TO AND FROM BED TO CHAIR: A LITTLE
PATIENT BASELINE BEDBOUND: NO
DAILY ACTIVITIY SCORE: 18
DRESSING REGULAR UPPER BODY CLOTHING: A LITTLE
MOBILITY SCORE: 18
WALKING IN HOSPITAL ROOM: A LITTLE

## 2025-02-18 ASSESSMENT — PAIN DESCRIPTION - ORIENTATION: ORIENTATION: RIGHT

## 2025-02-18 ASSESSMENT — PAIN DESCRIPTION - PAIN TYPE: TYPE: ACUTE PAIN

## 2025-02-18 ASSESSMENT — LIFESTYLE VARIABLES
AUDIT-C TOTAL SCORE: 0
HOW OFTEN DO YOU HAVE A DRINK CONTAINING ALCOHOL: NEVER
SKIP TO QUESTIONS 9-10: 1
AUDIT-C TOTAL SCORE: 0
HOW OFTEN DO YOU HAVE 6 OR MORE DRINKS ON ONE OCCASION: NEVER
HOW MANY STANDARD DRINKS CONTAINING ALCOHOL DO YOU HAVE ON A TYPICAL DAY: PATIENT DOES NOT DRINK

## 2025-02-18 ASSESSMENT — COLUMBIA-SUICIDE SEVERITY RATING SCALE - C-SSRS
1. IN THE PAST MONTH, HAVE YOU WISHED YOU WERE DEAD OR WISHED YOU COULD GO TO SLEEP AND NOT WAKE UP?: NO
2. HAVE YOU ACTUALLY HAD ANY THOUGHTS OF KILLING YOURSELF?: NO
6. HAVE YOU EVER DONE ANYTHING, STARTED TO DO ANYTHING, OR PREPARED TO DO ANYTHING TO END YOUR LIFE?: NO

## 2025-02-18 ASSESSMENT — PAIN - FUNCTIONAL ASSESSMENT: PAIN_FUNCTIONAL_ASSESSMENT: 0-10

## 2025-02-18 ASSESSMENT — PAIN SCALES - GENERAL: PAINLEVEL_OUTOF10: 8

## 2025-02-18 ASSESSMENT — PAIN DESCRIPTION - FREQUENCY: FREQUENCY: CONSTANT/CONTINUOUS

## 2025-02-18 ASSESSMENT — PAIN DESCRIPTION - DESCRIPTORS
DESCRIPTORS: STABBING
DESCRIPTORS: SHARP

## 2025-02-18 ASSESSMENT — PAIN DESCRIPTION - LOCATION: LOCATION: ABDOMEN

## 2025-02-18 NOTE — PROGRESS NOTES
Occupational Therapy                 Therapy Communication Note    Patient Name: Román Marinelli  MRN: [id number]  Department: [facility] [place] [facility]  [facility]  Today's Date: 2/18/2025     Discipline: Occupational Therapy          Missed Visit Reason: Missed Visit Reason: Patient placed on medical hold (Patient just admitted with abd pain, ? acute cholecystitis. Plan for IR placed percutaneous cholecystostomy tube tomorrow. Will hold eval, reattempt after procedure.)

## 2025-02-18 NOTE — ED TRIAGE NOTES
Pt c/o abd pain and vomiting that started 3 days ago. C/o 7-10/10 RUQ pain that is sharp and constant. Pt also c/o vomiting episodes, producing clear/yellow vomit. Denies other s/sx and concerns. Pt has previous stroke hx with lasting L side deficits, no new neurological symptoms today

## 2025-02-18 NOTE — PROGRESS NOTES
Román Marinelli is a 69 y.o. male admitted for No Principal Problem: There is no principal problem currently on the Problem List. Please update the Problem List and refresh.. Pharmacy reviewed the patient's vtvot-ov-qoybligkc medications and allergies for accuracy.    The list below reflects the PTA list prior to pharmacy medication history. A summary a changes to the PTA medication list has been listed below. Please review each medication in order reconciliation for additional clarification and justification.    Source of information: [facility] and [facility]     Medications added:  Vit d3 2000units - 2 t every day     Medications modified:    Medications to be removed:  Tylenol 325mg   Lipitor 80mg  Isosorbide er 30mg   Milk of magnesia suspension  Melatonin 3mg   Metoprolol succinate 25mg  Entresto 24-26mg     Medications of concern:  [facility] since [facility]23 except vit d 2000units. Discharge paperwork from 11/22/23 shows the medications as start taking.       Prior to Admission Medications   Prescriptions Last Dose Informant Patient Reported? Taking?   acetaminophen (Tylenol) 325 mg tablet   No No   Sig: Take 2 tablets (650 mg) by mouth every 6 hours if needed (pain).   aspirin 81 mg EC tablet   No No   Sig: Take 1 tablet (81 mg) by mouth once daily.   atorvastatin (Lipitor) 80 mg tablet   No No   Sig: Take 1 tablet (80 mg) by mouth once daily at bedtime.   isosorbide mononitrate ER (Imdur) 30 mg 24 hr tablet   No No   Sig: Take 1 tablet (30 mg) by mouth once daily. Do not crush or chew.   magnesium hydroxide (Milk of Magnesia) 2,400 mg/10 mL suspension suspension   No No   Sig: Take 10 mL by mouth once daily as needed for constipation.   melatonin 3 mg tablet   No No   Sig: Take 1 tablet (3 mg) by mouth once daily.   metoprolol succinate XL (Toprol-XL) 25 mg 24 hr tablet   No No   Sig: Take 1 tablet (25 mg) by mouth once daily. Do not crush or chew.    sacubitriL-valsartan (Entresto) 24-26 mg tablet   No No   Sig: Take 1 tablet by mouth 2 times a day.      Facility-Administered Medications: None       TONG BALES

## 2025-02-18 NOTE — CONSULTS
GENERAL SURGERY CONSULTATION NOTE    Román Marinelli   1955   [id number]       Reason For Consult  Acute cholecystitis    History Of Present Illness  Román Marinelli is a 69 y.o. male with history of hypertension, CAD, history of stroke 1 year ago, history of cocaine abuse who presented to the ED due to 3 days of right sided abdominal pain.  Patient reports the pain started suddenly about 3 days ago.  It woke him up.  He reports it has been constant since and slightly worsening.  The last thing he ate was 3 days ago and was salmon patties.  He did have nausea and has been vomiting over the past 3 days.  He reports he stopped vomiting this morning.  He has never had any pain like this before.  He is passing flatus and his last bowel movement was this morning and was harder than normal for him.  He is voiding and denying any urinary symptoms.  He denies any blood thinners or antiplatelet use.  He reports he does use cocaine and last used it last week, denies any recent use over the last few days.  Denies fevers, chills, chest pain, shortness of breath, or urinary symptoms.     Past Medical History  Past Medical History:   Diagnosis Date    Heart attack 2020    Hypertension     Smoking     never    Stroke (Multi) 2024       Surgical History  History reviewed. No pertinent surgical history.    Medications  No current facility-administered medications on file prior to encounter.     Current Outpatient Medications on File Prior to Encounter   Medication Sig Dispense Refill    acetaminophen (Tylenol) 325 mg tablet Take 2 tablets (650 mg) by mouth every 6 hours if needed (pain). (Patient not taking: Reported on 2/18/2025) 30 tablet 0    aspirin 81 mg EC tablet Take 1 tablet (81 mg) by mouth once daily.      atorvastatin (Lipitor) 80 mg tablet Take 1 tablet (80 mg) by mouth once daily at bedtime. (Patient not taking: Reported on 2/18/2025)      cholecalciferol (Vitamin D-3) 50 MCG (2000 UT) tablet Take 2 tablets (100 mcg)  "by mouth once daily.      isosorbide mononitrate ER (Imdur) 30 mg 24 hr tablet Take 1 tablet (30 mg) by mouth once daily. Do not crush or chew. (Patient not taking: Reported on 2/18/2025)      magnesium hydroxide (Milk of Magnesia) 2,400 mg/10 mL suspension suspension Take 10 mL by mouth once daily as needed for constipation. (Patient not taking: Reported on 2/18/2025)      melatonin 3 mg tablet Take 1 tablet (3 mg) by mouth once daily. (Patient not taking: Reported on 2/18/2025)      metoprolol succinate XL (Toprol-XL) 25 mg 24 hr tablet Take 1 tablet (25 mg) by mouth once daily. Do not crush or chew. (Patient not taking: Reported on 2/18/2025)      sacubitriL-valsartan (Entresto) 24-26 mg tablet Take 1 tablet by mouth 2 times a day. (Patient not taking: Reported on 2/18/2025)         Allergies  Lactose and Amlodipine     Social History  He reports that he has quit smoking. His smoking use included cigarettes. He has never used smokeless tobacco. He reports that he does not use drugs. No history on file for alcohol use.    Family History  No family history on file.     Review of Systems   Constitutional:  Negative for chills and fever.   Respiratory:  Negative for chest tightness and shortness of breath.    Cardiovascular:  Negative for chest pain.   Gastrointestinal:  Positive for abdominal pain and nausea. Negative for vomiting.   Genitourinary:  Negative for dysuria.   Musculoskeletal:  Negative for joint swelling.   Skin:  Negative for rash.   Neurological:  Negative for headaches.       Last Recorded Vitals  Blood pressure (!) 147/100, pulse 98, temperature 37.2 °C (99 °F), temperature source Temporal, resp. rate 17, height 1.753 m (5' 9\"), weight 112 kg (247 lb), SpO2 94%.     Physical Exam  Constitutional:       Comments: Appears uncomfortable   HENT:      Head: Normocephalic.   Eyes:      General: No scleral icterus.     Conjunctiva/sclera: Conjunctivae normal.   Cardiovascular:      Rate and Rhythm: " Regular rhythm. Tachycardia present.      Pulses: Normal pulses.   Pulmonary:      Effort: Pulmonary effort is normal. No respiratory distress.   Abdominal:      General: There is no distension.      Palpations: Abdomen is soft.      Comments: Focally tender in the right upper quadrant with voluntary guarding.  Positive Fragoso.  Otherwise no gross peritoneal signs.   Musculoskeletal:         General: No swelling. Normal range of motion.      Cervical back: Neck supple.   Skin:     General: Skin is warm and dry.   Neurological:      Mental Status: He is alert. Mental status is at baseline.      Comments: Has mild left-sided weakness residual from his stroke          Relevant Results:  Labs:  Results for orders placed or performed during the hospital encounter of 02/18/25 (from the past 24 hours)   ECG 12 lead   Result Value Ref Range    Ventricular Rate 106 BPM    Atrial Rate 106 BPM    WV Interval 168 ms    QRS Duration 96 ms    QT Interval 332 ms    QTC Calculation(Bazett) 441 ms    P Axis 65 degrees    R Axis 6 degrees    T Axis 262 degrees    QRS Count 17 beats    Q Onset 252 ms    T Offset 418 ms    QTC Fredericia 401 ms   CBC and Auto Differential   Result Value Ref Range    WBC 21.8 (H) 4.4 - 11.3 x10*3/uL    nRBC 0.0 0.0 - 0.0 /100 WBCs    RBC 5.73 4.50 - 5.90 x10*6/uL    Hemoglobin 15.8 13.5 - 17.5 g/dL    Hematocrit 48.1 41.0 - 52.0 %    MCV 84 80 - 100 fL    MCH 27.6 26.0 - 34.0 pg    MCHC 32.8 32.0 - 36.0 g/dL    RDW 15.5 (H) 11.5 - 14.5 %    Platelets 293 150 - 450 x10*3/uL    Neutrophils % 83.7 40.0 - 80.0 %    Immature Granulocytes %, Automated 0.7 0.0 - 0.9 %    Lymphocytes % 7.3 13.0 - 44.0 %    Monocytes % 8.2 2.0 - 10.0 %    Eosinophils % 0.0 0.0 - 6.0 %    Basophils % 0.1 0.0 - 2.0 %    Neutrophils Absolute 18.25 (H) 1.20 - 7.70 x10*3/uL    Immature Granulocytes Absolute, Automated 0.15 0.00 - 0.70 x10*3/uL    Lymphocytes Absolute 1.60 1.20 - 4.80 x10*3/uL    Monocytes Absolute 1.80 (H) 0.10 -  1.00 x10*3/uL    Eosinophils Absolute 0.00 0.00 - 0.70 x10*3/uL    Basophils Absolute 0.03 0.00 - 0.10 x10*3/uL   Comprehensive metabolic panel   Result Value Ref Range    Glucose 169 (H) 74 - 99 mg/dL    Sodium 134 (L) 136 - 145 mmol/L    Potassium 3.8 3.5 - 5.3 mmol/L    Chloride 104 98 - 107 mmol/L    Bicarbonate 23 21 - 32 mmol/L    Anion Gap 11 10 - 20 mmol/L    Urea Nitrogen 21 6 - 23 mg/dL    Creatinine 1.43 (H) 0.50 - 1.30 mg/dL    eGFR 54 (L) >60 mL/min/1.73m*2    Calcium 9.9 8.6 - 10.3 mg/dL    Albumin 4.1 3.4 - 5.0 g/dL    Alkaline Phosphatase 83 33 - 136 U/L    Total Protein 7.4 6.4 - 8.2 g/dL    AST 26 9 - 39 U/L    Bilirubin, Total 1.8 (H) 0.0 - 1.2 mg/dL    ALT 35 10 - 52 U/L   Magnesium   Result Value Ref Range    Magnesium 1.84 1.60 - 2.40 mg/dL   Lipase   Result Value Ref Range    Lipase 4 (L) 9 - 82 U/L   Troponin I, High Sensitivity, Initial   Result Value Ref Range    Troponin I, High Sensitivity 69 (HH) 0 - 20 ng/L   Urinalysis with Reflex Culture and Microscopic   Result Value Ref Range    Color, Urine Light-Orange (N) Light-Yellow, Yellow, Dark-Yellow    Appearance, Urine Clear Clear    Specific Gravity, Urine 1.037 (N) 1.005 - 1.035    pH, Urine 5.5 5.0, 5.5, 6.0, 6.5, 7.0, 7.5, 8.0    Protein, Urine 70 (1+) (A) NEGATIVE, 10 (TRACE), 20 (TRACE) mg/dL    Glucose, Urine 30 (TRACE) (A) Normal mg/dL    Blood, Urine 0.03 (TRACE) (A) NEGATIVE mg/dL    Ketones, Urine TRACE (A) NEGATIVE mg/dL    Bilirubin, Urine NEGATIVE NEGATIVE mg/dL    Urobilinogen, Urine Normal Normal mg/dL    Nitrite, Urine NEGATIVE NEGATIVE    Leukocyte Esterase, Urine NEGATIVE NEGATIVE   Urinalysis Microscopic   Result Value Ref Range    WBC, Urine 1-5 1-5, NONE /HPF    RBC, Urine 1-2 NONE, 1-2, 3-5 /HPF    Mucus, Urine 2+ Reference range not established. /LPF    Hyaline Casts, Urine 2+ (A) NONE /LPF   Troponin, High Sensitivity, 1 Hour   Result Value Ref Range    Troponin I, High Sensitivity 67 (HH) 0 - 20 ng/L        Imaging:  CT abdomen pelvis w IV contrast  Narrative: Interpreted By:  Jarrod Strange,   STUDY:  CT ABDOMEN PELVIS W IV CONTRAST;  2/18/2025 10:15 am      INDICATION:  Signs/Symptoms:RLQ pain.      COMPARISON:  None.      ACCESSION NUMBER(S):  [id number]      ORDERING CLINICIAN:  CHARLES MCFARLAND      TECHNIQUE:  Contiguous axial images were obtained through the abdomen and pelvis  following the intravenous administration of 75 cc of Omnipaque 350.  Coronal and sagittal reconstructions were performed.      FINDINGS:  LOWER CHEST:  Images through the lung bases  reveal an area of partial  collapse/consolidation in the right lower lobe. There are additional  areas of atelectasis/scarring in the lungs. Mild patchy ground-glass  opacities also noted.      ABDOMEN AND PELVIS:      LIVER:  Hepatic parenchyma demonstrates a decreased attenuation, compatible  with fatty infiltration.      BILE DUCTS:  Not abnormally dilated.      GALLBLADDER:  Gallbladder demonstrates wall thickening and some pericholecystic  fluid and surrounding inflammation. Cholecystitis is a consideration.      PANCREAS:  Appears unremarkable.      SPLEEN:  Appears unremarkable.      ADRENAL GLANDS:  Appear unremarkable.      KIDNEYS, URETERS, AND BLADDER:  The kidneys enhance with contrast material symmetrically. There is no  evidence of hydronephrosis.  The urinary bladder appears grossly  unremarkable.      BOWEL:  There is colonic diverticulosis. No CT evidence of diverticulitis.  Small bowel loops appear normal in thickness and caliber. There is no  evidence of a bowel obstruction.  Appendix is normal in caliber.  Although CT has limited sensitivity and specificity for gastric  pathology, the stomach appears grossly unremarkable.      RETROPERITONEUM, VESSELS:  There is no aneurysmal dilatation of the abdominal aorta. The IVC is  within normal limits.  There are atherosclerotic calcifications of  the aorta and its branches. No pathologically  enlarged  retroperitoneal lymph nodes are noted.      PERITONEUM:  There is no evidence of pneumoperitoneum.  No ascites or loculated  fluid collection noted.  No pathologically enlarged mesenteric lymph  nodes are identified.      ABDOMINAL WALL, SOFT TISSUES:  Small fat containing left inguinal hernia.      SKELETON:  Degenerative changes. No acute process.      Impression: Distended gallbladder demonstrating wall thickening, pericholecystic  fluid, and surrounding inflammation, suggesting cholecystitis. If  further evaluation is warranted, HIDA scan or ultrasound can be  offered. Partial collapse/consolidation of the right lower lobe.  Nonspecific ground-glass opacities as well as areas of  atelectasis/scarring in the remaining visible portions of the lung  bases. Colonic diverticulosis.  Hepatic steatosis.      MACRO:  None.      Signed by: Jarrod Strange 2/18/2025 10:27 AM  Dictation workstation:   [id number]  ECG 12 lead  Sinus tachycardia  Paired ventricular premature complexes  Left atrial enlargement  Left ventricular hypertrophy  Inferior infarct, age indeterminate  Baseline wander in lead(s) V5      Assessment and Plan  Assessment & Plan  Cholecystitis    69 y.o. male with history of stroke 1 year ago, hypertension, and intermittent cocaine use (last use last week), presenting with 3 days of right upper quadrant abdominal pain.  Patient has leukocytosis of 21 with CT of the abdomen and pelvis demonstrating signs concerning for acute cholecystitis.  Given patient's symptoms being present for past 3 days, likely mild inflammation will increase risk of surgery, will recommend IR percutaneous cholecystostomy tube placement at this time.    Plan:  - Admit to IMS with plan for IR placed percutaneous cholecystostomy tube  - IR consult placed, no radiologist present today, will plan for placement tomorrow  - Continue IV antibiotics: Zosyn  - Pain control and antinausea medication  - N.p.o., IVF, will give 1 L bolus  now and then start maintenance IVF  - Surgery will continue to follow    Discussed with attending Dr. Helio Payne, DO - PGY4  General Surgery

## 2025-02-18 NOTE — H&P
History Obtained From: the patient and chart     History Of Present Illness:  Román Marinelli is a 69 y.o. male with PMHx s/f cocaine use, coronary artery disease, cardiomyopathy, diabetes type 2, hyperlipidemia presenting with abdominal pain fatigue malaise.  Patient came to hospital Red eye right upper quadrant abdominal pain ongoing for about 3 days.  The pain is becoming progressively worse could not take it anymore.  Patient has also had some vomiting associated with the pain.  He is denying any fevers chills or rigors but has not been eating or drinking very well.  He denies any chest pain cough any sputum production.  Has not had any diarrhea or urinary symptoms.  Emergency department workup demonstrated probable acute cholecystitis General Surgery was consulted patient started on IV Zosyn given IV fluids and hydration.  Case was discussed with the ED provider patient will be admitted length of stay likely to exceed 2 midnights.  Patient will need continued IV antibiotics interventional procedure with radiology to perform cholecystotomy tube and allow time for antibiotics to treat the underlying infection.    ED Course (Summary):   Vitals on presentation: Temperature 99 heart rate 105 respiratory rate 18 blood pressure 166/112 SpO2 95% room air  Labs: Glucose 169 creatinine 1.43 total bilirubin 1.8 initial troponin 60 9 repeat troponin 67 CBC significant for leukocytosis white blood cell count 21.8 platelets 293  Imaging: CT of abdomen and pelvis showing distended gallbladder demonstrating wall thickening pericholecystic fluid nonspecific groundglass opacities and atelectasis and remaining portion of the lung bases  Interventions: Patient given IV fluids started on IV Zosyn ED provider consulted with general surgery will plan for cholecystotomy tube and admission      ED Course:  ED Course as of 02/18/25 1432   Tue Feb 18, 2025   1109 Discussed with surgery resident.  They will come to see the patient. []       ED Course User Index  [DR] Socorro Warner DO         Diagnoses as of 02/18/25 1432   Cholecystitis     Relevant Results  Results for orders placed or performed during the hospital encounter of 02/18/25 (from the past 24 hours)   ECG 12 lead   Result Value Ref Range    Ventricular Rate 106 BPM    Atrial Rate 106 BPM    MS Interval 168 ms    QRS Duration 96 ms    QT Interval 332 ms    QTC Calculation(Bazett) 441 ms    P Axis 65 degrees    R Axis 6 degrees    T Axis 262 degrees    QRS Count 17 beats    Q Onset 252 ms    T Offset 418 ms    QTC Fredericia 401 ms   CBC and Auto Differential   Result Value Ref Range    WBC 21.8 (H) 4.4 - 11.3 x10*3/uL    nRBC 0.0 0.0 - 0.0 /100 WBCs    RBC 5.73 4.50 - 5.90 x10*6/uL    Hemoglobin 15.8 13.5 - 17.5 g/dL    Hematocrit 48.1 41.0 - 52.0 %    MCV 84 80 - 100 fL    MCH 27.6 26.0 - 34.0 pg    MCHC 32.8 32.0 - 36.0 g/dL    RDW 15.5 (H) 11.5 - 14.5 %    Platelets 293 150 - 450 x10*3/uL    Neutrophils % 83.7 40.0 - 80.0 %    Immature Granulocytes %, Automated 0.7 0.0 - 0.9 %    Lymphocytes % 7.3 13.0 - 44.0 %    Monocytes % 8.2 2.0 - 10.0 %    Eosinophils % 0.0 0.0 - 6.0 %    Basophils % 0.1 0.0 - 2.0 %    Neutrophils Absolute 18.25 (H) 1.20 - 7.70 x10*3/uL    Immature Granulocytes Absolute, Automated 0.15 0.00 - 0.70 x10*3/uL    Lymphocytes Absolute 1.60 1.20 - 4.80 x10*3/uL    Monocytes Absolute 1.80 (H) 0.10 - 1.00 x10*3/uL    Eosinophils Absolute 0.00 0.00 - 0.70 x10*3/uL    Basophils Absolute 0.03 0.00 - 0.10 x10*3/uL   Comprehensive metabolic panel   Result Value Ref Range    Glucose 169 (H) 74 - 99 mg/dL    Sodium 134 (L) 136 - 145 mmol/L    Potassium 3.8 3.5 - 5.3 mmol/L    Chloride 104 98 - 107 mmol/L    Bicarbonate 23 21 - 32 mmol/L    Anion Gap 11 10 - 20 mmol/L    Urea Nitrogen 21 6 - 23 mg/dL    Creatinine 1.43 (H) 0.50 - 1.30 mg/dL    eGFR 54 (L) >60 mL/min/1.73m*2    Calcium 9.9 8.6 - 10.3 mg/dL    Albumin 4.1 3.4 - 5.0 g/dL    Alkaline Phosphatase 83 33 - 136 U/L     Total Protein 7.4 6.4 - 8.2 g/dL    AST 26 9 - 39 U/L    Bilirubin, Total 1.8 (H) 0.0 - 1.2 mg/dL    ALT 35 10 - 52 U/L   Magnesium   Result Value Ref Range    Magnesium 1.84 1.60 - 2.40 mg/dL   Lipase   Result Value Ref Range    Lipase 4 (L) 9 - 82 U/L   Troponin I, High Sensitivity, Initial   Result Value Ref Range    Troponin I, High Sensitivity 69 (HH) 0 - 20 ng/L   Urinalysis with Reflex Culture and Microscopic   Result Value Ref Range    Color, Urine Light-Orange (N) Light-Yellow, Yellow, Dark-Yellow    Appearance, Urine Clear Clear    Specific Gravity, Urine 1.037 (N) 1.005 - 1.035    pH, Urine 5.5 5.0, 5.5, 6.0, 6.5, 7.0, 7.5, 8.0    Protein, Urine 70 (1+) (A) NEGATIVE, 10 (TRACE), 20 (TRACE) mg/dL    Glucose, Urine 30 (TRACE) (A) Normal mg/dL    Blood, Urine 0.03 (TRACE) (A) NEGATIVE mg/dL    Ketones, Urine TRACE (A) NEGATIVE mg/dL    Bilirubin, Urine NEGATIVE NEGATIVE mg/dL    Urobilinogen, Urine Normal Normal mg/dL    Nitrite, Urine NEGATIVE NEGATIVE    Leukocyte Esterase, Urine NEGATIVE NEGATIVE   Urinalysis Microscopic   Result Value Ref Range    WBC, Urine 1-5 1-5, NONE /HPF    RBC, Urine 1-2 NONE, 1-2, 3-5 /HPF    Mucus, Urine 2+ Reference range not established. /LPF    Hyaline Casts, Urine 2+ (A) NONE /LPF   Troponin, High Sensitivity, 1 Hour   Result Value Ref Range    Troponin I, High Sensitivity 67 (HH) 0 - 20 ng/L      CT abdomen pelvis w IV contrast    Result Date: 2/18/2025  Interpreted By:  Jarrod Strange, STUDY: CT ABDOMEN PELVIS W IV CONTRAST;  2/18/2025 10:15 am   INDICATION: Signs/Symptoms:RLQ pain.   COMPARISON: None.   ACCESSION NUMBER(S): [id number]   ORDERING CLINICIAN: CHARLES MCFARLAND   TECHNIQUE: Contiguous axial images were obtained through the abdomen and pelvis following the intravenous administration of 75 cc of Omnipaque 350. Coronal and sagittal reconstructions were performed.   FINDINGS: LOWER CHEST: Images through the lung bases  reveal an area of partial  collapse/consolidation in the right lower lobe. There are additional areas of atelectasis/scarring in the lungs. Mild patchy ground-glass opacities also noted.   ABDOMEN AND PELVIS:   LIVER: Hepatic parenchyma demonstrates a decreased attenuation, compatible with fatty infiltration.   BILE DUCTS: Not abnormally dilated.   GALLBLADDER: Gallbladder demonstrates wall thickening and some pericholecystic fluid and surrounding inflammation. Cholecystitis is a consideration.   PANCREAS: Appears unremarkable.   SPLEEN: Appears unremarkable.   ADRENAL GLANDS: Appear unremarkable.   KIDNEYS, URETERS, AND BLADDER: The kidneys enhance with contrast material symmetrically. There is no evidence of hydronephrosis.  The urinary bladder appears grossly unremarkable.   BOWEL: There is colonic diverticulosis. No CT evidence of diverticulitis. Small bowel loops appear normal in thickness and caliber. There is no evidence of a bowel obstruction.  Appendix is normal in caliber. Although CT has limited sensitivity and specificity for gastric pathology, the stomach appears grossly unremarkable.   RETROPERITONEUM, VESSELS: There is no aneurysmal dilatation of the abdominal aorta. The IVC is within normal limits.  There are atherosclerotic calcifications of the aorta and its branches. No pathologically enlarged retroperitoneal lymph nodes are noted.   PERITONEUM: There is no evidence of pneumoperitoneum.  No ascites or loculated fluid collection noted.  No pathologically enlarged mesenteric lymph nodes are identified.   ABDOMINAL WALL, SOFT TISSUES: Small fat containing left inguinal hernia.   SKELETON: Degenerative changes. No acute process.       Distended gallbladder demonstrating wall thickening, pericholecystic fluid, and surrounding inflammation, suggesting cholecystitis. If further evaluation is warranted, HIDA scan or ultrasound can be offered. Partial collapse/consolidation of the right lower lobe. Nonspecific ground-glass opacities  as well as areas of atelectasis/scarring in the remaining visible portions of the lung bases. Colonic diverticulosis. Hepatic steatosis.   MACRO: None.   Signed by: Jarrod Strange 2/18/2025 10:27 AM Dictation workstation:   [id number]    ECG 12 lead    Result Date: 2/18/2025  Sinus tachycardia Paired ventricular premature complexes Left atrial enlargement Left ventricular hypertrophy Inferior infarct, age indeterminate Baseline wander in lead(s) V5     Scheduled medications:  aspirin, 81 mg, oral, Daily  cholecalciferol, 4,000 Units, oral, Daily  heparin (porcine), 5,000 Units, subcutaneous, q8h  lactated Ringer's, 1,000 mL, intravenous, Once  metoprolol succinate XL, 25 mg, oral, Daily  [START ON 2/19/2025] pantoprazole, 40 mg, oral, Daily before breakfast   Or  [START ON 2/19/2025] pantoprazole, 40 mg, intravenous, Daily before breakfast  piperacillin-tazobactam, 3.375 g, intravenous, q6h  polyethylene glycol, 17 g, oral, Daily  [Held by provider] sacubitriL-valsartan, 1 tablet, oral, BID      Continuous medications:  lactated Ringer's, 125 mL/hr      PRN medications:  PRN medications: bisacodyl, bisacodyl, guaiFENesin, melatonin, morphine, ondansetron ODT **OR** ondansetron      Past Medical History  He has a past medical history of Heart attack (2020), Hypertension, Smoking, and Stroke (Multi) (2024).    Surgical History  He has no past surgical history on file.     Social History  He reports that he has quit smoking. His smoking use included cigarettes. He has never used smokeless tobacco. He reports that he does not use drugs. No history on file for alcohol use.    Family History  No family history on file.     Allergies  Lactose and Amlodipine    Code Status  Full Code     Review of Systems   Constitutional:  Positive for chills and fatigue.   Respiratory:  Negative for cough, choking and shortness of breath.    Cardiovascular:  Positive for chest pain. Negative for leg swelling.   Gastrointestinal:  Positive  for abdominal distention, abdominal pain and nausea.       Last Recorded Vitals  BP (!) 147/100   Pulse 98   Temp 37.2 °C (99 °F) (Temporal)   Resp 17   Wt 112 kg (247 lb)   SpO2 94%      Physical Exam  Vitals reviewed.   Constitutional:       General: He is not in acute distress.     Appearance: He is ill-appearing.   HENT:      Head: Normocephalic and atraumatic.      Right Ear: External ear normal.      Left Ear: External ear normal.      Nose: Nose normal.      Mouth/Throat:      Mouth: Mucous membranes are moist.      Pharynx: Oropharynx is clear.   Eyes:      General: No scleral icterus.     Extraocular Movements: Extraocular movements intact.      Conjunctiva/sclera: Conjunctivae normal.      Pupils: Pupils are equal, round, and reactive to light.   Cardiovascular:      Rate and Rhythm: Normal rate and regular rhythm.      Pulses: Normal pulses.      Heart sounds: Normal heart sounds.   Pulmonary:      Effort: Pulmonary effort is normal. No respiratory distress.      Breath sounds: Normal breath sounds. No wheezing, rhonchi or rales.   Chest:      Chest wall: No tenderness.   Abdominal:      General: Bowel sounds are normal. There is no distension.      Palpations: Abdomen is soft. There is no mass.      Tenderness: There is abdominal tenderness. There is no rebound.   Musculoskeletal:         General: No swelling or deformity. Normal range of motion.      Cervical back: Normal range of motion.      Right lower leg: No edema.      Left lower leg: No edema.   Skin:     General: Skin is warm and dry.      Capillary Refill: Capillary refill takes less than 2 seconds.   Neurological:      General: No focal deficit present.      Mental Status: He is alert and oriented to person, place, and time.   Psychiatric:         Mood and Affect: Mood normal.         Behavior: Behavior normal.         Assessment/Plan   Assessment & Plan  Cholecystitis    RUQ pain due to cholecystitis  Continue IV zosyn  Surgery to see,  has consulted IR for cholecystotomy tube  NPO x meds    CAD w elevated troponin  Check UDS given hx cocaine use  Pt not taking meds, will consult cardiology  Continue beta blocker if UDS negative for cocaine    Dehydration due to acute illness  IV hydration, monitor for overload, last ef 35-40%    CMP last ef 35-40 11/2023  Resume entresto      HLD  Continue statin        No new Assessment & Plan notes have been filed under this hospital service since the last note was generated.  Service: Internal Medicine          Clyde Sauceda, APRN-CNP    Dragon dictation software was used to dictate this note and thus there may be minor errors in translation/transcription including garbled speech or misspellings. Please contact for clarification if needed.

## 2025-02-18 NOTE — H&P (VIEW-ONLY)
GENERAL SURGERY CONSULTATION NOTE    Román Marinelli   1955   [id number]       Reason For Consult  Acute cholecystitis    History Of Present Illness  Román Marinelli is a 69 y.o. male with history of hypertension, CAD, history of stroke 1 year ago, history of cocaine abuse who presented to the ED due to 3 days of right sided abdominal pain.  Patient reports the pain started suddenly about 3 days ago.  It woke him up.  He reports it has been constant since and slightly worsening.  The last thing he ate was 3 days ago and was salmon patties.  He did have nausea and has been vomiting over the past 3 days.  He reports he stopped vomiting this morning.  He has never had any pain like this before.  He is passing flatus and his last bowel movement was this morning and was harder than normal for him.  He is voiding and denying any urinary symptoms.  He denies any blood thinners or antiplatelet use.  He reports he does use cocaine and last used it last week, denies any recent use over the last few days.  Denies fevers, chills, chest pain, shortness of breath, or urinary symptoms.     Past Medical History  Past Medical History:   Diagnosis Date    Heart attack 2020    Hypertension     Smoking     never    Stroke (Multi) 2024       Surgical History  History reviewed. No pertinent surgical history.    Medications  No current facility-administered medications on file prior to encounter.     Current Outpatient Medications on File Prior to Encounter   Medication Sig Dispense Refill    acetaminophen (Tylenol) 325 mg tablet Take 2 tablets (650 mg) by mouth every 6 hours if needed (pain). (Patient not taking: Reported on 2/18/2025) 30 tablet 0    aspirin 81 mg EC tablet Take 1 tablet (81 mg) by mouth once daily.      atorvastatin (Lipitor) 80 mg tablet Take 1 tablet (80 mg) by mouth once daily at bedtime. (Patient not taking: Reported on 2/18/2025)      cholecalciferol (Vitamin D-3) 50 MCG (2000 UT) tablet Take 2 tablets (100 mcg)  "by mouth once daily.      isosorbide mononitrate ER (Imdur) 30 mg 24 hr tablet Take 1 tablet (30 mg) by mouth once daily. Do not crush or chew. (Patient not taking: Reported on 2/18/2025)      magnesium hydroxide (Milk of Magnesia) 2,400 mg/10 mL suspension suspension Take 10 mL by mouth once daily as needed for constipation. (Patient not taking: Reported on 2/18/2025)      melatonin 3 mg tablet Take 1 tablet (3 mg) by mouth once daily. (Patient not taking: Reported on 2/18/2025)      metoprolol succinate XL (Toprol-XL) 25 mg 24 hr tablet Take 1 tablet (25 mg) by mouth once daily. Do not crush or chew. (Patient not taking: Reported on 2/18/2025)      sacubitriL-valsartan (Entresto) 24-26 mg tablet Take 1 tablet by mouth 2 times a day. (Patient not taking: Reported on 2/18/2025)         Allergies  Lactose and Amlodipine     Social History  He reports that he has quit smoking. His smoking use included cigarettes. He has never used smokeless tobacco. He reports that he does not use drugs. No history on file for alcohol use.    Family History  No family history on file.     Review of Systems   Constitutional:  Negative for chills and fever.   Respiratory:  Negative for chest tightness and shortness of breath.    Cardiovascular:  Negative for chest pain.   Gastrointestinal:  Positive for abdominal pain and nausea. Negative for vomiting.   Genitourinary:  Negative for dysuria.   Musculoskeletal:  Negative for joint swelling.   Skin:  Negative for rash.   Neurological:  Negative for headaches.       Last Recorded Vitals  Blood pressure (!) 147/100, pulse 98, temperature 37.2 °C (99 °F), temperature source Temporal, resp. rate 17, height 1.753 m (5' 9\"), weight 112 kg (247 lb), SpO2 94%.     Physical Exam  Constitutional:       Comments: Appears uncomfortable   HENT:      Head: Normocephalic.   Eyes:      General: No scleral icterus.     Conjunctiva/sclera: Conjunctivae normal.   Cardiovascular:      Rate and Rhythm: " Regular rhythm. Tachycardia present.      Pulses: Normal pulses.   Pulmonary:      Effort: Pulmonary effort is normal. No respiratory distress.   Abdominal:      General: There is no distension.      Palpations: Abdomen is soft.      Comments: Focally tender in the right upper quadrant with voluntary guarding.  Positive Fragoso.  Otherwise no gross peritoneal signs.   Musculoskeletal:         General: No swelling. Normal range of motion.      Cervical back: Neck supple.   Skin:     General: Skin is warm and dry.   Neurological:      Mental Status: He is alert. Mental status is at baseline.      Comments: Has mild left-sided weakness residual from his stroke          Relevant Results:  Labs:  Results for orders placed or performed during the hospital encounter of 02/18/25 (from the past 24 hours)   ECG 12 lead   Result Value Ref Range    Ventricular Rate 106 BPM    Atrial Rate 106 BPM    TN Interval 168 ms    QRS Duration 96 ms    QT Interval 332 ms    QTC Calculation(Bazett) 441 ms    P Axis 65 degrees    R Axis 6 degrees    T Axis 262 degrees    QRS Count 17 beats    Q Onset 252 ms    T Offset 418 ms    QTC Fredericia 401 ms   CBC and Auto Differential   Result Value Ref Range    WBC 21.8 (H) 4.4 - 11.3 x10*3/uL    nRBC 0.0 0.0 - 0.0 /100 WBCs    RBC 5.73 4.50 - 5.90 x10*6/uL    Hemoglobin 15.8 13.5 - 17.5 g/dL    Hematocrit 48.1 41.0 - 52.0 %    MCV 84 80 - 100 fL    MCH 27.6 26.0 - 34.0 pg    MCHC 32.8 32.0 - 36.0 g/dL    RDW 15.5 (H) 11.5 - 14.5 %    Platelets 293 150 - 450 x10*3/uL    Neutrophils % 83.7 40.0 - 80.0 %    Immature Granulocytes %, Automated 0.7 0.0 - 0.9 %    Lymphocytes % 7.3 13.0 - 44.0 %    Monocytes % 8.2 2.0 - 10.0 %    Eosinophils % 0.0 0.0 - 6.0 %    Basophils % 0.1 0.0 - 2.0 %    Neutrophils Absolute 18.25 (H) 1.20 - 7.70 x10*3/uL    Immature Granulocytes Absolute, Automated 0.15 0.00 - 0.70 x10*3/uL    Lymphocytes Absolute 1.60 1.20 - 4.80 x10*3/uL    Monocytes Absolute 1.80 (H) 0.10 -  1.00 x10*3/uL    Eosinophils Absolute 0.00 0.00 - 0.70 x10*3/uL    Basophils Absolute 0.03 0.00 - 0.10 x10*3/uL   Comprehensive metabolic panel   Result Value Ref Range    Glucose 169 (H) 74 - 99 mg/dL    Sodium 134 (L) 136 - 145 mmol/L    Potassium 3.8 3.5 - 5.3 mmol/L    Chloride 104 98 - 107 mmol/L    Bicarbonate 23 21 - 32 mmol/L    Anion Gap 11 10 - 20 mmol/L    Urea Nitrogen 21 6 - 23 mg/dL    Creatinine 1.43 (H) 0.50 - 1.30 mg/dL    eGFR 54 (L) >60 mL/min/1.73m*2    Calcium 9.9 8.6 - 10.3 mg/dL    Albumin 4.1 3.4 - 5.0 g/dL    Alkaline Phosphatase 83 33 - 136 U/L    Total Protein 7.4 6.4 - 8.2 g/dL    AST 26 9 - 39 U/L    Bilirubin, Total 1.8 (H) 0.0 - 1.2 mg/dL    ALT 35 10 - 52 U/L   Magnesium   Result Value Ref Range    Magnesium 1.84 1.60 - 2.40 mg/dL   Lipase   Result Value Ref Range    Lipase 4 (L) 9 - 82 U/L   Troponin I, High Sensitivity, Initial   Result Value Ref Range    Troponin I, High Sensitivity 69 (HH) 0 - 20 ng/L   Urinalysis with Reflex Culture and Microscopic   Result Value Ref Range    Color, Urine Light-Orange (N) Light-Yellow, Yellow, Dark-Yellow    Appearance, Urine Clear Clear    Specific Gravity, Urine 1.037 (N) 1.005 - 1.035    pH, Urine 5.5 5.0, 5.5, 6.0, 6.5, 7.0, 7.5, 8.0    Protein, Urine 70 (1+) (A) NEGATIVE, 10 (TRACE), 20 (TRACE) mg/dL    Glucose, Urine 30 (TRACE) (A) Normal mg/dL    Blood, Urine 0.03 (TRACE) (A) NEGATIVE mg/dL    Ketones, Urine TRACE (A) NEGATIVE mg/dL    Bilirubin, Urine NEGATIVE NEGATIVE mg/dL    Urobilinogen, Urine Normal Normal mg/dL    Nitrite, Urine NEGATIVE NEGATIVE    Leukocyte Esterase, Urine NEGATIVE NEGATIVE   Urinalysis Microscopic   Result Value Ref Range    WBC, Urine 1-5 1-5, NONE /HPF    RBC, Urine 1-2 NONE, 1-2, 3-5 /HPF    Mucus, Urine 2+ Reference range not established. /LPF    Hyaline Casts, Urine 2+ (A) NONE /LPF   Troponin, High Sensitivity, 1 Hour   Result Value Ref Range    Troponin I, High Sensitivity 67 (HH) 0 - 20 ng/L        Imaging:  CT abdomen pelvis w IV contrast  Narrative: Interpreted By:  Jarrod Strange,   STUDY:  CT ABDOMEN PELVIS W IV CONTRAST;  2/18/2025 10:15 am      INDICATION:  Signs/Symptoms:RLQ pain.      COMPARISON:  None.      ACCESSION NUMBER(S):  [id number]      ORDERING CLINICIAN:  CHARLES MCFARLAND      TECHNIQUE:  Contiguous axial images were obtained through the abdomen and pelvis  following the intravenous administration of 75 cc of Omnipaque 350.  Coronal and sagittal reconstructions were performed.      FINDINGS:  LOWER CHEST:  Images through the lung bases  reveal an area of partial  collapse/consolidation in the right lower lobe. There are additional  areas of atelectasis/scarring in the lungs. Mild patchy ground-glass  opacities also noted.      ABDOMEN AND PELVIS:      LIVER:  Hepatic parenchyma demonstrates a decreased attenuation, compatible  with fatty infiltration.      BILE DUCTS:  Not abnormally dilated.      GALLBLADDER:  Gallbladder demonstrates wall thickening and some pericholecystic  fluid and surrounding inflammation. Cholecystitis is a consideration.      PANCREAS:  Appears unremarkable.      SPLEEN:  Appears unremarkable.      ADRENAL GLANDS:  Appear unremarkable.      KIDNEYS, URETERS, AND BLADDER:  The kidneys enhance with contrast material symmetrically. There is no  evidence of hydronephrosis.  The urinary bladder appears grossly  unremarkable.      BOWEL:  There is colonic diverticulosis. No CT evidence of diverticulitis.  Small bowel loops appear normal in thickness and caliber. There is no  evidence of a bowel obstruction.  Appendix is normal in caliber.  Although CT has limited sensitivity and specificity for gastric  pathology, the stomach appears grossly unremarkable.      RETROPERITONEUM, VESSELS:  There is no aneurysmal dilatation of the abdominal aorta. The IVC is  within normal limits.  There are atherosclerotic calcifications of  the aorta and its branches. No pathologically  enlarged  retroperitoneal lymph nodes are noted.      PERITONEUM:  There is no evidence of pneumoperitoneum.  No ascites or loculated  fluid collection noted.  No pathologically enlarged mesenteric lymph  nodes are identified.      ABDOMINAL WALL, SOFT TISSUES:  Small fat containing left inguinal hernia.      SKELETON:  Degenerative changes. No acute process.      Impression: Distended gallbladder demonstrating wall thickening, pericholecystic  fluid, and surrounding inflammation, suggesting cholecystitis. If  further evaluation is warranted, HIDA scan or ultrasound can be  offered. Partial collapse/consolidation of the right lower lobe.  Nonspecific ground-glass opacities as well as areas of  atelectasis/scarring in the remaining visible portions of the lung  bases. Colonic diverticulosis.  Hepatic steatosis.      MACRO:  None.      Signed by: Jarrod Strange 2/18/2025 10:27 AM  Dictation workstation:   [id number]  ECG 12 lead  Sinus tachycardia  Paired ventricular premature complexes  Left atrial enlargement  Left ventricular hypertrophy  Inferior infarct, age indeterminate  Baseline wander in lead(s) V5      Assessment and Plan  Assessment & Plan  Cholecystitis    69 y.o. male with history of stroke 1 year ago, hypertension, and intermittent cocaine use (last use last week), presenting with 3 days of right upper quadrant abdominal pain.  Patient has leukocytosis of 21 with CT of the abdomen and pelvis demonstrating signs concerning for acute cholecystitis.  Given patient's symptoms being present for past 3 days, likely mild inflammation will increase risk of surgery, will recommend IR percutaneous cholecystostomy tube placement at this time.    Plan:  - Admit to [facility] with plan for IR placed percutaneous cholecystostomy tube  - IR consult placed, no radiologist present today, will plan for placement tomorrow  - Continue IV antibiotics: Zosyn  - Pain control and antinausea medication  - N.p.o., IVF, will give 1 L bolus  now and then start maintenance IVF  - Surgery will continue to follow    Discussed with attending Dr. Helio Payne, DO - PGY4  General Surgery

## 2025-02-19 ENCOUNTER — APPOINTMENT (OUTPATIENT)
Dept: CARDIOLOGY | Facility: HOSPITAL | Age: 70
DRG: 418 | End: 2025-02-19
Payer: OTHER GOVERNMENT

## 2025-02-19 PROBLEM — E86.0 DEHYDRATION: Status: ACTIVE | Noted: 2025-02-19

## 2025-02-19 PROBLEM — I50.22 CHRONIC SYSTOLIC HEART FAILURE: Status: ACTIVE | Noted: 2025-02-19

## 2025-02-19 PROBLEM — I21.4 NSTEMI (NON-ST ELEVATED MYOCARDIAL INFARCTION) (MULTI): Status: ACTIVE | Noted: 2019-11-27

## 2025-02-19 PROBLEM — E11.9 DIABETES MELLITUS (MULTI): Status: ACTIVE | Noted: 2025-02-19

## 2025-02-19 PROBLEM — F14.20 COCAINE DEPENDENCE: Status: ACTIVE | Noted: 2025-02-19

## 2025-02-19 PROBLEM — E66.812 OBESITY, CLASS II, BMI 35-39.9: Status: ACTIVE | Noted: 2021-03-07

## 2025-02-19 LAB
AMPHETAMINES UR QL SCN: ABNORMAL
ANION GAP SERPL CALC-SCNC: 12 MMOL/L (ref 10–20)
ATRIAL RATE: 106 BPM
BARBITURATES UR QL SCN: ABNORMAL
BENZODIAZ UR QL SCN: ABNORMAL
BUN SERPL-MCNC: 17 MG/DL (ref 6–23)
BZE UR QL SCN: ABNORMAL
CALCIUM SERPL-MCNC: 8.8 MG/DL (ref 8.6–10.3)
CANNABINOIDS UR QL SCN: ABNORMAL
CHLORIDE SERPL-SCNC: 104 MMOL/L (ref 98–107)
CO2 SERPL-SCNC: 27 MMOL/L (ref 21–32)
CREAT SERPL-MCNC: 1.47 MG/DL (ref 0.5–1.3)
EGFRCR SERPLBLD CKD-EPI 2021: 52 ML/MIN/1.73M*2
ERYTHROCYTE [DISTWIDTH] IN BLOOD BY AUTOMATED COUNT: 15.4 % (ref 11.5–14.5)
FENTANYL+NORFENTANYL UR QL SCN: ABNORMAL
GLUCOSE BLD MANUAL STRIP-MCNC: 103 MG/DL (ref 74–99)
GLUCOSE BLD MANUAL STRIP-MCNC: 104 MG/DL (ref 74–99)
GLUCOSE SERPL-MCNC: 117 MG/DL (ref 74–99)
HCT VFR BLD AUTO: 38.2 % (ref 41–52)
HGB BLD-MCNC: 12.6 G/DL (ref 13.5–17.5)
MCH RBC QN AUTO: 28.3 PG (ref 26–34)
MCHC RBC AUTO-ENTMCNC: 33 G/DL (ref 32–36)
MCV RBC AUTO: 86 FL (ref 80–100)
METHADONE UR QL SCN: ABNORMAL
NRBC BLD-RTO: 0 /100 WBCS (ref 0–0)
OPIATES UR QL SCN: ABNORMAL
OXYCODONE+OXYMORPHONE UR QL SCN: ABNORMAL
P AXIS: 65 DEGREES
PCP UR QL SCN: ABNORMAL
PLATELET # BLD AUTO: 223 X10*3/UL (ref 150–450)
POTASSIUM SERPL-SCNC: 4.1 MMOL/L (ref 3.5–5.3)
PR INTERVAL: 168 MS
Q ONSET: 252 MS
QRS COUNT: 17 BEATS
QRS DURATION: 96 MS
QT INTERVAL: 332 MS
QTC CALCULATION(BAZETT): 441 MS
QTC FREDERICIA: 401 MS
R AXIS: 6 DEGREES
RBC # BLD AUTO: 4.46 X10*6/UL (ref 4.5–5.9)
SODIUM SERPL-SCNC: 139 MMOL/L (ref 136–145)
T AXIS: 262 DEGREES
T OFFSET: 418 MS
VENTRICULAR RATE: 106 BPM
WBC # BLD AUTO: 20.1 X10*3/UL (ref 4.4–11.3)

## 2025-02-19 PROCEDURE — 80307 DRUG TEST PRSMV CHEM ANLYZR: CPT | Performed by: SURGERY

## 2025-02-19 PROCEDURE — 99233 SBSQ HOSP IP/OBS HIGH 50: CPT | Performed by: INTERNAL MEDICINE

## 2025-02-19 PROCEDURE — 36415 COLL VENOUS BLD VENIPUNCTURE: CPT | Performed by: SURGERY

## 2025-02-19 PROCEDURE — 2060000001 HC INTERMEDIATE ICU ROOM DAILY

## 2025-02-19 PROCEDURE — 80048 BASIC METABOLIC PNL TOTAL CA: CPT | Performed by: NURSE PRACTITIONER

## 2025-02-19 PROCEDURE — 97165 OT EVAL LOW COMPLEX 30 MIN: CPT | Mod: GO

## 2025-02-19 PROCEDURE — 2500000004 HC RX 250 GENERAL PHARMACY W/ HCPCS (ALT 636 FOR OP/ED): Performed by: SURGERY

## 2025-02-19 PROCEDURE — 99222 1ST HOSP IP/OBS MODERATE 55: CPT | Performed by: INTERNAL MEDICINE

## 2025-02-19 PROCEDURE — 97161 PT EVAL LOW COMPLEX 20 MIN: CPT | Mod: GP

## 2025-02-19 PROCEDURE — C1729 CATH, DRAINAGE: HCPCS

## 2025-02-19 PROCEDURE — 2500000004 HC RX 250 GENERAL PHARMACY W/ HCPCS (ALT 636 FOR OP/ED): Performed by: NURSE PRACTITIONER

## 2025-02-19 PROCEDURE — 2500000001 HC RX 250 WO HCPCS SELF ADMINISTERED DRUGS (ALT 637 FOR MEDICARE OP): Performed by: NURSE PRACTITIONER

## 2025-02-19 PROCEDURE — 85027 COMPLETE CBC AUTOMATED: CPT | Performed by: SURGERY

## 2025-02-19 PROCEDURE — 82947 ASSAY GLUCOSE BLOOD QUANT: CPT

## 2025-02-19 PROCEDURE — 2720000007 HC OR 272 NO HCPCS

## 2025-02-19 PROCEDURE — 36415 COLL VENOUS BLD VENIPUNCTURE: CPT | Performed by: NURSE PRACTITIONER

## 2025-02-19 RX ORDER — DEXTROSE 50 % IN WATER (D50W) INTRAVENOUS SYRINGE
12.5
Status: DISCONTINUED | OUTPATIENT
Start: 2025-02-19 | End: 2025-02-25 | Stop reason: HOSPADM

## 2025-02-19 RX ORDER — INSULIN LISPRO 100 [IU]/ML
0-5 INJECTION, SOLUTION INTRAVENOUS; SUBCUTANEOUS
Status: DISCONTINUED | OUTPATIENT
Start: 2025-02-19 | End: 2025-02-25 | Stop reason: HOSPADM

## 2025-02-19 RX ORDER — DEXTROSE 50 % IN WATER (D50W) INTRAVENOUS SYRINGE
25
Status: DISCONTINUED | OUTPATIENT
Start: 2025-02-19 | End: 2025-02-25 | Stop reason: HOSPADM

## 2025-02-19 RX ADMIN — HEPARIN SODIUM 5000 UNITS: 5000 INJECTION, SOLUTION INTRAVENOUS; SUBCUTANEOUS at 09:47

## 2025-02-19 RX ADMIN — SODIUM CHLORIDE, POTASSIUM CHLORIDE, SODIUM LACTATE AND CALCIUM CHLORIDE 125 ML/HR: 600; 310; 30; 20 INJECTION, SOLUTION INTRAVENOUS at 01:49

## 2025-02-19 RX ADMIN — PANTOPRAZOLE SODIUM 40 MG: 40 INJECTION, POWDER, FOR SOLUTION INTRAVENOUS at 04:00

## 2025-02-19 RX ADMIN — ASPIRIN 81 MG: 81 TABLET, COATED ORAL at 11:17

## 2025-02-19 RX ADMIN — MORPHINE SULFATE 4 MG: 4 INJECTION, SOLUTION INTRAMUSCULAR; INTRAVENOUS at 09:46

## 2025-02-19 RX ADMIN — SODIUM CHLORIDE, POTASSIUM CHLORIDE, SODIUM LACTATE AND CALCIUM CHLORIDE 125 ML/HR: 600; 310; 30; 20 INJECTION, SOLUTION INTRAVENOUS at 11:14

## 2025-02-19 RX ADMIN — PIPERACILLIN SODIUM AND TAZOBACTAM SODIUM 3.38 G: 3; .375 INJECTION, SOLUTION INTRAVENOUS at 11:16

## 2025-02-19 RX ADMIN — HEPARIN SODIUM 5000 UNITS: 5000 INJECTION, SOLUTION INTRAVENOUS; SUBCUTANEOUS at 00:22

## 2025-02-19 RX ADMIN — PIPERACILLIN SODIUM AND TAZOBACTAM SODIUM 3.38 G: 3; .375 INJECTION, SOLUTION INTRAVENOUS at 17:43

## 2025-02-19 RX ADMIN — Medication 4000 UNITS: at 11:17

## 2025-02-19 RX ADMIN — HEPARIN SODIUM 5000 UNITS: 5000 INJECTION, SOLUTION INTRAVENOUS; SUBCUTANEOUS at 17:44

## 2025-02-19 RX ADMIN — PIPERACILLIN SODIUM AND TAZOBACTAM SODIUM 3.38 G: 3; .375 INJECTION, SOLUTION INTRAVENOUS at 04:01

## 2025-02-19 RX ADMIN — PIPERACILLIN SODIUM AND TAZOBACTAM SODIUM 3.38 G: 3; .375 INJECTION, SOLUTION INTRAVENOUS at 22:05

## 2025-02-19 RX ADMIN — HEPARIN SODIUM 5000 UNITS: 5000 INJECTION, SOLUTION INTRAVENOUS; SUBCUTANEOUS at 23:13

## 2025-02-19 ASSESSMENT — COGNITIVE AND FUNCTIONAL STATUS - GENERAL
PERSONAL GROOMING: A LITTLE
STANDING UP FROM CHAIR USING ARMS: A LITTLE
DAILY ACTIVITIY SCORE: 15
MOVING FROM LYING ON BACK TO SITTING ON SIDE OF FLAT BED WITH BEDRAILS: A LITTLE
DRESSING REGULAR UPPER BODY CLOTHING: A LITTLE
MOVING TO AND FROM BED TO CHAIR: A LITTLE
DRESSING REGULAR LOWER BODY CLOTHING: A LOT
TOILETING: A LOT
TURNING FROM BACK TO SIDE WHILE IN FLAT BAD: TOTAL
HELP NEEDED FOR BATHING: A LOT
CLIMB 3 TO 5 STEPS WITH RAILING: TOTAL
WALKING IN HOSPITAL ROOM: A LITTLE
MOBILITY SCORE: 14
EATING MEALS: A LITTLE

## 2025-02-19 ASSESSMENT — ENCOUNTER SYMPTOMS
WOUND: 0
WHEEZING: 0
SORE THROAT: 0
CHILLS: 1
BACK PAIN: 0
HEMATURIA: 0
HALLUCINATIONS: 0
DIARRHEA: 0
CHEST TIGHTNESS: 0
CHOKING: 0
DYSURIA: 0
NECK PAIN: 0
AGITATION: 0
LIGHT-HEADEDNESS: 0
WEAKNESS: 0
DIAPHORESIS: 0
CONSTIPATION: 0
CHEST TIGHTNESS: 1
VOMITING: 1
NERVOUS/ANXIOUS: 0
NAUSEA: 0
CONFUSION: 0
DIFFICULTY URINATING: 0
FEVER: 0
ABDOMINAL PAIN: 1
FACIAL SWELLING: 0
FATIGUE: 1
PALPITATIONS: 0
COUGH: 0
ABDOMINAL DISTENTION: 1
CHILLS: 0
SHORTNESS OF BREATH: 0
JOINT SWELLING: 0
NAUSEA: 1

## 2025-02-19 ASSESSMENT — PAIN - FUNCTIONAL ASSESSMENT
PAIN_FUNCTIONAL_ASSESSMENT: 0-10

## 2025-02-19 ASSESSMENT — PAIN SCALES - GENERAL
PAINLEVEL_OUTOF10: 6
PAINLEVEL_OUTOF10: 7

## 2025-02-19 ASSESSMENT — ACTIVITIES OF DAILY LIVING (ADL)
BATHING_ASSISTANCE: MINIMAL
ADL_ASSISTANCE: INDEPENDENT
LACK_OF_TRANSPORTATION: NO
ADL_ASSISTANCE: INDEPENDENT

## 2025-02-19 ASSESSMENT — PAIN DESCRIPTION - DESCRIPTORS: DESCRIPTORS: STABBING

## 2025-02-19 NOTE — ASSESSMENT & PLAN NOTE
RUQ pain due to cholecystitis  Continue IV zosyn  Surgery to see, has consulted IR for cholecystotomy tube  NPO x meds  2/19: IR unable to place due to due to anatomy. GS will watch overnight, if worsens may need to go to OR. Continue antibiotics. Keep NPO.

## 2025-02-19 NOTE — CARE PLAN
The patient's goals for the shift include  staying informed     The clinical goals for the shift include patient will have well managed pain throughout this shift    Over the shift, the patient did not make progress toward the following goals. Barriers to progression include understanding. Recommendations to address these barriers include education.

## 2025-02-19 NOTE — PROGRESS NOTES
"GENERAL SURGERY PROGRESS NOTE    Román Marinelli   1955   [id number]     Román Marinelli is a 69 y.o. male on day 1 of admission presenting with Cholecystitis.    Subjective  No acute events overnight.  Patient reports some improvement in his abdominal pain but it is still present.  Denies nausea.  Feels thirsty.    Review of Systems:  Review of Systems   Constitutional:  Negative for chills and fever.   Respiratory:  Negative for chest tightness and shortness of breath.    Cardiovascular:  Negative for chest pain.   Gastrointestinal:  Positive for abdominal pain. Negative for nausea.   Genitourinary:  Negative for difficulty urinating.   Skin:  Negative for rash.       Objective    Last Recorded Vitals  Blood pressure (!) 158/93, pulse 98, temperature 36.9 °C (98.4 °F), temperature source Temporal, resp. rate 18, height 1.753 m (5' 9\"), weight 109 kg (240 lb 8.4 oz), SpO2 91%.    Intake/Output last 3 Shifts:  I/O last 3 completed shifts:  In: 1097.9 (10.1 mL/kg) [I.V.:1047.9 (9.6 mL/kg); IV Piggyback:50]  Out: 500 (4.6 mL/kg) [Urine:500 (0.1 mL/kg/hr)]  Weight: 109.1 kg     Intake/Output Summary (Last 24 hours) at 2/19/2025 0851  Last data filed at 2/19/2025 0447  Gross per 24 hour   Intake 1097.92 ml   Output 500 ml   Net 597.92 ml       Physical Exam  Constitutional:       General: He is not in acute distress.  HENT:      Head: Normocephalic.   Eyes:      General: No scleral icterus.  Cardiovascular:      Rate and Rhythm: Regular rhythm. Tachycardia present.      Pulses: Normal pulses.   Pulmonary:      Effort: Pulmonary effort is normal. No respiratory distress.   Abdominal:      General: There is no distension.      Palpations: Abdomen is soft.      Comments: Focal tenderness in the right upper quadrant with voluntary guarding.  No peritoneal signs.   Musculoskeletal:         General: No swelling.      Cervical back: Neck supple.   Skin:     General: Skin is warm and dry.   Neurological:      Mental Status: " He is alert. Mental status is at baseline.         Relevant Results  Labs:  Results for orders placed or performed during the hospital encounter of 02/18/25 (from the past 24 hours)   Urinalysis with Reflex Culture and Microscopic   Result Value Ref Range    Color, Urine Light-Orange (N) Light-Yellow, Yellow, Dark-Yellow    Appearance, Urine Clear Clear    Specific Gravity, Urine 1.037 (N) 1.005 - 1.035    pH, Urine 5.5 5.0, 5.5, 6.0, 6.5, 7.0, 7.5, 8.0    Protein, Urine 70 (1+) (A) NEGATIVE, 10 (TRACE), 20 (TRACE) mg/dL    Glucose, Urine 30 (TRACE) (A) Normal mg/dL    Blood, Urine 0.03 (TRACE) (A) NEGATIVE mg/dL    Ketones, Urine TRACE (A) NEGATIVE mg/dL    Bilirubin, Urine NEGATIVE NEGATIVE mg/dL    Urobilinogen, Urine Normal Normal mg/dL    Nitrite, Urine NEGATIVE NEGATIVE    Leukocyte Esterase, Urine NEGATIVE NEGATIVE   Extra Urine Gray Tube   Result Value Ref Range    Extra Tube Hold for add-ons.    Urinalysis Microscopic   Result Value Ref Range    WBC, Urine 1-5 1-5, NONE /HPF    RBC, Urine 1-2 NONE, 1-2, 3-5 /HPF    Mucus, Urine 2+ Reference range not established. /LPF    Hyaline Casts, Urine 2+ (A) NONE /LPF   DRUG SCREEN,URINE   Result Value Ref Range    Amphetamine Screen, Urine Presumptive Negative Presumptive Negative    Barbiturate Screen, Urine Presumptive Negative Presumptive Negative    Benzodiazepines Screen, Urine Presumptive Negative Presumptive Negative    Cannabinoid Screen, Urine Presumptive Negative Presumptive Negative    Cocaine Metabolite Screen, Urine Presumptive Positive (A) Presumptive Negative    Fentanyl Screen, Urine Presumptive Negative Presumptive Negative    Opiate Screen, Urine Presumptive Negative Presumptive Negative    Oxycodone Screen, Urine Presumptive Negative Presumptive Negative    PCP Screen, Urine Presumptive Negative Presumptive Negative    Methadone Screen, Urine Presumptive Negative Presumptive Negative   Troponin, High Sensitivity, 1 Hour   Result Value Ref Range     Troponin I, High Sensitivity 67 (HH) 0 - 20 ng/L   Basic metabolic panel   Result Value Ref Range    Glucose 117 (H) 74 - 99 mg/dL    Sodium 139 136 - 145 mmol/L    Potassium 4.1 3.5 - 5.3 mmol/L    Chloride 104 98 - 107 mmol/L    Bicarbonate 27 21 - 32 mmol/L    Anion Gap 12 10 - 20 mmol/L    Urea Nitrogen 17 6 - 23 mg/dL    Creatinine 1.47 (H) 0.50 - 1.30 mg/dL    eGFR 52 (L) >60 mL/min/1.73m*2    Calcium 8.8 8.6 - 10.3 mg/dL       Images:  CT abdomen pelvis w IV contrast   Final Result   Distended gallbladder demonstrating wall thickening, pericholecystic   fluid, and surrounding inflammation, suggesting cholecystitis. If   further evaluation is warranted, HIDA scan or ultrasound can be   offered. Partial collapse/consolidation of the right lower lobe.   Nonspecific ground-glass opacities as well as areas of   atelectasis/scarring in the remaining visible portions of the lung   bases. Colonic diverticulosis.   Hepatic steatosis.        MACRO:   None.        Signed by: Jarrod Strange 2/18/2025 10:27 AM   Dictation workstation:   [id number]      IR biliary cholecystostomy    (Results Pending)       Assessment and Plan  Assessment & Plan  Cholecystitis    Diabetes mellitus (Multi)    Hyperlipidemia    Hypertension    CAD (coronary artery disease), native coronary artery    Dehydration    Chronic systolic heart failure    69 y.o. male with history of stroke 1 year ago, hypertension, and intermittent cocaine use (last use last week), presenting with 3 days of right upper quadrant abdominal pain with CT imaging demonstrating signs concerning for acute cholecystitis.  Given patient's symptoms being present for send period of time, increase in inflammation will increase risk of surgery, plan for IR percutaneous cholecystostomy tube placement.  Scheduled for this morning.    Plan:  - IR for percutaneous cholecystostomy tube placement  - Continue IV antibiotics: Zosyn  - Pain control and antinausea medication  - Okay for  diet postprocedure    Discussed with attending Dr. Helio Payne, DO - PGY4  General Surgery

## 2025-02-19 NOTE — PROGRESS NOTES
Román Marinelli is a 69 y.o. male on day 1 of admission presenting with Cholecystitis.    Review of Systems   Constitutional:  Positive for chills and fatigue. Negative for diaphoresis and fever.   HENT:  Negative for congestion, facial swelling, sneezing and sore throat.    Respiratory:  Positive for chest tightness. Negative for cough, choking, shortness of breath and wheezing.    Cardiovascular:  Negative for chest pain, palpitations and leg swelling.   Gastrointestinal:  Positive for abdominal distention, abdominal pain, nausea and vomiting. Negative for constipation and diarrhea.   Genitourinary:  Negative for dysuria, hematuria and urgency.   Musculoskeletal:  Negative for back pain, gait problem, joint swelling and neck pain.   Skin:  Negative for rash and wound.   Neurological:  Negative for syncope, weakness and light-headedness.   Psychiatric/Behavioral:  Negative for agitation, confusion and hallucinations. The patient is not nervous/anxious.    All other systems reviewed and are negative.     Subjective   Román Marinelli is a 69 y.o. male with PMHx s/f cocaine use, coronary artery disease, cardiomyopathy, diabetes type 2, hyperlipidemia presenting with abdominal pain fatigue malaise.  Patient came to hospital Red eye right upper quadrant abdominal pain ongoing for about 3 days.  The pain is becoming progressively worse could not take it anymore.  Patient has also had some vomiting associated with the pain.  He is denying any fevers chills or rigors but has not been eating or drinking very well.  He denies any chest pain cough any sputum production.  Has not had any diarrhea or urinary symptoms.  Emergency department workup demonstrated probable acute cholecystitis General Surgery was consulted patient started on IV Zosyn given IV fluids and hydration.  Case was discussed with the ED provider patient will be admitted length of stay likely to exceed 2 midnights.  Patient will need continued IV antibiotics  interventional procedure with radiology to perform cholecystotomy tube and allow time for antibiotics to treat the underlying infection.     ED Course (Summary):   Vitals on presentation: Temperature 99 heart rate 105 respiratory rate 18 blood pressure 166/112 SpO2 95% room air  Labs: Glucose 169 creatinine 1.43 total bilirubin 1.8 initial troponin 60 9 repeat troponin 67 CBC significant for leukocytosis white blood cell count 21.8 platelets 293  Imaging: CT of abdomen and pelvis showing distended gallbladder demonstrating wall thickening pericholecystic fluid nonspecific groundglass opacities and atelectasis and remaining portion of the lung bases  Interventions: Patient given IV fluids started on IV Zosyn ED provider consulted with general surgery will plan for cholecystotomy tube and admission    2/19: Pt seen. Had received pain medication and is groggy. Does not withdrawal from abdominal exam, no peritoneal signs. IR unable to place perc sugey drain due to bowel loop in the way. GS aware. Will watch pt overnight on iv antibiotics. If worsens may need to accept risk and go to OR. Continue iv antibiotics. Will recheck in AM.       Objective     Last Recorded Vitals  /83   Pulse 104   Temp 36.4 °C (97.5 °F)   Resp 16   Wt 109 kg (240 lb 8.4 oz)   SpO2 92%   Intake/Output last 3 Shifts:    Intake/Output Summary (Last 24 hours) at 2/19/2025 0739  Last data filed at 2/19/2025 0447  Gross per 24 hour   Intake 1097.92 ml   Output 500 ml   Net 597.92 ml       Admission Weight  Weight: 112 kg (247 lb) (02/18/25 0811)    Daily Weight  02/19/25 : 109 kg (240 lb 8.4 oz)      Physical Exam  Constitutional:       Appearance: He is ill-appearing.   HENT:      Head: Normocephalic and atraumatic.      Nose: Nose normal. No congestion or rhinorrhea.      Mouth/Throat:      Mouth: Mucous membranes are dry.      Pharynx: Oropharynx is clear.   Eyes:      General: No scleral icterus.     Extraocular Movements: Extraocular  movements intact.      Pupils: Pupils are equal, round, and reactive to light.   Cardiovascular:      Rate and Rhythm: Normal rate and regular rhythm.      Heart sounds: Normal heart sounds. No murmur heard.     No friction rub. No gallop.   Pulmonary:      Effort: Pulmonary effort is normal.      Breath sounds: Normal breath sounds. No wheezing, rhonchi or rales.   Chest:      Chest wall: No tenderness.   Abdominal:      General: There is no distension.      Palpations: Abdomen is soft.      Tenderness: There is abdominal tenderness. There is no guarding or rebound.   Musculoskeletal:         General: No swelling, tenderness or signs of injury. Normal range of motion.      Cervical back: Normal range of motion.   Skin:     General: Skin is warm and dry.      Coloration: Skin is not jaundiced.      Findings: No bruising, erythema or rash.   Neurological:      General: No focal deficit present.      Mental Status: He is alert and oriented to person, place, and time.          Lab Results  Results for orders placed or performed during the hospital encounter of 02/18/25 (from the past 24 hours)   ECG 12 lead   Result Value Ref Range    Ventricular Rate 106 BPM    Atrial Rate 106 BPM    OK Interval 168 ms    QRS Duration 96 ms    QT Interval 332 ms    QTC Calculation(Bazett) 441 ms    P Axis 65 degrees    R Axis 6 degrees    T Axis 262 degrees    QRS Count 17 beats    Q Onset 252 ms    T Offset 418 ms    QTC Fredericia 401 ms   CBC and Auto Differential   Result Value Ref Range    WBC 21.8 (H) 4.4 - 11.3 x10*3/uL    nRBC 0.0 0.0 - 0.0 /100 WBCs    RBC 5.73 4.50 - 5.90 x10*6/uL    Hemoglobin 15.8 13.5 - 17.5 g/dL    Hematocrit 48.1 41.0 - 52.0 %    MCV 84 80 - 100 fL    MCH 27.6 26.0 - 34.0 pg    MCHC 32.8 32.0 - 36.0 g/dL    RDW 15.5 (H) 11.5 - 14.5 %    Platelets 293 150 - 450 x10*3/uL    Neutrophils % 83.7 40.0 - 80.0 %    Immature Granulocytes %, Automated 0.7 0.0 - 0.9 %    Lymphocytes % 7.3 13.0 - 44.0 %     Monocytes % 8.2 2.0 - 10.0 %    Eosinophils % 0.0 0.0 - 6.0 %    Basophils % 0.1 0.0 - 2.0 %    Neutrophils Absolute 18.25 (H) 1.20 - 7.70 x10*3/uL    Immature Granulocytes Absolute, Automated 0.15 0.00 - 0.70 x10*3/uL    Lymphocytes Absolute 1.60 1.20 - 4.80 x10*3/uL    Monocytes Absolute 1.80 (H) 0.10 - 1.00 x10*3/uL    Eosinophils Absolute 0.00 0.00 - 0.70 x10*3/uL    Basophils Absolute 0.03 0.00 - 0.10 x10*3/uL   Comprehensive metabolic panel   Result Value Ref Range    Glucose 169 (H) 74 - 99 mg/dL    Sodium 134 (L) 136 - 145 mmol/L    Potassium 3.8 3.5 - 5.3 mmol/L    Chloride 104 98 - 107 mmol/L    Bicarbonate 23 21 - 32 mmol/L    Anion Gap 11 10 - 20 mmol/L    Urea Nitrogen 21 6 - 23 mg/dL    Creatinine 1.43 (H) 0.50 - 1.30 mg/dL    eGFR 54 (L) >60 mL/min/1.73m*2    Calcium 9.9 8.6 - 10.3 mg/dL    Albumin 4.1 3.4 - 5.0 g/dL    Alkaline Phosphatase 83 33 - 136 U/L    Total Protein 7.4 6.4 - 8.2 g/dL    AST 26 9 - 39 U/L    Bilirubin, Total 1.8 (H) 0.0 - 1.2 mg/dL    ALT 35 10 - 52 U/L   Magnesium   Result Value Ref Range    Magnesium 1.84 1.60 - 2.40 mg/dL   Lipase   Result Value Ref Range    Lipase 4 (L) 9 - 82 U/L   Troponin I, High Sensitivity, Initial   Result Value Ref Range    Troponin I, High Sensitivity 69 (HH) 0 - 20 ng/L   Urinalysis with Reflex Culture and Microscopic   Result Value Ref Range    Color, Urine Light-Orange (N) Light-Yellow, Yellow, Dark-Yellow    Appearance, Urine Clear Clear    Specific Gravity, Urine 1.037 (N) 1.005 - 1.035    pH, Urine 5.5 5.0, 5.5, 6.0, 6.5, 7.0, 7.5, 8.0    Protein, Urine 70 (1+) (A) NEGATIVE, 10 (TRACE), 20 (TRACE) mg/dL    Glucose, Urine 30 (TRACE) (A) Normal mg/dL    Blood, Urine 0.03 (TRACE) (A) NEGATIVE mg/dL    Ketones, Urine TRACE (A) NEGATIVE mg/dL    Bilirubin, Urine NEGATIVE NEGATIVE mg/dL    Urobilinogen, Urine Normal Normal mg/dL    Nitrite, Urine NEGATIVE NEGATIVE    Leukocyte Esterase, Urine NEGATIVE NEGATIVE   Extra Urine Gray Tube   Result Value  Ref Range    Extra Tube Hold for add-ons.    Urinalysis Microscopic   Result Value Ref Range    WBC, Urine 1-5 1-5, NONE /HPF    RBC, Urine 1-2 NONE, 1-2, 3-5 /HPF    Mucus, Urine 2+ Reference range not established. /LPF    Hyaline Casts, Urine 2+ (A) NONE /LPF   DRUG SCREEN,URINE   Result Value Ref Range    Amphetamine Screen, Urine Presumptive Negative Presumptive Negative    Barbiturate Screen, Urine Presumptive Negative Presumptive Negative    Benzodiazepines Screen, Urine Presumptive Negative Presumptive Negative    Cannabinoid Screen, Urine Presumptive Negative Presumptive Negative    Cocaine Metabolite Screen, Urine Presumptive Positive (A) Presumptive Negative    Fentanyl Screen, Urine Presumptive Negative Presumptive Negative    Opiate Screen, Urine Presumptive Negative Presumptive Negative    Oxycodone Screen, Urine Presumptive Negative Presumptive Negative    PCP Screen, Urine Presumptive Negative Presumptive Negative    Methadone Screen, Urine Presumptive Negative Presumptive Negative   Troponin, High Sensitivity, 1 Hour   Result Value Ref Range    Troponin I, High Sensitivity 67 (HH) 0 - 20 ng/L   Basic metabolic panel   Result Value Ref Range    Glucose 117 (H) 74 - 99 mg/dL    Sodium 139 136 - 145 mmol/L    Potassium 4.1 3.5 - 5.3 mmol/L    Chloride 104 98 - 107 mmol/L    Bicarbonate 27 21 - 32 mmol/L    Anion Gap 12 10 - 20 mmol/L    Urea Nitrogen 17 6 - 23 mg/dL    Creatinine 1.47 (H) 0.50 - 1.30 mg/dL    eGFR 52 (L) >60 mL/min/1.73m*2    Calcium 8.8 8.6 - 10.3 mg/dL        Image Results  CT abdomen pelvis w IV contrast  Narrative: Interpreted By:  Jarrod Strange,   STUDY:  CT ABDOMEN PELVIS W IV CONTRAST;  2/18/2025 10:15 am      INDICATION:  Signs/Symptoms:RLQ pain.      COMPARISON:  None.      ACCESSION NUMBER(S):  [id number]      ORDERING CLINICIAN:  CHARLSE MCFARLAND      TECHNIQUE:  Contiguous axial images were obtained through the abdomen and pelvis  following the intravenous administration of 75  cc of Omnipaque 350.  Coronal and sagittal reconstructions were performed.      FINDINGS:  LOWER CHEST:  Images through the lung bases  reveal an area of partial  collapse/consolidation in the right lower lobe. There are additional  areas of atelectasis/scarring in the lungs. Mild patchy ground-glass  opacities also noted.      ABDOMEN AND PELVIS:      LIVER:  Hepatic parenchyma demonstrates a decreased attenuation, compatible  with fatty infiltration.      BILE DUCTS:  Not abnormally dilated.      GALLBLADDER:  Gallbladder demonstrates wall thickening and some pericholecystic  fluid and surrounding inflammation. Cholecystitis is a consideration.      PANCREAS:  Appears unremarkable.      SPLEEN:  Appears unremarkable.      ADRENAL GLANDS:  Appear unremarkable.      KIDNEYS, URETERS, AND BLADDER:  The kidneys enhance with contrast material symmetrically. There is no  evidence of hydronephrosis.  The urinary bladder appears grossly  unremarkable.      BOWEL:  There is colonic diverticulosis. No CT evidence of diverticulitis.  Small bowel loops appear normal in thickness and caliber. There is no  evidence of a bowel obstruction.  Appendix is normal in caliber.  Although CT has limited sensitivity and specificity for gastric  pathology, the stomach appears grossly unremarkable.      RETROPERITONEUM, VESSELS:  There is no aneurysmal dilatation of the abdominal aorta. The IVC is  within normal limits.  There are atherosclerotic calcifications of  the aorta and its branches. No pathologically enlarged  retroperitoneal lymph nodes are noted.      PERITONEUM:  There is no evidence of pneumoperitoneum.  No ascites or loculated  fluid collection noted.  No pathologically enlarged mesenteric lymph  nodes are identified.      ABDOMINAL WALL, SOFT TISSUES:  Small fat containing left inguinal hernia.      SKELETON:  Degenerative changes. No acute process.      Impression: Distended gallbladder demonstrating wall thickening,  pericholecystic  fluid, and surrounding inflammation, suggesting cholecystitis. If  further evaluation is warranted, HIDA scan or ultrasound can be  offered. Partial collapse/consolidation of the right lower lobe.  Nonspecific ground-glass opacities as well as areas of  atelectasis/scarring in the remaining visible portions of the lung  bases. Colonic diverticulosis.  Hepatic steatosis.      MACRO:  None.      Signed by: Jarrod Strange 2/18/2025 10:27 AM  Dictation workstation:   [id number]  ECG 12 lead  Sinus tachycardia  Paired ventricular premature complexes  Left atrial enlargement  Left ventricular hypertrophy  Inferior infarct, age indeterminate  Baseline wander in lead(s) V5       Assessment/Plan     Assessment & Plan  Cholecystitis  RUQ pain due to cholecystitis  Continue IV zosyn  Surgery to see, has consulted IR for cholecystotomy tube  NPO x meds  2/19: IR unable to place due to due to anatomy. GS will watch overnight, if worsens may need to go to OR. Continue antibiotics. Keep NPO.  Diabetes mellitus (Multi)  By records only. Does not appear to be on oral medication.  Last recorded A1c was 6.4 in 2025  Not currently on sliding scale.  2/19: Will place on SSI, may have less glycemic control while ill.  Hyperlipidemia  Continue statin.  Hypertension  Continue home meds, adjust as needed.  CAD (coronary artery disease), native coronary artery  CAD w elevated troponin  Check UDS given hx cocaine use  Pt not taking meds, will consult cardiology  Continue beta blocker if UDS negative for cocaine  2/19: Awaiting cards consult.  Dehydration  Dehydration due to acute illness  IV hydration, monitor for overload, last ef 35-40%  Chronic systolic heart failure  Last EF found was 35-40% in 2023.  Appears to be on Entresto. Will continue for now.        Faustino Willis MD

## 2025-02-19 NOTE — PROGRESS NOTES
Physical Therapy    Physical Therapy Evaluation    Patient Name: Román Marinelli  MRN: [id number]  Today's Date: 2/19/2025   Time Calculation  Start Time: 1459  Stop Time: 1518  Time Calculation (min): 19 min  2013/2013-A    Assessment/Plan   PT Assessment  PT Assessment Results: Decreased strength, Decreased endurance, Impaired balance, Decreased mobility, Pain  Rehab Prognosis: Good  Barriers to Discharge Home: Caregiver assistance, Physical needs  Caregiver Assistance:  (Uncertain of friend availability to assist at home.)  Physical Needs: Stair navigation into home limited by function/safety, 24hr mobility assistance needed, Intermittent ADL assistance needed, High falls risk due to function or environment  Evaluation/Treatment Tolerance: Patient tolerated treatment well, Patient limited by fatigue  Medical Staff Made Aware: Yes  End of Session Communication: Bedside nurse  Assessment Comment: Patient presents with decreased endurance, PSO2 decline with mobility, 7/10 intermittent pain R LQ with mobility, decreased strength and balance. This patient required 1-2 person assistance for mobility. He would benefit from  continued PT with recommendation for HIGH INTENSITY rehab (pending progress - with increased oral intake, patient may require less assistance).  End of Session Patient Position: Bed, 3 rail up, Alarm on  IP OR SWING BED PT PLAN  Inpatient or Swing Bed: Inpatient     02/19/25 0163   PT Plan   Treatment/Interventions Bed mobility;Transfer training;Gait training;Stair training;Balance training;Strengthening;Endurance training;Therapeutic exercise;Therapeutic activity   PT Plan Ongoing PT   PT Frequency 5 times per week   PT Discharge Recommendations High intensity level of continued care   Equipment Recommended upon Discharge   (TBD - has WW and WBQC at home.)   PT Recommended Transfer Status Assist x1   PT - OK to Discharge Yes  (To next level of care when medically cleared.)       Subjective     Current  Problem:  1. Cholecystitis          Patient Active Problem List   Diagnosis    Acute arterial ischemic stroke, multifocal, mult vascular territories (Multi)    Cholecystitis    Cocaine dependence    Diabetes mellitus (Multi)    Hyperlipidemia    Hypertension    NSTEMI (non-ST elevated myocardial infarction) (Multi)    Obesity, Class II, BMI 35-39.9    CAD (coronary artery disease), native coronary artery    Dehydration    Chronic systolic heart failure       General Visit Information:  General  Reason for Referral: Cholecystitis. Impaired mobility.  Referred By: Clyde Sauceda, APRN-CNP  Past Medical History Relevant to Rehab: PMHx:  cocaine use, coronary artery disease, cardiomyopathy, diabetes type 2, hyperlipidemia, CVA 1 year ago with residual L sided weakness. (70 y/o male admitted with cholecystitis. Cholecystostomy tube placement attempt failed due to technical reasons with inability to reach the gallbladder.)  Family/Caregiver Present: No  Co-Treatment: OT  Co-Treatment Reason: To optimize safe functional mobility with consideration of current medical status.  Prior to Session Communication: Bedside nurse  Patient Position Received: Bed, 3 rail up, Alarm on  General Comment: Patient alert, agreeable to participate in PT.    Home Living:  Home Living  Type of Home: House  Lives With: Friends  Home Adaptive Equipment: Walker rolling or standard, Quad cane  Home Layout: One level  Home Access: Stairs to enter with rails  Entrance Stairs-Rails:  (1)  Entrance Stairs-Number of Steps: 3  Bathroom Shower/Tub: Tub/shower unit  Bathroom Equipment: Shower chair with back    Prior Level of Function:  Prior Function Per Pt/Caregiver Report  Level of [place]: Independent with ADLs and functional transfers, Needs assistance with homemaking  Receives Help From: Friends  ADL Assistance: Independent  Ambulatory Assistance: Independent (With WBQC)  Hand Dominance: Right  Prior Function Comments: (+) 2-3 falls over  past 6 months with tendency to lose balance toward R.    Precautions:  Precautions  Precautions Comment: Fall precautions. Monitor HR and PSO2 with SOB.    Vital Signs:  Vital Signs  Vitals Session: During PT  Heart Rate: 104  Heart Rate Source: Monitor  SpO2:  (85-90% with SOB with all activity.)  Patient Position: Sitting  Objective     Pain:  Pain Assessment  Pain Assessment: 0-10  0-10 (Numeric) Pain Score: 7  Pain Type: Acute pain  Pain Location: Abdomen  Pain Orientation: Right, Lower    Cognition:  Cognition  Overall Cognitive Status: Within Functional Limits    General Assessments:  General Observation  General Observation: VC for pursed lip breathing to improve PSO2 from 85% to 90%. Patient educated in reaching back to control descent stand > sit. Instructed in potential benefit of pursed lip exhale with difficult or painful movements (grabbing R LQ with movement, reports quick pains).   Activity Tolerance  Endurance: Decreased tolerance for upright activites, Tolerates less than 10 min exercise with changes in vital signs  Sensation  Light Touch: No apparent deficits  Strength  Strength Comments: L LE grossly 4/5. R LE WFL.  Perception  Inattention/Neglect: Appears intact  Coordination  Movements are Fluid and Coordinated: Yes     Static Sitting Balance  Static Sitting-Balance Support: Feet supported, Bilateral upper extremity supported  Static Sitting-Level of Assistance: Close supervision  Static Standing Balance  Static Standing-Balance Support: Bilateral upper extremity supported  Static Standing-Level of Assistance: Minimum assistance    Functional Assessments:     Bed Mobility  Bed Mobility:  (Supine > sit MIN A of 2. Sit > supine MIN A for LE elevation.)  Transfers  Transfer:  (Sit <> stand MIN A of 1 +1 for safety.)  Ambulation/Gait Training  Ambulation/Gait Training Performed:  (Ambulated 10 feet with WW and MIN A of 1, + 1 for safety with slower advancement of L LE.)  Stairs  Stairs: No  (Decreased endurance with O2 desaturation.)       Extremity/Trunk Assessments:        RLE   RLE : Within Functional Limits  LLE   LLE : Within Functional Limits    Outcome Measures:     [facility] Basic Mobility  Turning from your back to your side while in a flat bed without using bedrails: A little  Moving from lying on your back to sitting on the side of a flat bed without using bedrails: Total  Moving to and from bed to chair (including a wheelchair): A little  Standing up from a chair using your arms (e.g. wheelchair or bedside chair): A little  To walk in hospital room: A little  Climbing 3-5 steps with railing: Total  Basic Mobility - Total Score: 14                                                             Goals:  Encounter Problems       Encounter Problems (Active)       To improve strength, balance, endurance, overall mobility:        Patient will participate in B LE exercises x 15 repetitions to sit <> stand with SBA.        Start:  02/19/25    Expected End:  03/05/25            Patient will complete supine <> sit with SBA.        Start:  02/19/25    Expected End:  03/05/25            Patient will ambulate 50 feet x 2 with WW vs quad cane with SBA.        Start:  02/19/25    Expected End:  03/05/25            Patient will negotiate 3 steps with 1 rail and SBA.        Start:  02/19/25    Expected End:  03/05/25                 Education Documentation  Mobility Training, taught by Nelly Mars PT at 2/19/2025  4:09 PM.  Learner: Patient  Readiness: Acceptance  Method: Explanation  Response: Verbalizes Understanding    Education Comments  No comments found.

## 2025-02-19 NOTE — ASSESSMENT & PLAN NOTE
CAD w elevated troponin  Check UDS given hx cocaine use  Pt not taking meds, will consult cardiology  Continue beta blocker if UDS negative for cocaine  2/19: Awaiting cards consult.

## 2025-02-19 NOTE — PROGRESS NOTES
Social work consult placed for discharge planning. SW reviewed pt's chart and communicated with TCC. No SW needs foreseen at this time. SW signing off; available upon request.    ALBERT Baeza ([id number])   Care Transitions

## 2025-02-19 NOTE — PROGRESS NOTES
Occupational Therapy  Evaluation    Patient Name: Román Marinelli  MRN: [id number]  Today's Date: 2/19/2025  Time Calculation  Start Time: 1401  Stop Time: 1420  Time Calculation (min): 19 min    Current Problem:   1. Cholecystitis        OT order: OT eval and treat   Referred by:    Reason for referral: ADLs  Past medical history related to rehab: cocaine use, coronary artery disease, cardiomyopathy, diabetes type 2, hyperlipidemia presenting with abdominal pain fatigue malaise    Precautions:   Medical Precautions: Fall precautions    ASSESSMENT  OT Assessment: Pt demonstrates decreased ADL, functional transfer, functional mobility, endurance and safety during session. Pt would benefit from skilled OT to address impairments in function. Decreased ADL status, Decreased upper extremity range of motion, Decreased upper extremity strength, Decreased safe judgment during ADL, Decreased endurance, Decreased functional mobility. Pt may re-rquire re assessment s/p sugey tube placement pending surgery team, will continue to follow pt progress.   Barriers to discharge home: Physical needs       PLAN  Frequency: 3 times per week  Treatment Interventions: ADL retraining, Functional transfer training, UE strengthening/ROM, Endurance training, Compensatory technique education, Neuromuscular reeducation  Discharge Recommendations: Moderate intensity level of continued care  OT OK to discharge: Yes    GENERAL VISIT INFORMATION   Start of session communication: Bedside nurse  End of session communication: Bedside nurse  Co-Treatment: PT  Reason for co-treatment: maximize pt safety and functional mobility   Position Pt Received:  Bed, 3 rail up, Alarm on  End of session position: Bed, 3 rail up, Alarm on    SUBJECTIVE  Home Living:  Type of Home: House  Lives With: Friends (female friend)  Home Adaptive Equipment: Walker rolling or standard, Cane (rollator)  Home Layout: One level  Home Access: Stairs to enter with rails  Entrance  Stairs-Number of Steps: 3  Bathroom Shower/Tub: Walk-in shower  Bathroom Toilet: Standard  Bathroom Equipment: Shower chair with back  Bathroom Accessibility: no grab bars     Prior Level of Function:  Level of [place]: Independent with ADLs and functional transfers, Needs assistance with homemaking  Receives Help From: Friends  ADL Assistance: Independent  Homemaking Assistance: Needs assistance  Ambulatory Assistance: Independent (wtih FWW and cane)  Prior Function Comments: 2-3 falls over last month         Pain:  Assessment: 0-10  Score: 7  Type: Acute pain  Location: Abdomen    OBJECTIVE  Vital Signs:  SpO2:  (dropped to 85 after 4-5 steps to and from bed, returned to 90 at EOB end of session)    Cognition:  Overall Cognitive Status: Within Functional Limits             Current ADL function:   EATING:  Stand by     GROOMING: Stand by     BATHING: Minimal Increased time to complete , Supervision/safety, Verbal cueing, Steadying, Setup, Right arm   UB DRESSING: Minimal Thread RUE, Increased time to complete, Supervision/safety, Steadying   LB DRESSING: Moderate Setup, Steadying, Supervision/safety, Increased time to complete   TOILETING: Moderate Setup, Steadying, Supervison/safety, Increased time to complete, Bedside commode, Clothing management up, Clothing management down  ADL comments:       Activity Tolerance:  Endurance: Decreased tolerance for upright activites (Pt has been NPO for multiple days, is getting agitated over not being able to eat or drink, feeling weak.)    Bed Mobility/Transfers:   Bed Mobility  Bed Mobility: Yes  Bed Mobility 1  Bed Mobility 1: Supine to sitting  Level of Assistance 1: Minimum assistance, +2  Bed Mobility Comments 1: use of bed rails, momentum, hob slightly elevated  Bed Mobility 2  Bed Mobility  2: Sitting to supine  Level of Assistance 2: Minimum assistance (x1)  Transfers  Transfer: Yes  Transfer 1  Transfer From 1: Sit to, Stand to  Transfer to 1: Stand,  Sit  Technique 1: Sit to stand, Stand to sit  Transfer Device 1: Walker  Transfer Level of Assistance 1: Minimum assistance, Contact guard  Trials/Comments 1: sat on EOB without reaching or feeling bed behind legs, educated on safe transfer positioning    Ambulation/Gait Training:  Functional Mobility  Functional Mobility Performed: Yes  Functional Mobility 1  Surface 1: Level tile  Device 1: Rolling walker  Assistance 1: Minimum assistance, Contact guard  Comments 1: 4-5 steps to and from bed, min assist x1 +1 for safety (history of LLE buckle, leading to falls at home)    Sitting Balance:  Static Sitting Balance  Static Sitting-Balance Support: Feet supported  Static Sitting-Level of Assistance: Close supervision       Vision: Vision - Basic Assessment  Current Vision: No visual deficits   and      Sensation:  Sensation Comment: no N/T reported    Strength:  Strength Comments: decreased LUE strength due to residual stroke symptoms. See below        Hand Function:  Hand Function  Gross Grasp: Functional  Coordination: Functional    Extremities: RUE   RUE : Within Functional Limits and LUE   LUE: Exceptions to WFL (AROM shoulder grossly 50 degrees, PROM shoulder flexion grossly 100 degrees before increased  pain, MMT LUE grossly 3-/5)    Outcome Measures: [facility] Daily Activity   Putting on and taking off regular lower body clothing: A lot  Bathing (including washing, rinsing, drying): A lot  Putting on and taking off regular upper body clothing: A little  Toileting, which includes using toilet, bedpan or urinal: A lot  Taking care of personal grooming such as brushing teeth: A little   Eating Meals: A little   Daily Activity - Total Score: 15    EDUCATION:     Education Documentation  Precautions, taught by Crissy Ospina OT at 2/19/2025  3:47 PM.  Learner: Patient  Readiness: Acceptance  Method: Explanation  Response: Verbalizes Understanding    Body Mechanics, taught by Crissy Ospina OT at 2/19/2025  3:47  PM.  Learner: Patient  Readiness: Acceptance  Method: Explanation  Response: Verbalizes Understanding    ADL Training, taught by Crissy Ospina OT at 2/19/2025  3:47 PM.  Learner: Patient  Readiness: Acceptance  Method: Explanation  Response: Verbalizes Understanding    Education Comments  No comments found.        Goals:   Encounter Problems       Encounter Problems (Active)       ADLs       Patient with complete lower body dressing with stand by assist level of assistance donning and doffing all LE clothes  with PRN adaptive equipment while edge of bed  (Progressing)       Start:  02/19/25    Expected End:  03/05/25            Patient will complete toileting including hygiene clothing management/hygiene with stand by assist level of assistance and PRN AE. (Progressing)       Start:  02/19/25    Expected End:  03/05/25               MOBILITY       Patient will perform Functional mobility  Household distances with stand by assist level of assistance and least restrictive device in order to improve safety and functional mobility. (Progressing)       Start:  02/19/25    Expected End:  03/05/25               TRANSFERS       Patient will complete functional transfers with least restrictive device with stand by assist level of assistance. (Progressing)       Start:  02/19/25    Expected End:  03/05/25

## 2025-02-19 NOTE — PROGRESS NOTES
Occupational Therapy                 Therapy Communication Note    Patient Name: Román Marinelli  MRN: [id number]  Department: [facility] 2 W  Room: 2013/2013-A  Today's Date: 2/19/2025     Discipline: Occupational Therapy          Missed Visit Reason: Missed Visit Reason: Other (Comment) (per RN, pt unable to receive sugey tube placement this AM, waiting for surgery to assess pt, will hold OT Eval until surgical plan confirmed/ will re-attempt as able.)

## 2025-02-19 NOTE — ASSESSMENT & PLAN NOTE
By records only. Does not appear to be on oral medication.  Last recorded A1c was 6.4 in 2025  Not currently on sliding scale.  2/19: Will place on SSI, may have less glycemic control while ill.

## 2025-02-19 NOTE — PROGRESS NOTES
02/19/25 1023   Discharge Planning   Living Arrangements Friends   Support Systems Friends/neighbors   Assistance Needed Independent with walker or cane   Type of Residence Private residence   Home or Post Acute Services In home services   Type of Home Care Services Home nursing visits;Home OT;Home PT   Expected Discharge Disposition Home H   Does the patient need discharge transport arranged? No   Financial Resource Strain   How hard is it for you to pay for the very basics like food, housing, medical care, and heating? Not hard   Housing Stability   In the last 12 months, was there a time when you were not able to pay the mortgage or rent on time? N   At any time in the past 12 months, were you homeless or living in a shelter (including now)? N   Transportation Needs   In the past 12 months, has lack of transportation kept you from medical appointments or from getting medications? no   In the past 12 months, has lack of transportation kept you from meetings, work, or from getting things needed for daily living? No     PCP is NIRALI Faulkner. Patient is from home with his friend. Patient states that he is independent with a walker or cane on ambulation. Patient is able to drive and shower independently. His friend assists with cooking and cleaning. [facility], [facility]. PT/ OT pending. Patient is open to HHC or SNF placement if recommended. TCC to follow.

## 2025-02-19 NOTE — CONSULTS
Inpatient consult to Cardiology  Consult performed by: Jewel Wilson MD  Consult ordered by: Clyde Sauceda, APRN-CNP  Reason for consult: elev troponin        History Of Present Illness:    Román Marinelli is a 69 y.o. male presenting with abd pain and malaise.  H/o coronary artery disease, cardiomyopathy, diabetes type 2, hyperlipidemia.  He tells me that he has had prior MI with 6 coronary stents.      Patient presents with RUQ abd pain x 3 days - progressively worsening, with associated vomiting and poor PO intake.  No chest pain, fevers/chills, diarrhea, dysuria.  In ED CT a/p demonstrated distended GB demonstrating pericholecystic fluid and wall thickening - concerning for probable acute cholecystitis.  General Surgery was consulted and the patient started on IV Zosyn given IV fluids and hydration. Plan was for IV Abx and cholecystotomy tube.  We are consulted for elevated troponin 69 > 67.  Other signif labs include leukocytosis WBC 20.1k, Hb 12.6, Cr 1.47.    TTE 11/2023:  CONCLUSIONS:   1. Left ventricular systolic function is moderately decreased with a 35-40% estimated ejection fraction.   2. Spectral Doppler shows an impaired relaxation pattern of left ventricular diastolic filling.   3. Mild aortic valve stenosis.   4. There is no evidence of a patent foramen ovale.    Fhx:  denies premature CAD  SocHx:  lifetime nonsmoker, former EtOH use, does use cocaine last time one week ago    Last Labs:  CBC - 2/19/2025: 10:00 AM  20.1 12.6 223    38.2      CMP - 2/19/2025:  3:32 AM  8.8 7.4 26 --- 1.8   _ 4.1 35 83      PTT - No results in last year.  _   _ _     Troponin I, High Sensitivity   Date/Time Value Ref Range Status   02/18/2025 09:23 AM 67 (HH) 0 - 20 ng/L Final     Comment:     Previous result verified on 2/18/2025 0903 on specimen/case [id number] called with component [facility] for procedure Troponin I, High Sensitivity, Initial with value 69 ng/L.   02/18/2025 08:25 AM 69 (HH) 0 - 20 ng/L  Final   11/17/2023 02:07 PM 19 0 - 20 ng/L Final     BNP   Date/Time Value Ref Range Status   11/17/2023 06:31  (H) 0 - 99 pg/mL Final     Hemoglobin A1C   Date/Time Value Ref Range Status   11/17/2023 06:31 PM 5.9 (H) see below % Final     LDL Calculated   Date/Time Value Ref Range Status   11/17/2023 06:31  (H) <=99 mg/dL Final     Comment:                                 Near   Borderline      AGE      Desirable  Optimal    High     High     Very High     0-19 Y     0 - 109     ---    110-129   >/= 130     ----    20-24 Y     0 - 119     ---    120-159   >/= 160     ----      >24 Y     0 -  99   100-129  130-159   160-189     >/=190       VLDL   Date/Time Value Ref Range Status   11/17/2023 06:31 PM 28 0 - 40 mg/dL Final      Last I/O:  I/O last 3 completed shifts:  In: 1097.9 (10.1 mL/kg) [I.V.:1047.9 (9.6 mL/kg); IV Piggyback:50]  Out: 500 (4.6 mL/kg) [Urine:500 (0.1 mL/kg/hr)]  Weight: 109.1 kg     Past Cardiology Tests (Last 3 Years):  EKG:  ECG 12 lead 02/18/2025 (Preliminary)      ECG 12 lead 11/20/2023    Echo:  Transthoracic Echo (TTE) Complete 11/18/2023    Ejection Fractions:  EF   Date/Time Value Ref Range Status   11/18/2023 01:40 PM 41       Cath:  No results found for this or any previous visit from the past 1095 days.    Stress Test:  No results found for this or any previous visit from the past 1095 days.    Cardiac Imaging:  No results found for this or any previous visit from the past 1095 days.      Past Medical History:  He has a past medical history of Heart attack (2020), Hypertension, Smoking, and Stroke (Multi) (2024).    Past Surgical History:  He has no past surgical history on file.      Social History:  He reports that he has quit smoking. His smoking use included cigarettes. He has never used smokeless tobacco. He reports that he does not use drugs. No history on file for alcohol use.    Family History:  No family history on file.     Allergies:  Lactose and  Amlodipine    Inpatient Medications:  Scheduled medications   Medication Dose Route Frequency    aspirin  81 mg oral Daily    cholecalciferol  4,000 Units oral Daily    heparin (porcine)  5,000 Units subcutaneous q8h    insulin lispro  0-5 Units subcutaneous TID AC    [Held by provider] metoprolol succinate XL  25 mg oral Daily    pantoprazole  40 mg oral Daily before breakfast    Or    pantoprazole  40 mg intravenous Daily before breakfast    piperacillin-tazobactam  3.375 g intravenous q6h    polyethylene glycol  17 g oral Daily    [Held by provider] sacubitriL-valsartan  1 tablet oral BID     PRN medications   Medication    bisacodyl    bisacodyl    dextrose    dextrose    glucagon    glucagon    guaiFENesin    melatonin    morphine    ondansetron ODT    Or    ondansetron     Continuous Medications   Medication Dose Last Rate    lactated Ringer's  125 mL/hr 125 mL/hr (02/19/25 1325)     Outpatient Medications:  Current Outpatient Medications   Medication Instructions    acetaminophen (TYLENOL) 650 mg, oral, Every 6 hours PRN    aspirin 81 mg, oral, Daily    atorvastatin (LIPITOR) 80 mg, oral, Nightly    cholecalciferol (VITAMIN D-3) 100 mcg, oral, Daily    isosorbide mononitrate ER (IMDUR) 30 mg, oral, Daily, Do not crush or chew.    magnesium hydroxide (Milk of Magnesia) 2,400 mg/10 mL suspension suspension 10 mL, oral, Daily PRN    melatonin 3 mg, oral, Daily    metoprolol succinate XL (TOPROL-XL) 25 mg, oral, Daily, Do not crush or chew.    sacubitriL-valsartan (Entresto) 24-26 mg tablet 1 tablet, oral, 2 times daily       Physical Exam:  Physical Exam  Constitutional:       Comments: Somnolent but arousable   HENT:      Head: Normocephalic.      Mouth/Throat:      Mouth: Mucous membranes are dry.   Eyes:      Extraocular Movements: Extraocular movements intact.   Cardiovascular:      Rate and Rhythm: Normal rate and regular rhythm.   Pulmonary:      Effort: Pulmonary effort is normal.   Abdominal:       General: There is distension.      Comments: Tender to palpation   Musculoskeletal:      Cervical back: Neck supple.   Skin:     General: Skin is warm.   Neurological:      Comments: Somnolent, arousable   Psychiatric:         Mood and Affect: Mood normal.            Assessment/Plan   Elevated troponin  Acute cholecystitis  CAD  Cardiomyopathy  DL  Cocaine use    Minimally elevated troponin without chest pain syndrome, in setting of acute cholecystitis, leukocytosis, and QASIM.  Suspect demand-related troponin leak in setting of above.  I personally reviewed the EKG on presentation, which demonstrated sinus tachycardia, possible LVH, occ PVC's, inferior infarct age undetermined, and PRWP cannot rule out anterior infarct age undetermined.    Recs:  -continue ASA 81mg daily  -at this point, do not recommend ischemic evaluation for the minimally elevated troponin  -if patient develops chest pain, please repeat EKG and HSTI  -history of cardiomyopathy with prior LVEF 35-40% - appears euvolemic on exam, no current indication for diuresis or TTE  -he should follow up with his [facility] cardiologist post discharge  -I recommend complete cessation from cocaine use    Peripheral IV 02/18/25 20 G Right Antecubital (Active)   Site Assessment Clean;Dry;Intact 02/19/25 0900   Dressing Status Clean;Dry 02/19/25 0900   Number of days: 1       Code Status:  Full Code      Jewel Wilson MD

## 2025-02-20 LAB
ANION GAP SERPL CALC-SCNC: 12 MMOL/L (ref 10–20)
BUN SERPL-MCNC: 20 MG/DL (ref 6–23)
CALCIUM SERPL-MCNC: 8 MG/DL (ref 8.6–10.3)
CHLORIDE SERPL-SCNC: 104 MMOL/L (ref 98–107)
CO2 SERPL-SCNC: 26 MMOL/L (ref 21–32)
CREAT SERPL-MCNC: 1.36 MG/DL (ref 0.5–1.3)
EGFRCR SERPLBLD CKD-EPI 2021: 57 ML/MIN/1.73M*2
ERYTHROCYTE [DISTWIDTH] IN BLOOD BY AUTOMATED COUNT: 15.3 % (ref 11.5–14.5)
GLUCOSE BLD MANUAL STRIP-MCNC: 103 MG/DL (ref 74–99)
GLUCOSE BLD MANUAL STRIP-MCNC: 108 MG/DL (ref 74–99)
GLUCOSE BLD MANUAL STRIP-MCNC: 113 MG/DL (ref 74–99)
GLUCOSE BLD MANUAL STRIP-MCNC: 120 MG/DL (ref 74–99)
GLUCOSE BLD MANUAL STRIP-MCNC: 145 MG/DL (ref 74–99)
GLUCOSE SERPL-MCNC: 118 MG/DL (ref 74–99)
HCT VFR BLD AUTO: 36.9 % (ref 41–52)
HGB BLD-MCNC: 11.7 G/DL (ref 13.5–17.5)
MCH RBC QN AUTO: 28 PG (ref 26–34)
MCHC RBC AUTO-ENTMCNC: 31.7 G/DL (ref 32–36)
MCV RBC AUTO: 88 FL (ref 80–100)
NRBC BLD-RTO: 0 /100 WBCS (ref 0–0)
PLATELET # BLD AUTO: 223 X10*3/UL (ref 150–450)
POTASSIUM SERPL-SCNC: 3.8 MMOL/L (ref 3.5–5.3)
RBC # BLD AUTO: 4.18 X10*6/UL (ref 4.5–5.9)
SODIUM SERPL-SCNC: 138 MMOL/L (ref 136–145)
WBC # BLD AUTO: 16.4 X10*3/UL (ref 4.4–11.3)

## 2025-02-20 PROCEDURE — 2500000004 HC RX 250 GENERAL PHARMACY W/ HCPCS (ALT 636 FOR OP/ED): Performed by: NURSE PRACTITIONER

## 2025-02-20 PROCEDURE — 1200000002 HC GENERAL ROOM WITH TELEMETRY DAILY

## 2025-02-20 PROCEDURE — 99233 SBSQ HOSP IP/OBS HIGH 50: CPT | Performed by: INTERNAL MEDICINE

## 2025-02-20 PROCEDURE — 2500000001 HC RX 250 WO HCPCS SELF ADMINISTERED DRUGS (ALT 637 FOR MEDICARE OP): Performed by: NURSE PRACTITIONER

## 2025-02-20 PROCEDURE — 36415 COLL VENOUS BLD VENIPUNCTURE: CPT | Performed by: INTERNAL MEDICINE

## 2025-02-20 PROCEDURE — 2500000001 HC RX 250 WO HCPCS SELF ADMINISTERED DRUGS (ALT 637 FOR MEDICARE OP): Performed by: INTERNAL MEDICINE

## 2025-02-20 PROCEDURE — 80048 BASIC METABOLIC PNL TOTAL CA: CPT | Performed by: INTERNAL MEDICINE

## 2025-02-20 PROCEDURE — 82947 ASSAY GLUCOSE BLOOD QUANT: CPT

## 2025-02-20 PROCEDURE — 85027 COMPLETE CBC AUTOMATED: CPT | Performed by: INTERNAL MEDICINE

## 2025-02-20 RX ADMIN — MORPHINE SULFATE 4 MG: 4 INJECTION, SOLUTION INTRAMUSCULAR; INTRAVENOUS at 13:28

## 2025-02-20 RX ADMIN — Medication 4000 UNITS: at 09:12

## 2025-02-20 RX ADMIN — PIPERACILLIN SODIUM AND TAZOBACTAM SODIUM 3.38 G: 3; .375 INJECTION, SOLUTION INTRAVENOUS at 23:14

## 2025-02-20 RX ADMIN — HEPARIN SODIUM 5000 UNITS: 5000 INJECTION, SOLUTION INTRAVENOUS; SUBCUTANEOUS at 23:15

## 2025-02-20 RX ADMIN — MORPHINE SULFATE 4 MG: 4 INJECTION, SOLUTION INTRAMUSCULAR; INTRAVENOUS at 09:16

## 2025-02-20 RX ADMIN — SACUBITRIL AND VALSARTAN 1 TABLET: 24; 26 TABLET, FILM COATED ORAL at 20:11

## 2025-02-20 RX ADMIN — PANTOPRAZOLE SODIUM 40 MG: 40 INJECTION, POWDER, FOR SOLUTION INTRAVENOUS at 04:15

## 2025-02-20 RX ADMIN — PIPERACILLIN SODIUM AND TAZOBACTAM SODIUM 3.38 G: 3; .375 INJECTION, SOLUTION INTRAVENOUS at 18:29

## 2025-02-20 RX ADMIN — PIPERACILLIN SODIUM AND TAZOBACTAM SODIUM 3.38 G: 3; .375 INJECTION, SOLUTION INTRAVENOUS at 04:15

## 2025-02-20 RX ADMIN — HEPARIN SODIUM 5000 UNITS: 5000 INJECTION, SOLUTION INTRAVENOUS; SUBCUTANEOUS at 15:53

## 2025-02-20 RX ADMIN — PIPERACILLIN SODIUM AND TAZOBACTAM SODIUM 3.38 G: 3; .375 INJECTION, SOLUTION INTRAVENOUS at 09:15

## 2025-02-20 ASSESSMENT — COGNITIVE AND FUNCTIONAL STATUS - GENERAL
HELP NEEDED FOR BATHING: A LITTLE
DRESSING REGULAR LOWER BODY CLOTHING: A LITTLE
WALKING IN HOSPITAL ROOM: A LITTLE
MOBILITY SCORE: 19
CLIMB 3 TO 5 STEPS WITH RAILING: A LITTLE
WALKING IN HOSPITAL ROOM: A LITTLE
TOILETING: A LITTLE
PERSONAL GROOMING: A LITTLE
TURNING FROM BACK TO SIDE WHILE IN FLAT BAD: A LITTLE
CLIMB 3 TO 5 STEPS WITH RAILING: A LITTLE
DRESSING REGULAR LOWER BODY CLOTHING: A LITTLE
TOILETING: A LITTLE
MOVING TO AND FROM BED TO CHAIR: A LITTLE
MOVING TO AND FROM BED TO CHAIR: A LITTLE
DRESSING REGULAR UPPER BODY CLOTHING: A LITTLE
DAILY ACTIVITIY SCORE: 19
DRESSING REGULAR UPPER BODY CLOTHING: A LITTLE
TURNING FROM BACK TO SIDE WHILE IN FLAT BAD: A LITTLE
DAILY ACTIVITIY SCORE: 19
HELP NEEDED FOR BATHING: A LITTLE
STANDING UP FROM CHAIR USING ARMS: A LITTLE
PERSONAL GROOMING: A LITTLE
MOBILITY SCORE: 19
STANDING UP FROM CHAIR USING ARMS: A LITTLE

## 2025-02-20 ASSESSMENT — ENCOUNTER SYMPTOMS
DYSURIA: 0
CHILLS: 0
NECK PAIN: 0
FATIGUE: 1
FEVER: 0
LIGHT-HEADEDNESS: 0
CONFUSION: 0
DIAPHORESIS: 0
CONSTIPATION: 0
DIARRHEA: 0
CHEST TIGHTNESS: 0
CHEST TIGHTNESS: 1
HALLUCINATIONS: 0
WEAKNESS: 0
DIFFICULTY URINATING: 0
HEMATURIA: 0
BACK PAIN: 0
PALPITATIONS: 0
VOMITING: 1
ABDOMINAL DISTENTION: 1
NAUSEA: 1
CHOKING: 0
CHILLS: 1
SHORTNESS OF BREATH: 0
WHEEZING: 0
ABDOMINAL PAIN: 0
NERVOUS/ANXIOUS: 0
COUGH: 0
JOINT SWELLING: 0
FACIAL SWELLING: 0
ABDOMINAL PAIN: 1
NAUSEA: 0
SORE THROAT: 0
AGITATION: 0
WOUND: 0

## 2025-02-20 ASSESSMENT — PAIN - FUNCTIONAL ASSESSMENT
PAIN_FUNCTIONAL_ASSESSMENT: 0-10

## 2025-02-20 ASSESSMENT — PAIN SCALES - GENERAL
PAINLEVEL_OUTOF10: 10 - WORST POSSIBLE PAIN
PAINLEVEL_OUTOF10: 0 - NO PAIN

## 2025-02-20 NOTE — PROGRESS NOTES
02/20/25 1045   Discharge Planning   Expected Discharge Disposition SNF   Does the patient need discharge transport arranged? Yes   RoundTrip coordination needed? Yes   Has discharge transport been arranged? No   Patient Choice   Provider Choice list and CMS website (https://medicare.gov/care-compare#search) for post-acute Quality and Resource Measure Data were provided and reviewed with: Patient   Patient / Family choosing to utilize agency / facility established prior to hospitalization No     Patient has been recommended for Skilled Nursing Facility. Provided skilled nursing list to patient from [facility] directory that includes facilities that are within  Post - Acute Quality Network, as well as meeting patient's medical needs, and are in-network for patient's insurance; while also in discharge geographic area patient prefers, and identifies each facilities CMS star rating. Patient is agreeable to SNF placement. He is uncertain of his [facility] service connection status. Will need to verify. TCC to follow.     1121 Spoke to [facility]  whom confirms patient is not service connected with the VA.     1132 Notified patient that he is not service connected through the [facility]. He is agreeable [facility] via [facility] if able.     1435 Spoke to Franny [phone] with the [facility] whom states that the [facility] referral can be started once patient is medically ready to see if the [facility] has SNF availability.

## 2025-02-20 NOTE — ASSESSMENT & PLAN NOTE
Dehydration due to acute illness  IV hydration, monitor for overload, last ef 35-40%  2/20: Appears euvolemic at present.

## 2025-02-20 NOTE — ASSESSMENT & PLAN NOTE
RUQ pain due to cholecystitis  Continue IV zosyn  Surgery to see, has consulted IR for cholecystotomy tube  NPO x meds  2/19: IR unable to place due to due to anatomy. GS will watch overnight, if worsens may need to go to OR. Continue antibiotics. Keep NPO.  2/20: Some improvement in WBC and Cr. Doubt will need emergent surgery at this point. Will transfer to med-surg. Continue iv antibiotics for now. Reassess in AM.

## 2025-02-20 NOTE — PROGRESS NOTES
Physical Therapy                 Therapy Communication Note    Patient Name: Román Marinelli  MRN: [id number]  Department: [facility] 2 W  Room: 2013/2013-A  Today's Date: 2/20/2025     Discipline: Physical Therapy    PT Missed Visit: Yes     Missed Visit Reason: Missed Visit Reason:  (pt declined due to being in pain. per RN pt is going down for surgery some time.)    Missed Time: Attempt    Comment:

## 2025-02-20 NOTE — ASSESSMENT & PLAN NOTE
Last EF found was 35-40% in 2023.  Appears to be on Entresto. Will continue for now.  2/20: Appears euvolemic at present. Recheck in AM.

## 2025-02-20 NOTE — PROGRESS NOTES
Occupational Therapy                 Therapy Communication Note    Patient Name: Román Marinelli  MRN: [id number]  Department: [facility] 2 W  Room: 2013/2013-A  Today's Date: 2/20/2025     Discipline: Occupational Therapy          Missed Visit Reason: Missed Visit Reason: Patient refused (declined d/t pain)    Missed Time: Attempt    Comment:

## 2025-02-20 NOTE — PROGRESS NOTES
"GENERAL SURGERY PROGRESS NOTE    Román Marinelli   1955   [id number]     Román Marinelli is a 69 y.o. male on day 2 of admission presenting with Cholecystitis.    Subjective  No acute events overnight.  Patient reports feeling improved and abdominal pain significantly improved.  Tolerated liquids yesterday.  No nausea or vomiting.  Is passing flatus.    Review of Systems:  Review of Systems   Constitutional:  Negative for chills and fever.   Respiratory:  Negative for chest tightness and shortness of breath.    Cardiovascular:  Negative for chest pain.   Gastrointestinal:  Negative for abdominal pain and nausea.   Genitourinary:  Negative for difficulty urinating.   Skin:  Negative for rash.       Objective    Last Recorded Vitals  Blood pressure 150/88, pulse 100, temperature 36.8 °C (98.3 °F), temperature source Temporal, resp. rate 18, height 1.753 m (5' 9\"), weight 109 kg (239 lb 10.2 oz), SpO2 94%.    Intake/Output last 3 Shifts:  I/O last 3 completed shifts:  In: 3377.1 (31.1 mL/kg) [I.V.:3077.1 (28.3 mL/kg); IV Piggyback:300]  Out: 1100 (10.1 mL/kg) [Urine:1100 (0.3 mL/kg/hr)]  Weight: 108.7 kg     Intake/Output Summary (Last 24 hours) at 2/20/2025 0707  Last data filed at 2/20/2025 0329  Gross per 24 hour   Intake 2329.17 ml   Output 600 ml   Net 1729.17 ml       Physical Exam  Constitutional:       General: He is not in acute distress.  HENT:      Head: Normocephalic.   Eyes:      General: No scleral icterus.  Cardiovascular:      Rate and Rhythm: Normal rate and regular rhythm.      Pulses: Normal pulses.   Pulmonary:      Effort: Pulmonary effort is normal. No respiratory distress.   Abdominal:      General: There is no distension.      Palpations: Abdomen is soft.      Comments: Very mild tenderness in the right upper quadrant, significantly improved from yesterday.  No guarding.  No peritoneal signs.   Musculoskeletal:         General: No swelling.      Cervical back: Neck supple.   Skin:     " General: Skin is warm and dry.   Neurological:      Mental Status: He is alert. Mental status is at baseline.         Relevant Results  Labs:  Results for orders placed or performed during the hospital encounter of 02/18/25 (from the past 24 hours)   CBC   Result Value Ref Range    WBC 20.1 (H) 4.4 - 11.3 x10*3/uL    nRBC 0.0 0.0 - 0.0 /100 WBCs    RBC 4.46 (L) 4.50 - 5.90 x10*6/uL    Hemoglobin 12.6 (L) 13.5 - 17.5 g/dL    Hematocrit 38.2 (L) 41.0 - 52.0 %    MCV 86 80 - 100 fL    MCH 28.3 26.0 - 34.0 pg    MCHC 33.0 32.0 - 36.0 g/dL    RDW 15.4 (H) 11.5 - 14.5 %    Platelets 223 150 - 450 x10*3/uL   POCT GLUCOSE   Result Value Ref Range    POCT Glucose 103 (H) 74 - 99 mg/dL   POCT GLUCOSE   Result Value Ref Range    POCT Glucose 104 (H) 74 - 99 mg/dL   Drug Screen, Urine   Result Value Ref Range    Amphetamine Screen, Urine Presumptive Negative Presumptive Negative    Barbiturate Screen, Urine Presumptive Negative Presumptive Negative    Benzodiazepines Screen, Urine Presumptive Negative Presumptive Negative    Cannabinoid Screen, Urine Presumptive Negative Presumptive Negative    Cocaine Metabolite Screen, Urine Presumptive Positive (A) Presumptive Negative    Fentanyl Screen, Urine Presumptive Negative Presumptive Negative    Opiate Screen, Urine Presumptive Positive (A) Presumptive Negative    Oxycodone Screen, Urine Presumptive Negative Presumptive Negative    PCP Screen, Urine Presumptive Negative Presumptive Negative    Methadone Screen, Urine Presumptive Negative Presumptive Negative   CBC   Result Value Ref Range    WBC 16.4 (H) 4.4 - 11.3 x10*3/uL    nRBC 0.0 0.0 - 0.0 /100 WBCs    RBC 4.18 (L) 4.50 - 5.90 x10*6/uL    Hemoglobin 11.7 (L) 13.5 - 17.5 g/dL    Hematocrit 36.9 (L) 41.0 - 52.0 %    MCV 88 80 - 100 fL    MCH 28.0 26.0 - 34.0 pg    MCHC 31.7 (L) 32.0 - 36.0 g/dL    RDW 15.3 (H) 11.5 - 14.5 %    Platelets 223 150 - 450 x10*3/uL   Basic Metabolic Panel   Result Value Ref Range    Glucose 118 (H)  74 - 99 mg/dL    Sodium 138 136 - 145 mmol/L    Potassium 3.8 3.5 - 5.3 mmol/L    Chloride 104 98 - 107 mmol/L    Bicarbonate 26 21 - 32 mmol/L    Anion Gap 12 10 - 20 mmol/L    Urea Nitrogen 20 6 - 23 mg/dL    Creatinine 1.36 (H) 0.50 - 1.30 mg/dL    eGFR 57 (L) >60 mL/min/1.73m*2    Calcium 8.0 (L) 8.6 - 10.3 mg/dL       Images:  IR biliary cholecystostomy   Final Result   Ultrasound evaluation for percutaneous cholecystostomy tube placement   demonstrates unfavorable anatomy and a safe approach could not be   identified. No intervention performed.             Performed and dictated at [facility].        MACRO:   None        Signed by: Rubio Ch 2/19/2025 9:32 AM   Dictation workstation:   [id number]      CT abdomen pelvis w IV contrast   Final Result   Distended gallbladder demonstrating wall thickening, pericholecystic   fluid, and surrounding inflammation, suggesting cholecystitis. If   further evaluation is warranted, HIDA scan or ultrasound can be   offered. Partial collapse/consolidation of the right lower lobe.   Nonspecific ground-glass opacities as well as areas of   atelectasis/scarring in the remaining visible portions of the lung   bases. Colonic diverticulosis.   Hepatic steatosis.        MACRO:   None.        Signed by: Jarrod Strange 2/18/2025 10:27 AM   Dictation workstation:   [id number]          Assessment and Plan  Assessment & Plan  Cholecystitis    Diabetes mellitus (Multi)    Hyperlipidemia    Hypertension    CAD (coronary artery disease), native coronary artery    Dehydration    Chronic systolic heart failure    69 y.o. male with history of stroke 1 year ago, hypertension, and intermittent cocaine use (last use last week), presenting with 3 days of right upper quadrant abdominal pain with CT imaging demonstrating signs concerning for acute cholecystitis.  Given patient's symptoms being present for send period of time, increase in inflammation will  increase risk of surgery.  IR was unable to place a PERC cholecystostomy tube due to anatomical barriers.  Patient appears to be improving with conservative management at this time.  Will continue IV antibiotics and continue to monitor.  If patient declines, may need surgery at that time.    Plan:  - No acute surgical intervention at this time.  If patient declines, may need surgery.  IMS to perform perioperative restratification, discussed with Dr. Willis.  - Continue IV antibiotics: Zosyn  - Pain control and antinausea medication  - Clear liquid diet    Discussed with attending Dr. Helio Payne DO - PGY4  General Surgery

## 2025-02-20 NOTE — ASSESSMENT & PLAN NOTE
CAD w elevated troponin  Check UDS given hx cocaine use  Pt not taking meds, will consult cardiology  Continue beta blocker if UDS negative for cocaine  2/19: Awaiting cards consult.  2/20: Revised cardiac risk index appears to be about 4 (For intraperitoneal surgery, hx CHF, MI, occasional insulin), risk of major cardiac event is 15%. Does not appear to be in active CHF or coronary ischemia. Appreciate cards input.

## 2025-02-20 NOTE — CARE PLAN
The patient's goals for the shift include    Problem: Pain  Goal: Takes deep breaths with improved pain control throughout the shift  Outcome: Progressing  Goal: Turns in bed with improved pain control throughout the shift  Outcome: Progressing  Goal: Walks with improved pain control throughout the shift  Outcome: Progressing  Goal: Performs ADL's with improved pain control throughout shift  Outcome: Progressing  Goal: Participates in PT with improved pain control throughout the shift  Outcome: Progressing  Goal: Free from opioid side effects throughout the shift  Outcome: Progressing  Goal: Free from acute confusion related to pain meds throughout the shift  Outcome: Progressing     Problem: Safety - Adult  Goal: Free from fall injury  Outcome: Progressing     Problem: Discharge Planning  Goal: Discharge to home or other facility with appropriate resources  Outcome: Progressing     Problem: Chronic Conditions and Co-morbidities  Goal: Patient's chronic conditions and co-morbidity symptoms are monitored and maintained or improved  Outcome: Progressing     Problem: Nutrition  Goal: Nutrient intake appropriate for maintaining nutritional needs  Outcome: Progressing     Problem: Skin  Goal: Decreased wound size/increased tissue granulation at next dressing change  Outcome: Progressing  Goal: Participates in plan/prevention/treatment measures  Outcome: Progressing  Flowsheets (Taken 2/20/2025 1841)  Participates in plan/prevention/treatment measures: Increase activity/out of bed for meals  Goal: Prevent/manage excess moisture  Outcome: Progressing  Flowsheets (Taken 2/20/2025 1841)  Prevent/manage excess moisture:   Cleanse incontinence/protect with barrier cream   Monitor for/manage infection if present  Goal: Prevent/minimize sheer/friction injuries  Outcome: Progressing  Flowsheets (Taken 2/20/2025 1841)  Prevent/minimize sheer/friction injuries:   Use pull sheet   Turn/reposition every 2 hours/use  positioning/transfer devices  Goal: Promote/optimize nutrition  Outcome: Progressing  Flowsheets (Taken 2/20/2025 1841)  Promote/optimize nutrition:   Monitor/record intake including meals   Consume > 50% meals/supplements   Offer water/supplements/favorite foods  Goal: Promote skin healing  Outcome: Progressing     Problem: Diabetes  Goal: Achieve decreasing blood glucose levels by end of shift  Outcome: Progressing  Goal: Increase stability of blood glucose readings by end of shift  Outcome: Progressing  Goal: Decrease in ketones present in urine by end of shift  Outcome: Progressing  Goal: Maintain electrolyte levels within acceptable range throughout shift  Outcome: Progressing  Goal: Maintain glucose levels >70mg/dl to <250mg/dl throughout shift  Outcome: Progressing  Goal: No changes in neurological exam by end of shift  Outcome: Progressing  Goal: Learn about and adhere to nutrition recommendations by end of shift  Outcome: Progressing  Goal: Vital signs within normal range for age by end of shift  Outcome: Progressing  Goal: Increase self care and/or family involovement by end of shift  Outcome: Progressing  Goal: Receive DSME education by end of shift  Outcome: Progressing     Problem: Heart Failure  Goal: Improved gas exchange this shift  Outcome: Progressing  Goal: Improved urinary output this shift  Outcome: Progressing  Goal: Reduction in peripheral edema within 24 hours  Outcome: Progressing  Goal: Report improvement of dyspnea/breathlessness this shift  Outcome: Progressing  Goal: Weight from fluid excess reduced over 2-3 days, then stabilize  Outcome: Progressing  Goal: Increase self care and/or family involvement in 24 hours  Outcome: Progressing       The clinical goals for the shift include no pain throughout shift.    See assessment and mar. Weaned to room air. Morning labs ordered. See blood sugars. Tele as ordered. Transferred to [facility] this afternoon.

## 2025-02-20 NOTE — PROGRESS NOTES
Román Marinelli is a 69 y.o. male on day 2 of admission presenting with Cholecystitis.    Review of Systems   Constitutional:  Positive for chills and fatigue. Negative for diaphoresis and fever.   HENT:  Negative for congestion, facial swelling, sneezing and sore throat.    Respiratory:  Positive for chest tightness. Negative for cough, choking, shortness of breath and wheezing.    Cardiovascular:  Negative for chest pain, palpitations and leg swelling.   Gastrointestinal:  Positive for abdominal distention, abdominal pain, nausea and vomiting. Negative for constipation and diarrhea.   Genitourinary:  Negative for dysuria, hematuria and urgency.   Musculoskeletal:  Negative for back pain, gait problem, joint swelling and neck pain.   Skin:  Negative for rash and wound.   Neurological:  Negative for syncope, weakness and light-headedness.   Psychiatric/Behavioral:  Negative for agitation, confusion and hallucinations. The patient is not nervous/anxious.    All other systems reviewed and are negative.     Subjective   Román Marinelli is a 69 y.o. male with PMHx s/f cocaine use, coronary artery disease, cardiomyopathy, diabetes type 2, hyperlipidemia presenting with abdominal pain fatigue malaise.  Patient came to hospital [facility] right upper quadrant abdominal pain ongoing for about 3 days.  The pain is becoming progressively worse could not take it anymore.  Patient has also had some vomiting associated with the pain.  He is denying any fevers chills or rigors but has not been eating or drinking very well.  He denies any chest pain cough any sputum production.  Has not had any diarrhea or urinary symptoms.  Emergency department workup demonstrated probable acute cholecystitis General Surgery was consulted patient started on IV Zosyn given IV fluids and hydration.  Case was discussed with the ED provider patient will be admitted length of stay likely to exceed 2 midnights.  Patient will need continued IV antibiotics  interventional procedure with radiology to perform cholecystotomy tube and allow time for antibiotics to treat the underlying infection.     ED Course (Summary):   Vitals on presentation: Temperature 99 heart rate 105 respiratory rate 18 blood pressure 166/112 SpO2 95% room air  Labs: Glucose 169 creatinine 1.43 total bilirubin 1.8 initial troponin 60 9 repeat troponin 67 CBC significant for leukocytosis white blood cell count 21.8 platelets 293  Imaging: CT of abdomen and pelvis showing distended gallbladder demonstrating wall thickening pericholecystic fluid nonspecific groundglass opacities and atelectasis and remaining portion of the lung bases  Interventions: Patient given IV fluids started on IV Zosyn ED provider consulted with general surgery will plan for cholecystotomy tube and admission    2/19: Pt seen. Had received pain medication and is groggy. Does not withdrawal from abdominal exam, no peritoneal signs. IR unable to place perc sugey drain due to bowel loop in the way. GS aware. Will watch pt overnight on iv antibiotics. If worsens may need to accept risk and go to OR. Continue iv antibiotics. Will recheck in AM.    2/20: Pt seen. More awake today. Discussed with GS. Seems to be responding to antibiotics. Started clears. Will transfer to med-surg. Tolerating clears but still has some abdominal pain with po intake. Current plan is to treat overnight and consider eventual release home on oral antibiotics. Will plan sugey as outpatient after completing antibiotics. Reassess in AM.       Objective     Last Recorded Vitals  /76 (BP Location: Right arm, Patient Position: Lying)   Pulse 81   Temp 36.6 °C (97.9 °F) (Temporal)   Resp 18   Wt 109 kg (239 lb 10.2 oz)   SpO2 92%   Intake/Output last 3 Shifts:    Intake/Output Summary (Last 24 hours) at 2/20/2025 1516  Last data filed at 2/20/2025 0329  Gross per 24 hour   Intake 879.17 ml   Output 600 ml   Net 279.17 ml       Admission Weight  Weight:  112 kg (247 lb) (02/18/25 0811)    Daily Weight  02/20/25 : 109 kg (239 lb 10.2 oz)      Physical Exam  Constitutional:       Appearance: He is ill-appearing.   HENT:      Head: Normocephalic and atraumatic.      Nose: Nose normal. No congestion or rhinorrhea.      Mouth/Throat:      Mouth: Mucous membranes are dry.      Pharynx: Oropharynx is clear.   Eyes:      General: No scleral icterus.     Extraocular Movements: Extraocular movements intact.      Pupils: Pupils are equal, round, and reactive to light.   Cardiovascular:      Rate and Rhythm: Normal rate and regular rhythm.      Heart sounds: Normal heart sounds. No murmur heard.     No friction rub. No gallop.   Pulmonary:      Effort: Pulmonary effort is normal.      Breath sounds: Normal breath sounds. No wheezing, rhonchi or rales.   Chest:      Chest wall: No tenderness.   Abdominal:      General: There is no distension.      Palpations: Abdomen is soft.      Tenderness: There is abdominal tenderness. There is no guarding or rebound.   Musculoskeletal:         General: No swelling, tenderness or signs of injury. Normal range of motion.      Cervical back: Normal range of motion.   Skin:     General: Skin is warm and dry.      Coloration: Skin is not jaundiced.      Findings: No bruising, erythema or rash.   Neurological:      General: No focal deficit present.      Mental Status: He is alert and oriented to person, place, and time.          Lab Results  Results for orders placed or performed during the hospital encounter of 02/18/25 (from the past 24 hours)   POCT GLUCOSE   Result Value Ref Range    POCT Glucose 103 (H) 74 - 99 mg/dL   POCT GLUCOSE   Result Value Ref Range    POCT Glucose 104 (H) 74 - 99 mg/dL   Drug Screen, Urine   Result Value Ref Range    Amphetamine Screen, Urine Presumptive Negative Presumptive Negative    Barbiturate Screen, Urine Presumptive Negative Presumptive Negative    Benzodiazepines Screen, Urine Presumptive Negative  Presumptive Negative    Cannabinoid Screen, Urine Presumptive Negative Presumptive Negative    Cocaine Metabolite Screen, Urine Presumptive Positive (A) Presumptive Negative    Fentanyl Screen, Urine Presumptive Negative Presumptive Negative    Opiate Screen, Urine Presumptive Positive (A) Presumptive Negative    Oxycodone Screen, Urine Presumptive Negative Presumptive Negative    PCP Screen, Urine Presumptive Negative Presumptive Negative    Methadone Screen, Urine Presumptive Negative Presumptive Negative   CBC   Result Value Ref Range    WBC 16.4 (H) 4.4 - 11.3 x10*3/uL    nRBC 0.0 0.0 - 0.0 /100 WBCs    RBC 4.18 (L) 4.50 - 5.90 x10*6/uL    Hemoglobin 11.7 (L) 13.5 - 17.5 g/dL    Hematocrit 36.9 (L) 41.0 - 52.0 %    MCV 88 80 - 100 fL    MCH 28.0 26.0 - 34.0 pg    MCHC 31.7 (L) 32.0 - 36.0 g/dL    RDW 15.3 (H) 11.5 - 14.5 %    Platelets 223 150 - 450 x10*3/uL   Basic Metabolic Panel   Result Value Ref Range    Glucose 118 (H) 74 - 99 mg/dL    Sodium 138 136 - 145 mmol/L    Potassium 3.8 3.5 - 5.3 mmol/L    Chloride 104 98 - 107 mmol/L    Bicarbonate 26 21 - 32 mmol/L    Anion Gap 12 10 - 20 mmol/L    Urea Nitrogen 20 6 - 23 mg/dL    Creatinine 1.36 (H) 0.50 - 1.30 mg/dL    eGFR 57 (L) >60 mL/min/1.73m*2    Calcium 8.0 (L) 8.6 - 10.3 mg/dL   POCT GLUCOSE   Result Value Ref Range    POCT Glucose 120 (H) 74 - 99 mg/dL   POCT GLUCOSE   Result Value Ref Range    POCT Glucose 108 (H) 74 - 99 mg/dL        Image Results  ECG 12 lead  Sinus tachycardia  Paired ventricular premature complexes  Left atrial enlargement  Left ventricular hypertrophy  Inferior infarct, age indeterminate  Baseline wander in lead(s) V5    Confirmed by Jewel Wilson ([id number]) on 2/19/2025 3:54:02 PM  IR biliary cholecystostomy  Narrative: Interpreted By:  Rubio Ch,   STUDY:  IR BILIARY CHOLECYSTOSTOMY;  2/19/2025 9:10 am      INDICATION:  Signs/Symptoms:perc cholecystostomy tube placement.          COMPARISON:  None.      ACCESSION  NUMBER(S):  [id number]      ORDERING CLINICIAN:  RYAN ENGEL      TECHNIQUE:      CONSENT:  The patient/patient's POA/next of kin was informed of the nature of  the proposed procedure. The purposes, alternatives, risks, and  benefits were explained and discussed. All questions were answered  and consent was obtained.      RADIATION EXPOSURE:  None      SEDATION:  None      MEDICATION/CONTRAST:  No additional      TIME OUT:  A time out was performed immediately prior to procedure start with  the interventional team, correctly identifying the patient name, date  of birth, MRN, procedure, anatomy (including marking of site and  side), patient position, procedure consent form, relevant laboratory  and imaging test results, antibiotic administration, safety  precautions, and procedure-specific equipment needs.      COMPLICATIONS:  No immediate adverse events identified.      FINDINGS:  Patient was brought down for cholecystostomy tube placement as  clinically and radiographically cholecystitis is favored. Due to  duration of symptoms and clinical comorbidities Surgical Services  requested percutaneous cholecystostomy. Review of the patient's CT  demonstrates very difficult anatomy with on shelving of the  gallbladder which is directed superiorly and terminates at the liver  dome. In addition to this the colon is completely transposed over the  anterior margin of the gallbladder. The patient was placed in the  left posterior oblique position and ultrasound evaluation was  performed in order to identify the gallbladder. The gallbladder was  identified, however, the level of which was at the nipple well above  the pleural space boundary. The lung could be seen in the needed  trajectory for cholecystostomy tube placement. Allowing for these  factors, the procedure was terminated.      Impression: Ultrasound evaluation for percutaneous cholecystostomy tube placement  demonstrates unfavorable anatomy and a safe approach  could not be  identified. No intervention performed.          Performed and dictated at [facility].      MACRO:  None      Signed by: Rubio Ch 2/19/2025 9:32 AM  Dictation workstation:   [id number]       Assessment/Plan     Assessment & Plan  Cholecystitis  RUQ pain due to cholecystitis  Continue IV zosyn  Surgery to see, has consulted IR for cholecystotomy tube  NPO x meds  2/19: IR unable to place due to due to anatomy. GS will watch overnight, if worsens may need to go to OR. Continue antibiotics. Keep NPO.  2/20: Some improvement in WBC and Cr. Doubt will need emergent surgery at this point. Will transfer to med-surg. Continue iv antibiotics for now. Reassess in AM.  Diabetes mellitus (Multi)  By records only. Does not appear to be on oral medication.  Last recorded A1c was 6.4 in 2025  Not currently on sliding scale.  2/19: Will place on SSI, may have less glycemic control while ill.  Hyperlipidemia  Continue statin.  Hypertension  Continue home meds, adjust as needed.  CAD (coronary artery disease), native coronary artery  CAD w elevated troponin  Check UDS given hx cocaine use  Pt not taking meds, will consult cardiology  Continue beta blocker if UDS negative for cocaine  2/19: Awaiting cards consult.  2/20: Revised cardiac risk index appears to be about 4 (For intraperitoneal surgery, hx CHF, MI, occasional insulin), risk of major cardiac event is 15%. Does not appear to be in active CHF or coronary ischemia. Appreciate cards input.  Dehydration  Dehydration due to acute illness  IV hydration, monitor for overload, last ef 35-40%  2/20: Appears euvolemic at present.  Chronic systolic heart failure  Last EF found was 35-40% in 2023.  Appears to be on Entresto. Will continue for now.  2/20: Appears euvolemic at present. Recheck in AM.        Faustino Willis MD

## 2025-02-21 ENCOUNTER — APPOINTMENT (OUTPATIENT)
Dept: CARDIOLOGY | Facility: HOSPITAL | Age: 70
DRG: 418 | End: 2025-02-21
Payer: OTHER GOVERNMENT

## 2025-02-21 LAB
ANION GAP SERPL CALC-SCNC: 12 MMOL/L (ref 10–20)
BUN SERPL-MCNC: 16 MG/DL (ref 6–23)
CALCIUM SERPL-MCNC: 8 MG/DL (ref 8.6–10.3)
CHLORIDE SERPL-SCNC: 104 MMOL/L (ref 98–107)
CO2 SERPL-SCNC: 23 MMOL/L (ref 21–32)
CREAT SERPL-MCNC: 1.15 MG/DL (ref 0.5–1.3)
EGFRCR SERPLBLD CKD-EPI 2021: 70 ML/MIN/1.73M*2
ERYTHROCYTE [DISTWIDTH] IN BLOOD BY AUTOMATED COUNT: 15.2 % (ref 11.5–14.5)
GLUCOSE BLD MANUAL STRIP-MCNC: 114 MG/DL (ref 74–99)
GLUCOSE BLD MANUAL STRIP-MCNC: 143 MG/DL (ref 74–99)
GLUCOSE BLD MANUAL STRIP-MCNC: 156 MG/DL (ref 74–99)
GLUCOSE BLD MANUAL STRIP-MCNC: 171 MG/DL (ref 74–99)
GLUCOSE SERPL-MCNC: 129 MG/DL (ref 74–99)
HCT VFR BLD AUTO: 40.6 % (ref 41–52)
HGB BLD-MCNC: 12.9 G/DL (ref 13.5–17.5)
MCH RBC QN AUTO: 27.9 PG (ref 26–34)
MCHC RBC AUTO-ENTMCNC: 31.8 G/DL (ref 32–36)
MCV RBC AUTO: 88 FL (ref 80–100)
NRBC BLD-RTO: 0 /100 WBCS (ref 0–0)
PLATELET # BLD AUTO: 261 X10*3/UL (ref 150–450)
POTASSIUM SERPL-SCNC: 3.5 MMOL/L (ref 3.5–5.3)
RBC # BLD AUTO: 4.62 X10*6/UL (ref 4.5–5.9)
SODIUM SERPL-SCNC: 135 MMOL/L (ref 136–145)
WBC # BLD AUTO: 13.2 X10*3/UL (ref 4.4–11.3)

## 2025-02-21 PROCEDURE — 2500000001 HC RX 250 WO HCPCS SELF ADMINISTERED DRUGS (ALT 637 FOR MEDICARE OP): Performed by: NURSE PRACTITIONER

## 2025-02-21 PROCEDURE — 82947 ASSAY GLUCOSE BLOOD QUANT: CPT

## 2025-02-21 PROCEDURE — 1100000001 HC PRIVATE ROOM DAILY

## 2025-02-21 PROCEDURE — 97535 SELF CARE MNGMENT TRAINING: CPT | Mod: GO,CO

## 2025-02-21 PROCEDURE — 80048 BASIC METABOLIC PNL TOTAL CA: CPT | Performed by: INTERNAL MEDICINE

## 2025-02-21 PROCEDURE — 97530 THERAPEUTIC ACTIVITIES: CPT | Mod: GO,CO

## 2025-02-21 PROCEDURE — 93010 ELECTROCARDIOGRAM REPORT: CPT | Performed by: INTERNAL MEDICINE

## 2025-02-21 PROCEDURE — 2500000004 HC RX 250 GENERAL PHARMACY W/ HCPCS (ALT 636 FOR OP/ED): Performed by: NURSE PRACTITIONER

## 2025-02-21 PROCEDURE — 2500000004 HC RX 250 GENERAL PHARMACY W/ HCPCS (ALT 636 FOR OP/ED): Performed by: INTERNAL MEDICINE

## 2025-02-21 PROCEDURE — 2500000005 HC RX 250 GENERAL PHARMACY W/O HCPCS: Performed by: NURSE PRACTITIONER

## 2025-02-21 PROCEDURE — 97116 GAIT TRAINING THERAPY: CPT | Mod: CQ,GP

## 2025-02-21 PROCEDURE — 85027 COMPLETE CBC AUTOMATED: CPT | Performed by: INTERNAL MEDICINE

## 2025-02-21 PROCEDURE — 93005 ELECTROCARDIOGRAM TRACING: CPT

## 2025-02-21 PROCEDURE — 99233 SBSQ HOSP IP/OBS HIGH 50: CPT | Performed by: INTERNAL MEDICINE

## 2025-02-21 PROCEDURE — 2500000002 HC RX 250 W HCPCS SELF ADMINISTERED DRUGS (ALT 637 FOR MEDICARE OP, ALT 636 FOR OP/ED): Performed by: INTERNAL MEDICINE

## 2025-02-21 PROCEDURE — 2500000001 HC RX 250 WO HCPCS SELF ADMINISTERED DRUGS (ALT 637 FOR MEDICARE OP): Performed by: INTERNAL MEDICINE

## 2025-02-21 PROCEDURE — 36415 COLL VENOUS BLD VENIPUNCTURE: CPT | Performed by: INTERNAL MEDICINE

## 2025-02-21 RX ORDER — ISOSORBIDE MONONITRATE 30 MG/1
30 TABLET, EXTENDED RELEASE ORAL DAILY
Status: DISCONTINUED | OUTPATIENT
Start: 2025-02-21 | End: 2025-02-25 | Stop reason: HOSPADM

## 2025-02-21 RX ORDER — METOPROLOL TARTRATE 25 MG/1
25 TABLET, FILM COATED ORAL 2 TIMES DAILY
Status: DISCONTINUED | OUTPATIENT
Start: 2025-02-21 | End: 2025-02-25 | Stop reason: HOSPADM

## 2025-02-21 RX ADMIN — SACUBITRIL AND VALSARTAN 1 TABLET: 24; 26 TABLET, FILM COATED ORAL at 08:54

## 2025-02-21 RX ADMIN — PIPERACILLIN SODIUM AND TAZOBACTAM SODIUM 3.38 G: 3; .375 INJECTION, SOLUTION INTRAVENOUS at 20:40

## 2025-02-21 RX ADMIN — HEPARIN SODIUM 5000 UNITS: 5000 INJECTION, SOLUTION INTRAVENOUS; SUBCUTANEOUS at 08:55

## 2025-02-21 RX ADMIN — INSULIN LISPRO 1 UNITS: 100 INJECTION, SOLUTION INTRAVENOUS; SUBCUTANEOUS at 14:25

## 2025-02-21 RX ADMIN — POLYETHYLENE GLYCOL 3350 17 G: 17 POWDER, FOR SOLUTION ORAL at 08:54

## 2025-02-21 RX ADMIN — MORPHINE SULFATE 4 MG: 4 INJECTION, SOLUTION INTRAMUSCULAR; INTRAVENOUS at 08:55

## 2025-02-21 RX ADMIN — ASPIRIN 81 MG: 81 TABLET, COATED ORAL at 08:54

## 2025-02-21 RX ADMIN — PIPERACILLIN SODIUM AND TAZOBACTAM SODIUM 3.38 G: 3; .375 INJECTION, SOLUTION INTRAVENOUS at 14:25

## 2025-02-21 RX ADMIN — METOPROLOL TARTRATE 25 MG: 25 TABLET, FILM COATED ORAL at 20:40

## 2025-02-21 RX ADMIN — PIPERACILLIN SODIUM AND TAZOBACTAM SODIUM 3.38 G: 3; .375 INJECTION, SOLUTION INTRAVENOUS at 05:30

## 2025-02-21 RX ADMIN — SACUBITRIL AND VALSARTAN 1 TABLET: 24; 26 TABLET, FILM COATED ORAL at 20:40

## 2025-02-21 RX ADMIN — HEPARIN SODIUM 5000 UNITS: 5000 INJECTION, SOLUTION INTRAVENOUS; SUBCUTANEOUS at 17:35

## 2025-02-21 RX ADMIN — METOPROLOL SUCCINATE 25 MG: 25 TABLET, EXTENDED RELEASE ORAL at 08:54

## 2025-02-21 RX ADMIN — MORPHINE SULFATE 4 MG: 4 INJECTION, SOLUTION INTRAMUSCULAR; INTRAVENOUS at 03:51

## 2025-02-21 RX ADMIN — ISOSORBIDE MONONITRATE 30 MG: 30 TABLET, EXTENDED RELEASE ORAL at 18:27

## 2025-02-21 RX ADMIN — PANTOPRAZOLE SODIUM 40 MG: 40 TABLET, DELAYED RELEASE ORAL at 06:16

## 2025-02-21 RX ADMIN — Medication 3 MG: at 02:48

## 2025-02-21 RX ADMIN — Medication 4000 UNITS: at 08:54

## 2025-02-21 ASSESSMENT — PAIN - FUNCTIONAL ASSESSMENT
PAIN_FUNCTIONAL_ASSESSMENT: 0-10

## 2025-02-21 ASSESSMENT — ENCOUNTER SYMPTOMS
AGITATION: 0
BLOOD IN STOOL: 0
PALPITATIONS: 0
ABDOMINAL DISTENTION: 1
DIAPHORESIS: 0
ABDOMINAL PAIN: 1
CHEST TIGHTNESS: 1
CHILLS: 1
NAUSEA: 1
NECK PAIN: 0
FACIAL SWELLING: 0
HEADACHES: 0
BACK PAIN: 0
WHEEZING: 0
HALLUCINATIONS: 0
FATIGUE: 1
NERVOUS/ANXIOUS: 0
CONFUSION: 0
FEVER: 0
VOMITING: 0
CONSTIPATION: 0
DYSURIA: 0
DIZZINESS: 0
JOINT SWELLING: 0
CHOKING: 0
SHORTNESS OF BREATH: 0
WEAKNESS: 0
COUGH: 0
NAUSEA: 0
SORE THROAT: 0
CHILLS: 0
VOMITING: 1
WOUND: 0
HEMATURIA: 0
DIARRHEA: 0
LIGHT-HEADEDNESS: 0

## 2025-02-21 ASSESSMENT — COGNITIVE AND FUNCTIONAL STATUS - GENERAL
PERSONAL GROOMING: A LITTLE
TOILETING: A LOT
STANDING UP FROM CHAIR USING ARMS: A LITTLE
WALKING IN HOSPITAL ROOM: A LOT
PERSONAL GROOMING: A LITTLE
CLIMB 3 TO 5 STEPS WITH RAILING: A LOT
MOVING TO AND FROM BED TO CHAIR: A LOT
DAILY ACTIVITIY SCORE: 19
DRESSING REGULAR LOWER BODY CLOTHING: A LITTLE
CLIMB 3 TO 5 STEPS WITH RAILING: A LITTLE
DAILY ACTIVITIY SCORE: 15
HELP NEEDED FOR BATHING: A LITTLE
HELP NEEDED FOR BATHING: A LOT
MOBILITY SCORE: 19
TOILETING: A LITTLE
DAILY ACTIVITIY SCORE: 19
TOILETING: A LITTLE
TURNING FROM BACK TO SIDE WHILE IN FLAT BAD: A LITTLE
TURNING FROM BACK TO SIDE WHILE IN FLAT BAD: A LITTLE
MOVING FROM LYING ON BACK TO SITTING ON SIDE OF FLAT BED WITH BEDRAILS: A LITTLE
TURNING FROM BACK TO SIDE WHILE IN FLAT BAD: A LITTLE
MOVING TO AND FROM BED TO CHAIR: A LITTLE
PERSONAL GROOMING: A LITTLE
DRESSING REGULAR UPPER BODY CLOTHING: A LITTLE
MOBILITY SCORE: 19
HELP NEEDED FOR BATHING: A LITTLE
DRESSING REGULAR LOWER BODY CLOTHING: A LOT
DRESSING REGULAR LOWER BODY CLOTHING: A LITTLE
WALKING IN HOSPITAL ROOM: A LITTLE
MOVING TO AND FROM BED TO CHAIR: A LITTLE
WALKING IN HOSPITAL ROOM: A LITTLE
EATING MEALS: A LITTLE
CLIMB 3 TO 5 STEPS WITH RAILING: A LITTLE
DRESSING REGULAR UPPER BODY CLOTHING: A LITTLE
STANDING UP FROM CHAIR USING ARMS: A LITTLE
MOBILITY SCORE: 14
DRESSING REGULAR UPPER BODY CLOTHING: A LITTLE
STANDING UP FROM CHAIR USING ARMS: A LOT

## 2025-02-21 ASSESSMENT — PAIN DESCRIPTION - LOCATION
LOCATION: ABDOMEN
LOCATION: ABDOMEN

## 2025-02-21 ASSESSMENT — ACTIVITIES OF DAILY LIVING (ADL): HOME_MANAGEMENT_TIME_ENTRY: 13

## 2025-02-21 ASSESSMENT — PAIN DESCRIPTION - ORIENTATION: ORIENTATION: RIGHT

## 2025-02-21 ASSESSMENT — PAIN SCALES - GENERAL
PAINLEVEL_OUTOF10: 0 - NO PAIN
PAINLEVEL_OUTOF10: 5 - MODERATE PAIN
PAINLEVEL_OUTOF10: 7
PAINLEVEL_OUTOF10: 0 - NO PAIN
PAINLEVEL_OUTOF10: 10 - WORST POSSIBLE PAIN
PAINLEVEL_OUTOF10: 6
PAINLEVEL_OUTOF10: 4

## 2025-02-21 ASSESSMENT — PAIN SCALES - PAIN ASSESSMENT IN ADVANCED DEMENTIA (PAINAD): TOTALSCORE: MEDICATION (SEE MAR)

## 2025-02-21 NOTE — ASSESSMENT & PLAN NOTE
RUQ pain due to cholecystitis  Continue IV zosyn  Surgery to see, has consulted IR for cholecystotomy tube  NPO x meds  2/19: IR unable to place due to due to anatomy. GS will watch overnight, if worsens may need to go to OR. Continue antibiotics. Keep NPO.  2/20: Some improvement in WBC and Cr. Doubt will need emergent surgery at this point. Will transfer to med-surg. Continue iv antibiotics for now. Reassess in AM.  2/21: Currently on Zosyn and slow improvement.  Still has right upper quadrant tenderness.  Will need to follow-up with general surgery as outpatient.  Consider switching to Unasyn.

## 2025-02-21 NOTE — CARE PLAN
The patient's goals for the shift include    Problem: Pain  Goal: Takes deep breaths with improved pain control throughout the shift  Outcome: Progressing  Goal: Turns in bed with improved pain control throughout the shift  Outcome: Progressing  Goal: Walks with improved pain control throughout the shift  Outcome: Progressing  Goal: Performs ADL's with improved pain control throughout shift  Outcome: Progressing  Goal: Participates in PT with improved pain control throughout the shift  Outcome: Progressing  Goal: Free from opioid side effects throughout the shift  Outcome: Progressing  Goal: Free from acute confusion related to pain meds throughout the shift  Outcome: Progressing     Problem: Safety - Adult  Goal: Free from fall injury  Outcome: Progressing     Problem: Discharge Planning  Goal: Discharge to home or other facility with appropriate resources  Outcome: Progressing     Problem: Chronic Conditions and Co-morbidities  Goal: Patient's chronic conditions and co-morbidity symptoms are monitored and maintained or improved  Outcome: Progressing     Problem: Nutrition  Goal: Nutrient intake appropriate for maintaining nutritional needs  Outcome: Progressing     Problem: Skin  Goal: Decreased wound size/increased tissue granulation at next dressing change  Outcome: Progressing  Goal: Participates in plan/prevention/treatment measures  Outcome: Progressing  Flowsheets (Taken 2/21/2025 1432)  Participates in plan/prevention/treatment measures: Elevate heels  Goal: Prevent/manage excess moisture  Outcome: Progressing  Flowsheets (Taken 2/21/2025 1432)  Prevent/manage excess moisture:   Monitor for/manage infection if present   Cleanse incontinence/protect with barrier cream  Goal: Prevent/minimize sheer/friction injuries  Outcome: Progressing  Flowsheets (Taken 2/21/2025 1432)  Prevent/minimize sheer/friction injuries:   Use pull sheet   Turn/reposition every 2 hours/use positioning/transfer devices  Goal:  Promote/optimize nutrition  Outcome: Progressing  Flowsheets (Taken 2/21/2025 1432)  Promote/optimize nutrition:   Monitor/record intake including meals   Consume > 50% meals/supplements   Offer water/supplements/favorite foods  Goal: Promote skin healing  Outcome: Progressing     Problem: Diabetes  Goal: Achieve decreasing blood glucose levels by end of shift  Outcome: Progressing  Goal: Increase stability of blood glucose readings by end of shift  Outcome: Progressing  Goal: Decrease in ketones present in urine by end of shift  Outcome: Progressing  Goal: Maintain electrolyte levels within acceptable range throughout shift  Outcome: Progressing  Goal: Maintain glucose levels >70mg/dl to <250mg/dl throughout shift  Outcome: Progressing  Goal: No changes in neurological exam by end of shift  Outcome: Progressing  Goal: Learn about and adhere to nutrition recommendations by end of shift  Outcome: Progressing  Goal: Vital signs within normal range for age by end of shift  Outcome: Progressing  Goal: Increase self care and/or family involovement by end of shift  Outcome: Progressing  Goal: Receive DSME education by end of shift  Outcome: Progressing     Problem: Heart Failure  Goal: Improved gas exchange this shift  Outcome: Progressing  Goal: Improved urinary output this shift  Outcome: Progressing  Goal: Reduction in peripheral edema within 24 hours  Outcome: Progressing  Goal: Report improvement of dyspnea/breathlessness this shift  Outcome: Progressing  Goal: Weight from fluid excess reduced over 2-3 days, then stabilize  Outcome: Progressing  Goal: Increase self care and/or family involvement in 24 hours  Outcome: Progressing       The clinical goals for the shift include no abd pain throughout shift.    See assessment and mar. Remains on room air. See blood sugars. Tele as ordered.

## 2025-02-21 NOTE — PROGRESS NOTES
[phone] for Re[facility]. No Weekend discharge due to [facility] not [facility] over [facility].

## 2025-02-21 NOTE — PROGRESS NOTES
"Occupational Therapy    OT Treatment    Patient Name: Román Marinelli  MRN: [id number]  Department: [facility] 3 E  Room: [address] [place], [place] [address]  Today's Date: 2/21/2025           Assessment:  Barriers to Discharge Home: Physical needs  End of Session Communication: Bedside nurse (updated on pt. spo2 sats decreasing with mobility on RA she is aware ot patient need of NC 2lts when up walking)  End of Session Patient Position: Bed, 3 rail up, Alarm on (call light in hand)     Plan:  Treatment Interventions: ADL retraining, Functional transfer training, UE strengthening/ROM, Endurance training, Compensatory technique education, Neuromuscular reeducation      Subjective \" I just want to go home and get a shower. \"  Previous Visit Info:  OT Last Visit  OT Received On: 02/21/25  General:  General  Prior to Session Communication: Bedside nurse (ok to treat)  Patient Position Received: Bed, 3 rail up, Alarm on  General Comment: pt. agreeeble to OT. Transfers and bed mobility MIN A , pt. is impulsive and drags LLE cues for improving quality of mobility. No loss of balance.  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     Date/Time Vitals Session Patient Position Pulse Resp SpO2 BP MAP (mmHg)    02/21/25 1558 During OT  --  --  --  --  --  --                 Pain:  Pain Assessment  Pain Assessment: 0-10  0-10 (Numeric) Pain Score: 0 - No pain    Objective    Cognition:  Cognition  Overall Cognitive Status: Within Functional Limits (continued to state he wanted to go homr but has poor insight to his medical and physical deficits at this time)  Coordination:     Activities of Daily Living: Toileting  Toileting Level of Assistance: Maximum assistance  Where Assessed: Toilet  Functional Standing Tolerance:     Bed Mobility/Transfers: Bed Mobility  Bed Mobility: Yes  Bed Mobility 1  Bed Mobility 1: Supine to sitting  Level of Assistance 1: Minimum assistance  Bed Mobility 2  Bed Mobility  2: Sitting to supine  Level " of Assistance 2: Minimum assistance  Bed Mobility Comments 2: cueing for safety pt. plopped on bed with poor insight to safety  Bed Mobility 3  Bed Mobility 3: Scooting  Level of Assistance 3: Minimum assistance  Bed Mobility Comments 3: cueing for tech and centering self in bed.    Transfers  Transfer: Yes  Transfer 1  Transfer From 1: Bed to  Transfer to 1: Toilet  Technique 1: Sit to stand, Stand to sit  Transfer Device 1: Quad cane  Transfer Level of Assistance 1: Minimum assistance  Trials/Comments 1: pt. instructed on sequencing cane for improved balance when walking to bathroom  Transfers 2  Transfer From 2: Toilet to  Transfer to 2: Bed  Technique 2: Sit to stand, Stand to sit  Transfer Device 2: Quad cane  Transfer Level of Assistance 2: Minimum assistance  Trials/Comments 2: cues for sequencing cane and safety when sitting on bed to reach back and lower self with control . Pt. is a fall risk       Sitting Balance:  Static Sitting Balance  Static Sitting-Balance Support: Feet unsupported  Static Sitting-Level of Assistance: Contact guard  Static Sitting-Comment/Number of Minutes: 5 mins              Outcome Measures:[facility] Daily Activity  Putting on and taking off regular lower body clothing: A lot  Bathing (including washing, rinsing, drying): A lot  Putting on and taking off regular upper body clothing: A little  Toileting, which includes using toilet, bedpan or urinal: A lot  Taking care of personal grooming such as brushing teeth: A little  Eating Meals: A little  Daily Activity - Total Score: 15        Education Documentation  Precautions, taught by BRUCE Worrell at 2/21/2025  4:44 PM.  Learner: Patient  Readiness: Acceptance  Method: Explanation  Response: Verbalizes Understanding, Needs Reinforcement, Demonstrated Understanding    Body Mechanics, taught by BRUCE Worrell at 2/21/2025  4:44 PM.  Learner: Patient  Readiness: Acceptance  Method: Explanation  Response: Verbalizes  Understanding, Needs Reinforcement, Demonstrated Understanding    ADL Training, taught by BRUCE Worrell at 2/21/2025  4:44 PM.  Learner: Patient  Readiness: Acceptance  Method: Explanation  Response: Verbalizes Understanding, Needs Reinforcement, Demonstrated Understanding    Education Comments  No comments found.        OP EDUCATION:       Goals:  Encounter Problems       Encounter Problems (Active)       ADLs       Patient with complete lower body dressing with stand by assist level of assistance donning and doffing all LE clothes  with PRN adaptive equipment while edge of bed  (Progressing)       Start:  02/19/25    Expected End:  03/05/25            Patient will complete toileting including hygiene clothing management/hygiene with stand by assist level of assistance and PRN AE. (Progressing)       Start:  02/19/25    Expected End:  03/05/25               MOBILITY       Patient will perform Functional mobility  Household distances with stand by assist level of assistance and least restrictive device in order to improve safety and functional mobility. (Progressing)       Start:  02/19/25    Expected End:  03/05/25               TRANSFERS       Patient will complete functional transfers with least restrictive device with stand by assist level of assistance. (Progressing)       Start:  02/19/25    Expected End:  03/05/25

## 2025-02-21 NOTE — ASSESSMENT & PLAN NOTE
CAD w elevated troponin  Check UDS given hx cocaine use  Pt not taking meds, will consult cardiology  Continue beta blocker if UDS negative for cocaine  2/19: Awaiting cards consult.  2/20: Revised cardiac risk index appears to be about 4 (For intraperitoneal surgery, hx CHF, MI, occasional insulin), risk of major cardiac event is 15%. Does not appear to be in active CHF or coronary ischemia. Appreciate cards input.  2/21: Asymptomatic.

## 2025-02-21 NOTE — CARE PLAN
Problem: Pain  Goal: Takes deep breaths with improved pain control throughout the shift  Outcome: Progressing  Goal: Turns in bed with improved pain control throughout the shift  Outcome: Progressing  Goal: Walks with improved pain control throughout the shift  Outcome: Progressing  Goal: Performs ADL's with improved pain control throughout shift  Outcome: Progressing  Goal: Participates in PT with improved pain control throughout the shift  Outcome: Progressing  Goal: Free from opioid side effects throughout the shift  Outcome: Progressing  Goal: Free from acute confusion related to pain meds throughout the shift  Outcome: Progressing     Problem: Safety - Adult  Goal: Free from fall injury  Outcome: Progressing     Problem: Discharge Planning  Goal: Discharge to home or other facility with appropriate resources  Outcome: Progressing     Problem: Chronic Conditions and Co-morbidities  Goal: Patient's chronic conditions and co-morbidity symptoms are monitored and maintained or improved  Outcome: Progressing     Problem: Nutrition  Goal: Nutrient intake appropriate for maintaining nutritional needs  Outcome: Progressing     Problem: Skin  Goal: Decreased wound size/increased tissue granulation at next dressing change  Outcome: Progressing  Flowsheets (Taken 2/20/2025 1933)  Decreased wound size/increased tissue granulation at next dressing change:   Promote sleep for wound healing   Protective dressings over bony prominences  Goal: Participates in plan/prevention/treatment measures  Outcome: Progressing  Flowsheets (Taken 2/20/2025 1933)  Participates in plan/prevention/treatment measures:   Discuss with provider PT/OT consult   Elevate heels   Increase activity/out of bed for meals  Goal: Prevent/manage excess moisture  Outcome: Progressing  Flowsheets (Taken 2/20/2025 1933)  Prevent/manage excess moisture:   Cleanse incontinence/protect with barrier cream   Monitor for/manage infection if present   Moisturize dry  skin  Goal: Prevent/minimize sheer/friction injuries  Outcome: Progressing  Flowsheets (Taken 2/20/2025 1933)  Prevent/minimize sheer/friction injuries:   Increase activity/out of bed for meals   Use pull sheet   Turn/reposition every 2 hours/use positioning/transfer devices  Goal: Promote/optimize nutrition  Outcome: Progressing  Flowsheets (Taken 2/20/2025 1933)  Promote/optimize nutrition:   Monitor/record intake including meals   Assist with feeding   Consume > 50% meals/supplements   Offer water/supplements/favorite foods  Goal: Promote skin healing  Outcome: Progressing  Flowsheets (Taken 2/20/2025 1933)  Promote skin healing:   Assess skin/pad under line(s)/device(s)   Turn/reposition every 2 hours/use positioning/transfer devices     Problem: Diabetes  Goal: Achieve decreasing blood glucose levels by end of shift  Outcome: Progressing  Goal: Increase stability of blood glucose readings by end of shift  Outcome: Progressing  Goal: Decrease in ketones present in urine by end of shift  Outcome: Progressing  Goal: Maintain electrolyte levels within acceptable range throughout shift  Outcome: Progressing  Goal: Maintain glucose levels >70mg/dl to <250mg/dl throughout shift  Outcome: Progressing  Goal: No changes in neurological exam by end of shift  Outcome: Progressing  Goal: Learn about and adhere to nutrition recommendations by end of shift  Outcome: Progressing  Goal: Vital signs within normal range for age by end of shift  Outcome: Progressing  Goal: Increase self care and/or family involovement by end of shift  Outcome: Progressing  Goal: Receive DSME education by end of shift  Outcome: Progressing     Problem: Heart Failure  Goal: Improved gas exchange this shift  Outcome: Progressing  Goal: Improved urinary output this shift  Outcome: Progressing  Goal: Reduction in peripheral edema within 24 hours  Outcome: Progressing  Goal: Report improvement of dyspnea/breathlessness this shift  Outcome:  Progressing  Goal: Weight from fluid excess reduced over 2-3 days, then stabilize  Outcome: Progressing  Goal: Increase self care and/or family involvement in 24 hours  Outcome: Progressing

## 2025-02-21 NOTE — PROGRESS NOTES
Physical Therapy  Physical Therapy Treatment    Patient Name: Román Marinelli  MRN: [id number]  Department: [facility] 3 E  Room: [address] [place] [address]-  Today's Date: 2/21/2025  Time Calculation  Start Time: 0933  Stop Time: 0948  Time Calculation (min): 15 min    PT Plan  Treatment/Interventions: Bed mobility, Transfer training, Gait training, Stair training, Balance training, Strengthening, Endurance training, Therapeutic exercise, Therapeutic activity  PT Plan: Ongoing PT  PT Frequency: 5 times per week  PT Discharge Recommendations: High intensity level of continued care  Equipment Recommended upon Discharge:  (TBD - has WW and WBQC at home.)  PT Recommended Transfer Status: Assist x1  PT - OK to Discharge: Yes (To next level of care when medically cleared.)    General Visit Information:   PT  Visit  PT Received On: 02/21/25  Response to Previous Treatment: Patient with no complaints from previous session.  General  Reason for Referral: Cholecystitis. Impaired mobility.    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    Pain:  6/10 Abdominal pain    Cognition:  Within Functional Limits    Activity Tolerance:  Activity Tolerance  Endurance: Tolerates 10 - 20 min exercise with multiple rests    Treatments:  Bed Mobility  Supine to sitting: Minimum assistance  Sitting to supine: Minimum assistance  Scooting: Minimum assistance    Transfers  Sit to stand: Minimum assistance  Stand to sit: Minimum assistance    Ambulation/Gait Training  10 feet x1 rep, 14 feet x1 rep with SBQC, modA  VC for posture, sequencing, SPQC management   LLE lag, uneven step length, flexed posture        Pt assisted to bed following tx. Alarm on and call light in reach.      Outcome Measures:  [facility] Basic Mobility  Turning from your back to your side while in a flat bed without using bedrails: A little  Moving from lying on your back to sitting on the side of a flat bed without using bedrails: A little  Moving to and from bed to chair (including  a wheelchair): A lot  Standing up from a chair using your arms (e.g. wheelchair or bedside chair): A lot  To walk in hospital room: A lot  Climbing 3-5 steps with railing: A lot  Basic Mobility - Total Score: 14    Education Documentation  Body Mechanics, taught by Forest Samano PTA at 2/21/2025 12:34 PM.  Learner: Patient  Readiness: Acceptance  Method: Explanation, Demonstration  Response: Verbalizes Understanding    Education Comments  No comments found.        OP EDUCATION:       Encounter Problems       Encounter Problems (Active)       To improve strength, balance, endurance, overall mobility:        Patient will participate in B LE exercises x 15 repetitions to sit <> stand with SBA.  (Progressing)       Start:  02/19/25    Expected End:  03/05/25            Patient will complete supine <> sit with SBA.  (Progressing)       Start:  02/19/25    Expected End:  03/05/25            Patient will ambulate 50 feet x 2 with WW vs quad cane with SBA.  (Progressing)       Start:  02/19/25    Expected End:  03/05/25            Patient will negotiate 3 steps with 1 rail and SBA.  (Progressing)       Start:  02/19/25    Expected End:  03/05/25

## 2025-02-21 NOTE — PROGRESS NOTES
"GENERAL SURGERY PROGRESS NOTE    Román Marinelli   1955   [id number]     Román Marinelli is a 69 y.o. male on day 3 of admission presenting with Cholecystitis.      Subjective  Pt complains of continued RUQ pain, improved overall.     Review of Systems   Constitutional:  Negative for chills and fever.   Respiratory:  Negative for cough and shortness of breath.    Cardiovascular:  Negative for chest pain and palpitations.   Gastrointestinal:  Positive for abdominal pain. Negative for blood in stool, constipation, diarrhea, nausea and vomiting.   Neurological:  Negative for dizziness and headaches.   All other systems reviewed and are negative.      Objective    Last Recorded Vitals  Blood pressure 150/80, pulse 98, temperature 36.2 °C (97.1 °F), temperature source Temporal, resp. rate 18, height 1.753 m (5' 9\"), weight 109 kg (239 lb 10.2 oz), SpO2 92%.    Intake/Output last 3 Shifts:  I/O last 3 completed shifts:  In: 450 (4.1 mL/kg) [IV Piggyback:450]  Out: 1025 (9.4 mL/kg) [Urine:1025 (0.3 mL/kg/hr)]  Weight: 108.7 kg     Physical Exam  Constitutional:       General: He is not in acute distress.     Appearance: Normal appearance. He is not ill-appearing.   HENT:      Head: Normocephalic and atraumatic.   Cardiovascular:      Rate and Rhythm: Normal rate and regular rhythm.   Pulmonary:      Effort: Pulmonary effort is normal. No respiratory distress.      Breath sounds: Normal breath sounds.   Abdominal:      General: There is no distension.      Palpations: Abdomen is soft.      Tenderness: There is no abdominal tenderness. There is no guarding.      Comments: Nontender on today's examination. Well healed periumbilical scar from surgery as a child   Musculoskeletal:         General: No swelling.   Skin:     General: Skin is warm and dry.   Neurological:      Mental Status: He is alert and oriented to person, place, and time. Mental status is at baseline.   Psychiatric:         Mood and Affect: Mood normal.    "      Behavior: Behavior normal.       Labs  Results for orders placed or performed during the hospital encounter of 02/18/25 (from the past 24 hours)   POCT GLUCOSE   Result Value Ref Range    POCT Glucose 108 (H) 74 - 99 mg/dL   POCT GLUCOSE   Result Value Ref Range    POCT Glucose 103 (H) 74 - 99 mg/dL   POCT GLUCOSE   Result Value Ref Range    POCT Glucose 145 (H) 74 - 99 mg/dL   POCT GLUCOSE   Result Value Ref Range    POCT Glucose 113 (H) 74 - 99 mg/dL   CBC   Result Value Ref Range    WBC 13.2 (H) 4.4 - 11.3 x10*3/uL    nRBC 0.0 0.0 - 0.0 /100 WBCs    RBC 4.62 4.50 - 5.90 x10*6/uL    Hemoglobin 12.9 (L) 13.5 - 17.5 g/dL    Hematocrit 40.6 (L) 41.0 - 52.0 %    MCV 88 80 - 100 fL    MCH 27.9 26.0 - 34.0 pg    MCHC 31.8 (L) 32.0 - 36.0 g/dL    RDW 15.2 (H) 11.5 - 14.5 %    Platelets 261 150 - 450 x10*3/uL   Basic Metabolic Panel   Result Value Ref Range    Glucose 129 (H) 74 - 99 mg/dL    Sodium 135 (L) 136 - 145 mmol/L    Potassium 3.5 3.5 - 5.3 mmol/L    Chloride 104 98 - 107 mmol/L    Bicarbonate 23 21 - 32 mmol/L    Anion Gap 12 10 - 20 mmol/L    Urea Nitrogen 16 6 - 23 mg/dL    Creatinine 1.15 0.50 - 1.30 mg/dL    eGFR 70 >60 mL/min/1.73m*2    Calcium 8.0 (L) 8.6 - 10.3 mg/dL   POCT GLUCOSE   Result Value Ref Range    POCT Glucose 143 (H) 74 - 99 mg/dL       Radiology    IR biliary cholecystostomy  Ultrasound evaluation for percutaneous cholecystostomy tube placement demonstrates unfavorable anatomy and a safe approach could not be identified. No intervention performed.     Performed and dictated at [facility].   MACRO: None   Signed by: Rubio Ch 2/19/2025 9:32 AM Dictation workstation:   [id number]    CT abdomen pelvis w IV contrast  Distended gallbladder demonstrating wall thickening, pericholecystic fluid, and surrounding inflammation, suggesting cholecystitis. If further evaluation is warranted, HIDA scan or ultrasound can be offered. Partial  collapse/consolidation of the right lower lobe. Nonspecific ground-glass opacities as well as areas of atelectasis/scarring in the remaining visible portions of the lung bases. Colonic diverticulosis. Hepatic steatosis.   MACRO: None.   Signed by: Jarrod Strange 2/18/2025 10:27 AM Dictation workstation:   [id number]      Assessment and Plan  Assessment & Plan  Cholecystitis    Diabetes mellitus (Multi)    Hyperlipidemia    Hypertension    CAD (coronary artery disease), native coronary artery    Dehydration    Chronic systolic heart failure    69 y.o. male with acute cholecystitis who is at increased cardiovascular risk due to active cocaine use (urine positive after he was admitted) and medical comorbidities. His anatomy is unfavorable for percutaneous drain placement. He has been clinically improving overall with antibiotic therapy alone. If he continues to do so, we will manage this without surgical intervention and discuss interval cholecystectomy as an outpatient. If he clinically declines, we can consider laparoscopic drain placement. Will continue to follow.    Migdalia Cadet MD, [facility]  General Surgery

## 2025-02-21 NOTE — ASSESSMENT & PLAN NOTE
Last EF found was 35-40% in 2023.  Appears to be on Entresto. Will continue for now.  2/20: Appears euvolemic at present. Recheck in AM.  2/21: Appears to be tolerating Entresto.

## 2025-02-21 NOTE — ASSESSMENT & PLAN NOTE
By records only. Does not appear to be on oral medication.  Last recorded A1c was 6.4 in 2025  Not currently on sliding scale.  2/19: Will place on SSI, may have less glycemic control while ill.  2/21: Glycemic control is pretty good with SSI.

## 2025-02-21 NOTE — PROGRESS NOTES
Román Marinelli is a 69 y.o. male on day 3 of admission presenting with Cholecystitis.    Review of Systems   Constitutional:  Positive for chills and fatigue. Negative for diaphoresis and fever.   HENT:  Negative for congestion, facial swelling, sneezing and sore throat.    Respiratory:  Positive for chest tightness. Negative for cough, choking, shortness of breath and wheezing.    Cardiovascular:  Negative for chest pain, palpitations and leg swelling.   Gastrointestinal:  Positive for abdominal distention, abdominal pain, nausea and vomiting. Negative for constipation and diarrhea.   Genitourinary:  Negative for dysuria, hematuria and urgency.   Musculoskeletal:  Negative for back pain, gait problem, joint swelling and neck pain.   Skin:  Negative for rash and wound.   Neurological:  Negative for syncope, weakness and light-headedness.   Psychiatric/Behavioral:  Negative for agitation, confusion and hallucinations. The patient is not nervous/anxious.    All other systems reviewed and are negative.     Subjective   Román Marinelli is a 69 y.o. male with PMHx s/f cocaine use, coronary artery disease, cardiomyopathy, diabetes type 2, hyperlipidemia presenting with abdominal pain fatigue malaise.  Patient came to hospital Red eye right upper quadrant abdominal pain ongoing for about 3 days.  The pain is becoming progressively worse could not take it anymore.  Patient has also had some vomiting associated with the pain.  He is denying any fevers chills or rigors but has not been eating or drinking very well.  He denies any chest pain cough any sputum production.  Has not had any diarrhea or urinary symptoms.  Emergency department workup demonstrated probable acute cholecystitis General Surgery was consulted patient started on IV Zosyn given IV fluids and hydration.  Case was discussed with the ED provider patient will be admitted length of stay likely to exceed 2 midnights.  Patient will need continued IV antibiotics  interventional procedure with radiology to perform cholecystotomy tube and allow time for antibiotics to treat the underlying infection.     ED Course (Summary):   Vitals on presentation: Temperature 99 heart rate 105 respiratory rate 18 blood pressure 166/112 SpO2 95% room air  Labs: Glucose 169 creatinine 1.43 total bilirubin 1.8 initial troponin 60 9 repeat troponin 67 CBC significant for leukocytosis white blood cell count 21.8 platelets 293  Imaging: CT of abdomen and pelvis showing distended gallbladder demonstrating wall thickening pericholecystic fluid nonspecific groundglass opacities and atelectasis and remaining portion of the lung bases  Interventions: Patient given IV fluids started on IV Zosyn ED provider consulted with general surgery will plan for cholecystotomy tube and admission    2/19: Pt seen. Had received pain medication and is groggy. Does not withdrawal from abdominal exam, no peritoneal signs. IR unable to place perc sugey drain due to bowel loop in the way. GS aware. Will watch pt overnight on iv antibiotics. If worsens may need to accept risk and go to OR. Continue iv antibiotics. Will recheck in AM.    2/20: Pt seen. More awake today. Discussed with GS. Seems to be responding to antibiotics. Started clears. Will transfer to med-surg. Tolerating clears but still has some abdominal pain with po intake. Current plan is to treat overnight and consider eventual release home on oral antibiotics. Will plan sugey as outpatient after completing antibiotics. Reassess in AM.    2/21: Patient seen.  Still has some right upper quadrant pain but appears to be tolerating clears.  Advance diet per general surgery.  Currently on Zosyn.  Consider switching to Unasyn anticipating switch to Augmentin on discharge.  [facility] working on [facility] to SNF, [facility]  Await further input.       Objective     Last Recorded Vitals  /88 (BP Location: Right arm, Patient Position: Lying)   Pulse 73    Temp 37.4 °C (99.3 °F) (Temporal)   Resp 18   Wt 109 kg (239 lb 10.2 oz)   SpO2 93%   Intake/Output last 3 Shifts:    Intake/Output Summary (Last 24 hours) at 2/21/2025 1704  Last data filed at 2/21/2025 1425  Gross per 24 hour   Intake 500 ml   Output 825 ml   Net -325 ml       Admission Weight  Weight: 112 kg (247 lb) (02/18/25 0811)    Daily Weight  02/20/25 : 109 kg (239 lb 10.2 oz)      Physical Exam  Constitutional:       Appearance: He is ill-appearing.   HENT:      Head: Normocephalic and atraumatic.      Nose: Nose normal. No congestion or rhinorrhea.      Mouth/Throat:      Mouth: Mucous membranes are dry.      Pharynx: Oropharynx is clear.   Eyes:      General: No scleral icterus.     Extraocular Movements: Extraocular movements intact.      Pupils: Pupils are equal, round, and reactive to light.   Cardiovascular:      Rate and Rhythm: Normal rate and regular rhythm.      Heart sounds: Normal heart sounds. No murmur heard.     No friction rub. No gallop.   Pulmonary:      Effort: Pulmonary effort is normal.      Breath sounds: Normal breath sounds. No wheezing, rhonchi or rales.   Chest:      Chest wall: No tenderness.   Abdominal:      General: There is no distension.      Palpations: Abdomen is soft.      Tenderness: There is abdominal tenderness. There is no guarding or rebound.   Musculoskeletal:         General: No swelling, tenderness or signs of injury. Normal range of motion.      Cervical back: Normal range of motion.   Skin:     General: Skin is warm and dry.      Coloration: Skin is not jaundiced.      Findings: No bruising, erythema or rash.   Neurological:      General: No focal deficit present.      Mental Status: He is alert and oriented to person, place, and time.          Lab Results  Results for orders placed or performed during the hospital encounter of 02/18/25 (from the past 24 hours)   POCT GLUCOSE   Result Value Ref Range    POCT Glucose 113 (H) 74 - 99 mg/dL   CBC   Result  Value Ref Range    WBC 13.2 (H) 4.4 - 11.3 x10*3/uL    nRBC 0.0 0.0 - 0.0 /100 WBCs    RBC 4.62 4.50 - 5.90 x10*6/uL    Hemoglobin 12.9 (L) 13.5 - 17.5 g/dL    Hematocrit 40.6 (L) 41.0 - 52.0 %    MCV 88 80 - 100 fL    MCH 27.9 26.0 - 34.0 pg    MCHC 31.8 (L) 32.0 - 36.0 g/dL    RDW 15.2 (H) 11.5 - 14.5 %    Platelets 261 150 - 450 x10*3/uL   Basic Metabolic Panel   Result Value Ref Range    Glucose 129 (H) 74 - 99 mg/dL    Sodium 135 (L) 136 - 145 mmol/L    Potassium 3.5 3.5 - 5.3 mmol/L    Chloride 104 98 - 107 mmol/L    Bicarbonate 23 21 - 32 mmol/L    Anion Gap 12 10 - 20 mmol/L    Urea Nitrogen 16 6 - 23 mg/dL    Creatinine 1.15 0.50 - 1.30 mg/dL    eGFR 70 >60 mL/min/1.73m*2    Calcium 8.0 (L) 8.6 - 10.3 mg/dL   POCT GLUCOSE   Result Value Ref Range    POCT Glucose 143 (H) 74 - 99 mg/dL   POCT GLUCOSE   Result Value Ref Range    POCT Glucose 156 (H) 74 - 99 mg/dL   POCT GLUCOSE   Result Value Ref Range    POCT Glucose 114 (H) 74 - 99 mg/dL        Image Results  ECG 12 lead  Sinus tachycardia  Paired ventricular premature complexes  Left atrial enlargement  Left ventricular hypertrophy  Inferior infarct, age indeterminate  Baseline wander in lead(s) V5    Confirmed by Jewel Wilson ([id number]) on 2/19/2025 3:54:02 PM  IR biliary cholecystostomy  Narrative: Interpreted By:  Rubio Ch,   STUDY:  IR BILIARY CHOLECYSTOSTOMY;  2/19/2025 9:10 am      INDICATION:  Signs/Symptoms:perc cholecystostomy tube placement.          COMPARISON:  None.      ACCESSION NUMBER(S):  [id number]      ORDERING CLINICIAN:  RYAN ENGEL      TECHNIQUE:      CONSENT:  The patient/patient's POA/next of kin was informed of the nature of  the proposed procedure. The purposes, alternatives, risks, and  benefits were explained and discussed. All questions were answered  and consent was obtained.      RADIATION EXPOSURE:  None      SEDATION:  None      MEDICATION/CONTRAST:  No additional      TIME OUT:  A time out was performed  immediately prior to procedure start with  the interventional team, correctly identifying the patient name, date  of birth, MRN, procedure, anatomy (including marking of site and  side), patient position, procedure consent form, relevant laboratory  and imaging test results, antibiotic administration, safety  precautions, and procedure-specific equipment needs.      COMPLICATIONS:  No immediate adverse events identified.      FINDINGS:  Patient was brought down for cholecystostomy tube placement as  clinically and radiographically cholecystitis is favored. Due to  duration of symptoms and clinical comorbidities Surgical Services  requested percutaneous cholecystostomy. Review of the patient's CT  demonstrates very difficult anatomy with on shelving of the  gallbladder which is directed superiorly and terminates at the liver  dome. In addition to this the colon is completely transposed over the  anterior margin of the gallbladder. The patient was placed in the  left posterior oblique position and ultrasound evaluation was  performed in order to identify the gallbladder. The gallbladder was  identified, however, the level of which was at the nipple well above  the pleural space boundary. The lung could be seen in the needed  trajectory for cholecystostomy tube placement. Allowing for these  factors, the procedure was terminated.      Impression: Ultrasound evaluation for percutaneous cholecystostomy tube placement  demonstrates unfavorable anatomy and a safe approach could not be  identified. No intervention performed.          Performed and dictated at [facility].      MACRO:  None      Signed by: Rubio Ch 2/19/2025 9:32 AM  Dictation workstation:   [id number]       Assessment/Plan     Assessment & Plan  Cholecystitis  RUQ pain due to cholecystitis  Continue IV zosyn  Surgery to see, has consulted IR for cholecystotomy tube  NPO x meds  2/19: IR unable to place due to due to  anatomy. GS will watch overnight, if worsens may need to go to OR. Continue antibiotics. Keep NPO.  2/20: Some improvement in WBC and Cr. Doubt will need emergent surgery at this point. Will transfer to med-surg. Continue iv antibiotics for now. Reassess in AM.  2/21: Currently on Zosyn and slow improvement.  Still has right upper quadrant tenderness.  Will need to follow-up with general surgery as outpatient.  Consider switching to Unasyn.  Diabetes mellitus (Multi)  By records only. Does not appear to be on oral medication.  Last recorded A1c was 6.4 in 2025  Not currently on sliding scale.  2/19: Will place on SSI, may have less glycemic control while ill.  2/21: Glycemic control is pretty good with SSI.  Hyperlipidemia  Continue statin.  Hypertension  Continue home meds, adjust as needed.  CAD (coronary artery disease), native coronary artery  CAD w elevated troponin  Check UDS given hx cocaine use  Pt not taking meds, will consult cardiology  Continue beta blocker if UDS negative for cocaine  2/19: Awaiting cards consult.  2/20: Revised cardiac risk index appears to be about 4 (For intraperitoneal surgery, hx CHF, MI, occasional insulin), risk of major cardiac event is 15%. Does not appear to be in active CHF or coronary ischemia. Appreciate cards input.  2/21: Asymptomatic.  Dehydration  Dehydration due to acute illness  IV hydration, monitor for overload, last ef 35-40%  2/20: Appears euvolemic at present.  Chronic systolic heart failure  Last EF found was 35-40% in 2023.  Appears to be on Entresto. Will continue for now.  2/20: Appears euvolemic at present. Recheck in AM.  2/21: Appears to be tolerating Entresto.        Faustino Willis MD

## 2025-02-22 ENCOUNTER — ANESTHESIA (OUTPATIENT)
Dept: OPERATING ROOM | Facility: HOSPITAL | Age: 70
End: 2025-02-22
Payer: OTHER GOVERNMENT

## 2025-02-22 ENCOUNTER — ANESTHESIA EVENT (OUTPATIENT)
Dept: OPERATING ROOM | Facility: HOSPITAL | Age: 70
End: 2025-02-22
Payer: OTHER GOVERNMENT

## 2025-02-22 LAB
ALBUMIN SERPL BCP-MCNC: 3 G/DL (ref 3.4–5)
ALP SERPL-CCNC: 118 U/L (ref 33–136)
ALT SERPL W P-5'-P-CCNC: 33 U/L (ref 10–52)
ANION GAP SERPL CALC-SCNC: 10 MMOL/L (ref 10–20)
AST SERPL W P-5'-P-CCNC: 28 U/L (ref 9–39)
BILIRUB SERPL-MCNC: 1.3 MG/DL (ref 0–1.2)
BUN SERPL-MCNC: 10 MG/DL (ref 6–23)
CALCIUM SERPL-MCNC: 8.1 MG/DL (ref 8.6–10.3)
CHLORIDE SERPL-SCNC: 104 MMOL/L (ref 98–107)
CO2 SERPL-SCNC: 24 MMOL/L (ref 21–32)
CREAT SERPL-MCNC: 1.06 MG/DL (ref 0.5–1.3)
EGFRCR SERPLBLD CKD-EPI 2021: 77 ML/MIN/1.73M*2
ERYTHROCYTE [DISTWIDTH] IN BLOOD BY AUTOMATED COUNT: 14.9 % (ref 11.5–14.5)
GLUCOSE BLD MANUAL STRIP-MCNC: 127 MG/DL (ref 74–99)
GLUCOSE BLD MANUAL STRIP-MCNC: 136 MG/DL (ref 74–99)
GLUCOSE BLD MANUAL STRIP-MCNC: 151 MG/DL (ref 74–99)
GLUCOSE SERPL-MCNC: 126 MG/DL (ref 74–99)
HCT VFR BLD AUTO: 36.3 % (ref 41–52)
HGB BLD-MCNC: 11.9 G/DL (ref 13.5–17.5)
MCH RBC QN AUTO: 27.7 PG (ref 26–34)
MCHC RBC AUTO-ENTMCNC: 32.8 G/DL (ref 32–36)
MCV RBC AUTO: 85 FL (ref 80–100)
NRBC BLD-RTO: 0 /100 WBCS (ref 0–0)
PLATELET # BLD AUTO: 281 X10*3/UL (ref 150–450)
POTASSIUM SERPL-SCNC: 3.5 MMOL/L (ref 3.5–5.3)
PROT SERPL-MCNC: 6.1 G/DL (ref 6.4–8.2)
RBC # BLD AUTO: 4.29 X10*6/UL (ref 4.5–5.9)
SODIUM SERPL-SCNC: 134 MMOL/L (ref 136–145)
WBC # BLD AUTO: 16.1 X10*3/UL (ref 4.4–11.3)

## 2025-02-22 PROCEDURE — 2500000004 HC RX 250 GENERAL PHARMACY W/ HCPCS (ALT 636 FOR OP/ED): Performed by: SURGERY

## 2025-02-22 PROCEDURE — 3700000001 HC GENERAL ANESTHESIA TIME - INITIAL BASE CHARGE: Performed by: SURGERY

## 2025-02-22 PROCEDURE — 82947 ASSAY GLUCOSE BLOOD QUANT: CPT

## 2025-02-22 PROCEDURE — 2500000004 HC RX 250 GENERAL PHARMACY W/ HCPCS (ALT 636 FOR OP/ED): Performed by: NURSE ANESTHETIST, CERTIFIED REGISTERED

## 2025-02-22 PROCEDURE — 36415 COLL VENOUS BLD VENIPUNCTURE: CPT | Performed by: INTERNAL MEDICINE

## 2025-02-22 PROCEDURE — 2500000004 HC RX 250 GENERAL PHARMACY W/ HCPCS (ALT 636 FOR OP/ED): Performed by: INTERNAL MEDICINE

## 2025-02-22 PROCEDURE — 80053 COMPREHEN METABOLIC PANEL: CPT | Performed by: INTERNAL MEDICINE

## 2025-02-22 PROCEDURE — 2720000007 HC OR 272 NO HCPCS: Performed by: SURGERY

## 2025-02-22 PROCEDURE — 47562 LAPAROSCOPIC CHOLECYSTECTOMY: CPT | Performed by: SURGERY

## 2025-02-22 PROCEDURE — 3600000003 HC OR TIME - INITIAL BASE CHARGE - PROCEDURE LEVEL THREE: Performed by: SURGERY

## 2025-02-22 PROCEDURE — 7100000002 HC RECOVERY ROOM TIME - EACH INCREMENTAL 1 MINUTE: Performed by: SURGERY

## 2025-02-22 PROCEDURE — 3600000008 HC OR TIME - EACH INCREMENTAL 1 MINUTE - PROCEDURE LEVEL THREE: Performed by: SURGERY

## 2025-02-22 PROCEDURE — 2500000005 HC RX 250 GENERAL PHARMACY W/O HCPCS: Performed by: ANESTHESIOLOGY

## 2025-02-22 PROCEDURE — 99233 SBSQ HOSP IP/OBS HIGH 50: CPT | Performed by: SURGERY

## 2025-02-22 PROCEDURE — 2500000004 HC RX 250 GENERAL PHARMACY W/ HCPCS (ALT 636 FOR OP/ED): Mod: JZ | Performed by: ANESTHESIOLOGY

## 2025-02-22 PROCEDURE — 3700000002 HC GENERAL ANESTHESIA TIME - EACH INCREMENTAL 1 MINUTE: Performed by: SURGERY

## 2025-02-22 PROCEDURE — 87075 CULTR BACTERIA EXCEPT BLOOD: CPT | Mod: PORLAB | Performed by: SURGERY

## 2025-02-22 PROCEDURE — 88304 TISSUE EXAM BY PATHOLOGIST: CPT | Mod: TC,PORLAB | Performed by: SURGERY

## 2025-02-22 PROCEDURE — 2500000005 HC RX 250 GENERAL PHARMACY W/O HCPCS: Performed by: INTERNAL MEDICINE

## 2025-02-22 PROCEDURE — 2500000001 HC RX 250 WO HCPCS SELF ADMINISTERED DRUGS (ALT 637 FOR MEDICARE OP): Performed by: INTERNAL MEDICINE

## 2025-02-22 PROCEDURE — 87205 SMEAR GRAM STAIN: CPT | Mod: PORLAB | Performed by: SURGERY

## 2025-02-22 PROCEDURE — 0FB44ZZ EXCISION OF GALLBLADDER, PERCUTANEOUS ENDOSCOPIC APPROACH: ICD-10-PCS | Performed by: SURGERY

## 2025-02-22 PROCEDURE — 85027 COMPLETE CBC AUTOMATED: CPT | Performed by: INTERNAL MEDICINE

## 2025-02-22 PROCEDURE — 7100000001 HC RECOVERY ROOM TIME - INITIAL BASE CHARGE: Performed by: SURGERY

## 2025-02-22 PROCEDURE — 1100000001 HC PRIVATE ROOM DAILY

## 2025-02-22 PROCEDURE — 99233 SBSQ HOSP IP/OBS HIGH 50: CPT | Performed by: INTERNAL MEDICINE

## 2025-02-22 PROCEDURE — 2500000005 HC RX 250 GENERAL PHARMACY W/O HCPCS: Performed by: SURGERY

## 2025-02-22 PROCEDURE — 2500000005 HC RX 250 GENERAL PHARMACY W/O HCPCS: Performed by: NURSE ANESTHETIST, CERTIFIED REGISTERED

## 2025-02-22 RX ORDER — LIDOCAINE HYDROCHLORIDE 10 MG/ML
0.1 INJECTION, SOLUTION EPIDURAL; INFILTRATION; INTRACAUDAL; PERINEURAL ONCE
Status: DISCONTINUED | OUTPATIENT
Start: 2025-02-22 | End: 2025-02-22 | Stop reason: HOSPADM

## 2025-02-22 RX ORDER — FENTANYL CITRATE 50 UG/ML
INJECTION, SOLUTION INTRAMUSCULAR; INTRAVENOUS AS NEEDED
Status: DISCONTINUED | OUTPATIENT
Start: 2025-02-22 | End: 2025-02-22

## 2025-02-22 RX ORDER — SODIUM CHLORIDE 0.9 G/100ML
INJECTION, SOLUTION IRRIGATION AS NEEDED
Status: DISCONTINUED | OUTPATIENT
Start: 2025-02-22 | End: 2025-02-22 | Stop reason: HOSPADM

## 2025-02-22 RX ORDER — ROCURONIUM BROMIDE 10 MG/ML
INJECTION, SOLUTION INTRAVENOUS AS NEEDED
Status: DISCONTINUED | OUTPATIENT
Start: 2025-02-22 | End: 2025-02-22

## 2025-02-22 RX ORDER — HYDROMORPHONE HYDROCHLORIDE 1 MG/ML
1 INJECTION, SOLUTION INTRAMUSCULAR; INTRAVENOUS; SUBCUTANEOUS EVERY 5 MIN PRN
Status: DISCONTINUED | OUTPATIENT
Start: 2025-02-22 | End: 2025-02-22 | Stop reason: HOSPADM

## 2025-02-22 RX ORDER — PROPOFOL 10 MG/ML
INJECTION, EMULSION INTRAVENOUS AS NEEDED
Status: DISCONTINUED | OUTPATIENT
Start: 2025-02-22 | End: 2025-02-22

## 2025-02-22 RX ORDER — SODIUM CHLORIDE, SODIUM LACTATE, POTASSIUM CHLORIDE, CALCIUM CHLORIDE 600; 310; 30; 20 MG/100ML; MG/100ML; MG/100ML; MG/100ML
100 INJECTION, SOLUTION INTRAVENOUS CONTINUOUS
Status: DISCONTINUED | OUTPATIENT
Start: 2025-02-22 | End: 2025-02-22 | Stop reason: HOSPADM

## 2025-02-22 RX ORDER — SODIUM CHLORIDE, SODIUM LACTATE, POTASSIUM CHLORIDE, CALCIUM CHLORIDE 600; 310; 30; 20 MG/100ML; MG/100ML; MG/100ML; MG/100ML
100 INJECTION, SOLUTION INTRAVENOUS CONTINUOUS
Status: DISCONTINUED | OUTPATIENT
Start: 2025-02-22 | End: 2025-02-23

## 2025-02-22 RX ORDER — PHENYLEPHRINE HCL IN 0.9% NACL 0.4MG/10ML
SYRINGE (ML) INTRAVENOUS AS NEEDED
Status: DISCONTINUED | OUTPATIENT
Start: 2025-02-22 | End: 2025-02-22

## 2025-02-22 RX ORDER — LIDOCAINE HYDROCHLORIDE 20 MG/ML
INJECTION, SOLUTION INFILTRATION; PERINEURAL AS NEEDED
Status: DISCONTINUED | OUTPATIENT
Start: 2025-02-22 | End: 2025-02-22

## 2025-02-22 RX ORDER — NORETHINDRONE AND ETHINYL ESTRADIOL 0.5-0.035
KIT ORAL AS NEEDED
Status: DISCONTINUED | OUTPATIENT
Start: 2025-02-22 | End: 2025-02-22

## 2025-02-22 RX ORDER — BUPIVACAINE HYDROCHLORIDE 2.5 MG/ML
INJECTION, SOLUTION EPIDURAL; INFILTRATION; INTRACAUDAL AS NEEDED
Status: DISCONTINUED | OUTPATIENT
Start: 2025-02-22 | End: 2025-02-22 | Stop reason: HOSPADM

## 2025-02-22 RX ADMIN — SODIUM CHLORIDE, POTASSIUM CHLORIDE, SODIUM LACTATE AND CALCIUM CHLORIDE: 600; 310; 30; 20 INJECTION, SOLUTION INTRAVENOUS at 14:21

## 2025-02-22 RX ADMIN — ROCURONIUM BROMIDE 50 MG: 10 INJECTION, SOLUTION INTRAVENOUS at 13:12

## 2025-02-22 RX ADMIN — METOPROLOL TARTRATE 25 MG: 25 TABLET, FILM COATED ORAL at 21:57

## 2025-02-22 RX ADMIN — ASPIRIN 81 MG: 81 TABLET, COATED ORAL at 08:46

## 2025-02-22 RX ADMIN — PIPERACILLIN SODIUM AND TAZOBACTAM SODIUM 3.38 G: 3; .375 INJECTION, SOLUTION INTRAVENOUS at 08:46

## 2025-02-22 RX ADMIN — SACUBITRIL AND VALSARTAN 1 TABLET: 24; 26 TABLET, FILM COATED ORAL at 21:57

## 2025-02-22 RX ADMIN — SUGAMMADEX 200 MG: 100 INJECTION, SOLUTION INTRAVENOUS at 15:05

## 2025-02-22 RX ADMIN — PIPERACILLIN SODIUM AND TAZOBACTAM SODIUM 3.38 G: 3; .375 INJECTION, SOLUTION INTRAVENOUS at 02:36

## 2025-02-22 RX ADMIN — SODIUM CHLORIDE, POTASSIUM CHLORIDE, SODIUM LACTATE AND CALCIUM CHLORIDE 100 ML/HR: 600; 310; 30; 20 INJECTION, SOLUTION INTRAVENOUS at 10:38

## 2025-02-22 RX ADMIN — ROCURONIUM BROMIDE 20 MG: 10 INJECTION, SOLUTION INTRAVENOUS at 13:48

## 2025-02-22 RX ADMIN — FENTANYL CITRATE 50 MCG: 50 INJECTION INTRAMUSCULAR; INTRAVENOUS at 13:42

## 2025-02-22 RX ADMIN — FENTANYL CITRATE 50 MCG: 50 INJECTION INTRAMUSCULAR; INTRAVENOUS at 13:12

## 2025-02-22 RX ADMIN — PIPERACILLIN SODIUM AND TAZOBACTAM SODIUM 3.38 G: 3; .375 INJECTION, SOLUTION INTRAVENOUS at 13:00

## 2025-02-22 RX ADMIN — MORPHINE SULFATE 4 MG: 4 INJECTION, SOLUTION INTRAMUSCULAR; INTRAVENOUS at 22:28

## 2025-02-22 RX ADMIN — METOPROLOL TARTRATE 25 MG: 25 TABLET, FILM COATED ORAL at 08:46

## 2025-02-22 RX ADMIN — Medication 2 L/MIN: at 15:58

## 2025-02-22 RX ADMIN — SODIUM CHLORIDE, POTASSIUM CHLORIDE, SODIUM LACTATE AND CALCIUM CHLORIDE: 600; 310; 30; 20 INJECTION, SOLUTION INTRAVENOUS at 13:00

## 2025-02-22 RX ADMIN — ISOSORBIDE MONONITRATE 30 MG: 30 TABLET, EXTENDED RELEASE ORAL at 08:46

## 2025-02-22 RX ADMIN — ONDANSETRON 4 MG: 2 INJECTION INTRAMUSCULAR; INTRAVENOUS at 14:39

## 2025-02-22 RX ADMIN — Medication 100 MCG: at 13:22

## 2025-02-22 RX ADMIN — PANTOPRAZOLE SODIUM 40 MG: 40 TABLET, DELAYED RELEASE ORAL at 06:29

## 2025-02-22 RX ADMIN — Medication 80 MCG: at 13:18

## 2025-02-22 RX ADMIN — EPHEDRINE SULFATE 20 MG: 50 INJECTION, SOLUTION INTRAVENOUS at 13:27

## 2025-02-22 RX ADMIN — HEPARIN SODIUM 5000 UNITS: 5000 INJECTION, SOLUTION INTRAVENOUS; SUBCUTANEOUS at 00:41

## 2025-02-22 RX ADMIN — Medication 3 MG: at 22:29

## 2025-02-22 RX ADMIN — LIDOCAINE HYDROCHLORIDE 100 MG: 20 INJECTION, SOLUTION INFILTRATION; PERINEURAL at 13:12

## 2025-02-22 RX ADMIN — Medication 80 MCG: at 13:20

## 2025-02-22 RX ADMIN — Medication 100 MCG: at 14:13

## 2025-02-22 RX ADMIN — PROPOFOL 150 MG: 10 INJECTION, EMULSION INTRAVENOUS at 13:12

## 2025-02-22 RX ADMIN — FENTANYL CITRATE 50 MCG: 50 INJECTION INTRAMUSCULAR; INTRAVENOUS at 13:45

## 2025-02-22 RX ADMIN — Medication 200 MCG: at 14:23

## 2025-02-22 RX ADMIN — EPHEDRINE SULFATE 10 MG: 50 INJECTION, SOLUTION INTRAVENOUS at 13:25

## 2025-02-22 RX ADMIN — ROCURONIUM BROMIDE 10 MG: 10 INJECTION, SOLUTION INTRAVENOUS at 14:16

## 2025-02-22 RX ADMIN — PROPOFOL 50 MG: 10 INJECTION, EMULSION INTRAVENOUS at 13:15

## 2025-02-22 RX ADMIN — HYDROMORPHONE HYDROCHLORIDE 1 MG: 1 INJECTION, SOLUTION INTRAMUSCULAR; INTRAVENOUS; SUBCUTANEOUS at 15:39

## 2025-02-22 SDOH — HEALTH STABILITY: MENTAL HEALTH: CURRENT SMOKER: 0

## 2025-02-22 ASSESSMENT — PAIN - FUNCTIONAL ASSESSMENT
PAIN_FUNCTIONAL_ASSESSMENT: 0-10

## 2025-02-22 ASSESSMENT — COGNITIVE AND FUNCTIONAL STATUS - GENERAL
HELP NEEDED FOR BATHING: A LITTLE
DAILY ACTIVITIY SCORE: 22
CLIMB 3 TO 5 STEPS WITH RAILING: A LOT
DRESSING REGULAR LOWER BODY CLOTHING: A LITTLE
WALKING IN HOSPITAL ROOM: A LITTLE
DAILY ACTIVITIY SCORE: 22
DRESSING REGULAR LOWER BODY CLOTHING: A LITTLE
CLIMB 3 TO 5 STEPS WITH RAILING: A LOT
MOBILITY SCORE: 21
MOBILITY SCORE: 21
HELP NEEDED FOR BATHING: A LITTLE
WALKING IN HOSPITAL ROOM: A LITTLE

## 2025-02-22 ASSESSMENT — PAIN SCALES - GENERAL
PAINLEVEL_OUTOF10: 8
PAINLEVEL_OUTOF10: 0 - NO PAIN
PAINLEVEL_OUTOF10: 0 - NO PAIN
PAINLEVEL_OUTOF10: 5 - MODERATE PAIN
PAINLEVEL_OUTOF10: 8
PAINLEVEL_OUTOF10: 0 - NO PAIN
PAINLEVEL_OUTOF10: 0 - NO PAIN
PAIN_LEVEL: 0
PAINLEVEL_OUTOF10: 0 - NO PAIN

## 2025-02-22 ASSESSMENT — ENCOUNTER SYMPTOMS
ABDOMINAL PAIN: 1
CHEST TIGHTNESS: 0
DIFFICULTY URINATING: 0
NAUSEA: 0
SHORTNESS OF BREATH: 0
FEVER: 0
CHILLS: 0

## 2025-02-22 ASSESSMENT — PAIN DESCRIPTION - LOCATION
LOCATION: ABDOMEN
LOCATION: ABDOMEN

## 2025-02-22 ASSESSMENT — PAIN DESCRIPTION - ORIENTATION: ORIENTATION: RIGHT

## 2025-02-22 NOTE — ANESTHESIA PREPROCEDURE EVALUATION
Patient: Román Marinelli    Procedure Information       Date/Time: 02/22/25 1230    Procedure: LAPAROSCOPY, EXPLORATORY - diagnostic laparoscopy, possible cholecystectomy, possible open    Location: [facility] 01 / Virtual [facility]    Surgeons: Migdalia Cadet MD            Relevant Problems   Cardiac   (+) CAD (coronary artery disease), native coronary artery   (+) Hyperlipidemia   (+) Hypertension   (+) NSTEMI (non-ST elevated myocardial infarction) (Multi)      Neuro   (+) Acute arterial ischemic stroke, multifocal, mult vascular territories (Multi)      Liver   (+) Cholecystitis       Clinical information reviewed:   Tobacco  Allergies  Meds  Problems  Med Hx  Surg Hx   Fam Hx  Soc   Hx        NPO Detail:  NPO/Void Status  Date of Last Liquid: 02/22/25  Time of Last Liquid: 0800  Date of Last Solid:  (about a week.)  Time of Last Solid: 0000 (about a week.)  Last Intake Type: Clear fluids  Time of Last Void: 1000         Physical Exam    Airway  Mallampati: III  TM distance: <3 FB  Neck ROM: full     Cardiovascular - normal exam  Rhythm: regular  Rate: normal     Dental - normal exam     Pulmonary   (+) decreased breath sounds     Abdominal   (+) obese  Abdomen: tender           Anesthesia Plan    History of general anesthesia?: yes  History of complications of general anesthesia?: no    ASA 4 - emergent     general   (Patient + cocaine. States last dose over 1 week ago. Denies other substance abuse)  The patient is not a current smoker.    intravenous induction   Anesthetic plan and risks discussed with patient.    Plan discussed with CRNA.

## 2025-02-22 NOTE — PROGRESS NOTES
Physical Therapy                 Therapy Communication Note    Patient Name: Román Marinelli  MRN: [id number]  Department: [facility] 3 E  Room: [address] [place], [place] [address]  Today's Date: 2/22/2025     Discipline: Physical Therapy    PT Missed Visit: Yes     Missed Visit Reason: Missed Visit Reason:  (pt. leaving for sx, exploratory lap colon/gallbladder)    Missed Time: Attempt    Comment:

## 2025-02-22 NOTE — ANESTHESIA POSTPROCEDURE EVALUATION
Patient: Román Marinelli    Procedure Summary       Date: 02/22/25 Room / Location: [facility] 01 / [facility]    Anesthesia Start: 1303 Anesthesia Stop: 1528    Procedure: DIAGNOSITC LAPAROSCOPY, LYSIS OF ADHESION, LAP PARTIAL CHOLEYCYSTECTOMY Diagnosis:       Cholecystitis      (Cholecystitis [K81.9])    Surgeons: Migdalia Cadet MD Responsible Provider: VICKY Maldonado-CRNA    Anesthesia Type: general ASA Status: 4 - Emergent            Anesthesia Type: general    Vitals Value Taken Time   /73 02/22/25 1525   Temp  02/22/25 1528   Pulse 86 02/22/25 1528   Resp 32 02/22/25 1528   SpO2 98 % 02/22/25 1528   Vitals shown include unfiled device data.    Anesthesia Post Evaluation    Patient location during evaluation: PACU  Patient participation: waiting for patient participation  Level of consciousness: lethargic  Pain score: 0  Pain management: adequate  Multimodal analgesia pain management approach  Airway patency: patent  Two or more strategies used to mitigate risk of obstructive sleep apnea  Cardiovascular status: acceptable and blood pressure returned to baseline  Respiratory status: acceptable, face mask and oral airway  Hydration status: acceptable  Postoperative Nausea and Vomiting: none    No notable events documented.

## 2025-02-22 NOTE — OP NOTE
General Laparoscopic partial cholecystectomy, lysis of adhesions Operative Note     Date: 2025 - 2025  OR Location: [facility]    Name: Román Marinelli, : 1955, Age: 69 y.o., MRN: [id number], Sex: male    Diagnosis  Pre-op Diagnosis      * Cholecystitis [K81.9] Post-op Diagnosis     * Cholecystitis [K81.9]  Acute gangrenous cholecystitis     Procedures  Laparoscopic partial cholecystectomy  Lysis of adhesions     Surgeons      * Migdalia Cadet - Primary    Resident/Fellow/Other Assistant:  Surgeons and Role:     * Maria Alejandra Payne DO - Resident - Assisting    Staff:   Circulator: Yoanna Hollis Person: Madai  Surgical Assistant: Gary    Anesthesia Staff: CRNA: VICKY Maldonado-CRNA    Procedure Summary  Anesthesia: General  ASA: IV  Estimated Blood Loss: 20mL  Intra-op Medications:   Administrations occurring from 1310 to 1510 on 25:   Medication Name Total Dose   bupivacaine PF 0.25 % (Marcaine) 0.25 % (2.5 mg/mL) injection 30 mL   ePHEDrine injection 30 mg   fentaNYL (Sublimaze) injection 50 mcg/mL 150 mcg   lactated Ringer's infusion 81.67 mL   lidocaine (Xylocaine) injection 2 % 100 mg   phenylephrine 40 mcg/mL syringe 10 mL 560 mcg   piperacillin-tazobactam (Zosyn) 3.375 g in dextrose (iso) IV 50 mL Cannot be calculated   propofol (Diprivan) injection 10 mg/mL 200 mg   rocuronium (ZeMuron) 50 mg/5 mL injection 80 mg              Anesthesia Record               Intraprocedure I/O Totals       None           Specimen:   ID Type Source Tests Collected by Time   1 : PARTIAL GALLBLADDER Tissue GALLBLADDER CHOLECYSTECTOMY SURGICAL PATHOLOGY EXAM Migdalia Cadet MD 2025 1439       Drains and/or Catheters:   Closed/Suction Drain 1 Lateral RUQ Bulb 10 Fr. (Active)   Dressing Status Clean;Dry 25 1451   Status To bulb suction 25 1451     Findings: ischemic gallbladder     Indications: Román Marinelli is an 69 y.o. male who is having surgery for Cholecystitis [K81.9].     The patient was seen  in the preoperative area. The risks, benefits, complications, treatment options, non-operative alternatives, expected recovery and outcomes were discussed with the patient. The possibilities of reaction to medication, pulmonary aspiration, injury to surrounding structures, bleeding, recurrent infection, the need for additional procedures, failure to diagnose a condition, and creating a complication requiring transfusion or operation were discussed with the patient. The patient concurred with the proposed plan, giving informed consent.  The site of surgery was properly noted/marked if necessary per policy. The patient has been actively warmed in preoperative area. Preoperative antibiotics have been ordered and given within 1 hours of incision. Venous thrombosis prophylaxis have been ordered including bilateral sequential compression devices    Procedure Details:   The patient was taken to the operating room and underwent general anesthesia. Sequential compression devices were placed. Subcutaneous heparin and appropriate antibiotics were administered. The right arm was tucked at the side and all pressure points were padded. The abdomen was prepped and draped in the usual sterile fashion. A time-out was performed with all parties present.     In the left upper quadrant at Candelario’s point, a 5mm incision was made and a Veress needle was placed into the intra-abdominal cavity. There was no fluid on aspiration and the drop test was reassuring. The initial pressure was 6 mmHg and the abdomen was insufflated to 15mmHg without hemodynamic changes. The Veress needle was removed and a 5mm port was placed through the incision in an Optiview fashion. On initial inspection of the intra-abdominal cavity, there were no injuries noted from Veress needle or port placement. In the periumbilical midline, an incision was made and a 12mm bladed trocar was placed into the abdominal cavity under direct visualization. In the right upper  quadrant at the mid-clavicular line a few centimeters below the costal margin, an incision was made and a 5mm port was placed under direct visualization. In the right lateral abdomen at the level of the umbilicus, an incision was made and a 5mm port was placed under direct visualization.     The patient was placed in reverse Trendelenberg with the right side angled upward. We were unable to visualize the right lobe of the liver or gallbladder, as there was significant omentum covering the entire right upper quadrant. On gentle palpation of the tissue, we identified the transverse colon as it was soft and normal in caliber. This was already fairly cephalad and at/above the level of the costal margin. Palpating cephalad to this, we encountered very thickened/firm tissue deep to the fat, likely where the inflamed gallbladder was located. We began the dissection by carefully dissecting the fat bluntly from the underlying tissue, then cauterizing it, as we could not peel it off the gallbladder. We placed the camera through the right upper quadrant port site and used the larger periumbilical port site to place a paddle retractor. Retracting the colon and fatty tissue posteriorly and inferiorly aided in bringing the very high liver/gallbladder closer to the costal margin. We continued to dissect the fatty tissue off of the firm tissue. We were eventually able to identify the edge of the liver lateral to the gallbladder. With further dissection, we were able to visually identify the fundus of the gallbladder. The omentum was able to be bluntly dissected off of part of the body of the gallbladder. However, the omentum was thickened and inflamed enough that it was not able to be fully pulled down below the level of the colon. We visualized the gallbladder at this time. It was very thickened and edematous, consistent with acute cholecystitis. In addition, there were several circles of ischemia/gangrene in the visualized  portion of the distal body and fundus. Due to this, I felt that a drainage tube would be suboptimal treatment. As I was not able to dissect the tissue down to the infundibulum, I considered open surgery. However, with how difficult it was to access this high-riding gallbladder, I felt that laparoscopic partial cholecystectomy was a more practical and feasible option. A Ligasure device was used to enter the gallbladder through one of the areas of ischemia. There was dark, bloody bile noted within. Some of the fluid was sent for culture and the remainder was suctioned. We used the Ligasure to remove the gallbladder wall in a fenestrated fashion. Once the distal half of the gallbladder was removed, we inspected the remaining gallbladder. There were no stones or sludge noted. The gallbladder wall and surrounding tissues were quite thickened and unable to be dissected. I considered removing additional gallbladder wall and dissecting further downward, but felt that was potentially unsafe and not of significant benefit. The partial gallbladder wall was removed through the periumbilical port site using an Endocatch bag. We copiously irrigated the right upper quadrant. There was no active bleeding or obvious bile drainage noted. A 10Fr flat JINA drain was placed into the abdominal cavity and externalized through the right lateral port site. Internally, it was placed right over the remaining gallbladder. Externally, it was secured using 3-0 Nylon.     The abdomen was desufflated and the ports were removed. The fascia of the periumbilical port site was closed using 0 Vicryl in a figure-of-8 fashion. Local anesthesia was administered using 0.25% Marcaine. The incisions were closed with 4-0 Vicryl and sealed with Liquiband. The drain site was covered with gauze and tape.    The patient was extubated and taken to recovery in stable condition. All needle, sponge and instrument counts were correct at the end of the case. I was  present for the entire case.     Due to the patient's very elevated right hemidiaphragm, the gallbladder was quite challenging to reach. Given the patient's comorbidities, severe infection and anatomy, this case was quite challenging and a full cholecystectomy was not possible. This necessitates a difficulty modifier.    Complications:  None; patient tolerated the procedure well.    Disposition: PACU - hemodynamically stable.  Condition: stable     Attending Attestation: I was present and scrubbed for the entire procedure.    Migdalia Cadet  Phone Number: [phone]

## 2025-02-22 NOTE — CARE PLAN
Problem: Pain  Goal: Takes deep breaths with improved pain control throughout the shift  Outcome: Progressing  Goal: Turns in bed with improved pain control throughout the shift  Outcome: Progressing  Goal: Walks with improved pain control throughout the shift  Outcome: Progressing  Goal: Performs ADL's with improved pain control throughout shift  Outcome: Progressing  Goal: Participates in PT with improved pain control throughout the shift  Outcome: Progressing  Goal: Free from opioid side effects throughout the shift  Outcome: Progressing  Goal: Free from acute confusion related to pain meds throughout the shift  Outcome: Progressing     Problem: Safety - Adult  Goal: Free from fall injury  Outcome: Progressing     Problem: Discharge Planning  Goal: Discharge to home or other facility with appropriate resources  Outcome: Progressing     Problem: Chronic Conditions and Co-morbidities  Goal: Patient's chronic conditions and co-morbidity symptoms are monitored and maintained or improved  Outcome: Progressing     Problem: Nutrition  Goal: Nutrient intake appropriate for maintaining nutritional needs  Outcome: Progressing     Problem: Skin  Goal: Decreased wound size/increased tissue granulation at next dressing change  Outcome: Progressing  Flowsheets (Taken 2/21/2025 1928)  Decreased wound size/increased tissue granulation at next dressing change:   Promote sleep for wound healing   Protective dressings over bony prominences  Goal: Participates in plan/prevention/treatment measures  Outcome: Progressing  Flowsheets (Taken 2/21/2025 1928)  Participates in plan/prevention/treatment measures:   Discuss with provider PT/OT consult   Elevate heels   Increase activity/out of bed for meals  Goal: Prevent/manage excess moisture  Outcome: Progressing  Flowsheets (Taken 2/21/2025 1928)  Prevent/manage excess moisture:   Monitor for/manage infection if present   Moisturize dry skin  Goal: Prevent/minimize sheer/friction  injuries  Outcome: Progressing  Flowsheets (Taken 2/21/2025 1928)  Prevent/minimize sheer/friction injuries:   Increase activity/out of bed for meals   Turn/reposition every 2 hours/use positioning/transfer devices  Goal: Promote/optimize nutrition  Outcome: Progressing  Flowsheets (Taken 2/21/2025 1928)  Promote/optimize nutrition:   Monitor/record intake including meals   Offer water/supplements/favorite foods  Goal: Promote skin healing  Outcome: Progressing  Flowsheets (Taken 2/21/2025 1928)  Promote skin healing:   Protective dressings over bony prominences   Assess skin/pad under line(s)/device(s)     Problem: Diabetes  Goal: Achieve decreasing blood glucose levels by end of shift  Outcome: Progressing  Goal: Increase stability of blood glucose readings by end of shift  Outcome: Progressing  Goal: Decrease in ketones present in urine by end of shift  Outcome: Progressing  Goal: Maintain electrolyte levels within acceptable range throughout shift  Outcome: Progressing  Goal: Maintain glucose levels >70mg/dl to <250mg/dl throughout shift  Outcome: Progressing  Goal: No changes in neurological exam by end of shift  Outcome: Progressing  Goal: Learn about and adhere to nutrition recommendations by end of shift  Outcome: Progressing  Goal: Vital signs within normal range for age by end of shift  Outcome: Progressing  Goal: Increase self care and/or family involovement by end of shift  Outcome: Progressing  Goal: Receive DSME education by end of shift  Outcome: Progressing     Problem: Heart Failure  Goal: Improved gas exchange this shift  Outcome: Progressing  Goal: Improved urinary output this shift  Outcome: Progressing  Goal: Reduction in peripheral edema within 24 hours  Outcome: Progressing  Goal: Report improvement of dyspnea/breathlessness this shift  Outcome: Progressing  Goal: Weight from fluid excess reduced over 2-3 days, then stabilize  Outcome: Progressing  Goal: Increase self care and/or family  involvement in 24 hours  Outcome: Progressing

## 2025-02-22 NOTE — ANESTHESIA PROCEDURE NOTES
Airway  Date/Time: 2/22/2025 1:14 PM  Urgency: emergent    Airway not difficult    Staffing  Performed: CRNA   Authorized by: BARBRA Maldonado    Performed by: BARBRA Maldonado  Patient location during procedure: OR    Consent for Airway (if performed for an anesthetic, see related documentation for consents)  Patient identity confirmed: verbally with patient, arm band, provided demographic data and hospital-assigned identification number  Consent: No emergent situation. Verbal consent obtained. Written consent obtained.  Risks and benefits: risks, benefits and alternatives were discussed  Consent given by: patient      Indications and Patient Condition  Indications for airway management: anesthesia  Spontaneous ventilation: present  Sedation level: deep  Preoxygenated: yes  Patient position: sniffing  MILS maintained throughout  Mask difficulty assessment: 1 - vent by mask    Final Airway Details  Final airway type: endotracheal airway      Successful airway: ETT  Cuffed: yes   Successful intubation technique: direct laryngoscopy  Facilitating devices/methods: intubating stylet  Endotracheal tube insertion site: oral  Blade: Sabas  Blade size: #4  ETT size (mm): 7.5  Cormack-Lehane Classification: grade IIa - partial view of glottis  Placement verified by: chest auscultation and capnometry   Inital cuff pressure (cm H2O): 6  Cuff volume (mL): 4  Measured from: lips  ETT to lips (cm): 22  Number of attempts at approach: 1  Ventilation between attempts: BVM  Number of other approaches attempted: 0

## 2025-02-22 NOTE — PROGRESS NOTES
"GENERAL SURGERY PROGRESS NOTE    Román Marinelli   1955   [id number]     Román Marinelli is a 69 y.o. male on day 4 of admission presenting with Cholecystitis.    Subjective  No acute events overnight.  Patient reports intermittent abdominal pain.  Has been tolerating clear liquids.  Denies fevers or chills.    Review of Systems:  Review of Systems   Constitutional:  Negative for chills and fever.   Respiratory:  Negative for chest tightness and shortness of breath.    Cardiovascular:  Negative for chest pain.   Gastrointestinal:  Positive for abdominal pain. Negative for nausea.   Genitourinary:  Negative for difficulty urinating.   Skin:  Negative for rash.       Objective    Last Recorded Vitals  Blood pressure (!) 164/104, pulse 92, temperature 36.8 °C (98.2 °F), temperature source Temporal, resp. rate 20, height 1.753 m (5' 9\"), weight 109 kg (239 lb 10.2 oz), SpO2 92%.    Intake/Output last 3 Shifts:  I/O last 3 completed shifts:  In: 1200 (11 mL/kg) [P.O.:950; IV Piggyback:250]  Out: 1675 (15.4 mL/kg) [Urine:1675 (0.4 mL/kg/hr)]  Weight: 108.7 kg     Intake/Output Summary (Last 24 hours) at 2/22/2025 1044  Last data filed at 2/22/2025 0929  Gross per 24 hour   Intake 1100 ml   Output 1900 ml   Net -800 ml       Physical Exam  Constitutional:       General: He is not in acute distress.  HENT:      Head: Normocephalic.   Eyes:      General: No scleral icterus.  Cardiovascular:      Rate and Rhythm: Regular rhythm. Tachycardia present.      Pulses: Normal pulses.   Pulmonary:      Effort: Pulmonary effort is normal. No respiratory distress.   Abdominal:      General: There is no distension.      Palpations: Abdomen is soft.      Comments: Tender to palpation in the right upper quadrant.  No peritoneal signs   Musculoskeletal:         General: No swelling.      Cervical back: Neck supple.   Skin:     General: Skin is warm and dry.   Neurological:      Mental Status: He is alert. Mental status is at baseline. "         Relevant Results  Labs:  Results for orders placed or performed during the hospital encounter of 02/18/25 (from the past 24 hours)   POCT GLUCOSE   Result Value Ref Range    POCT Glucose 156 (H) 74 - 99 mg/dL   POCT GLUCOSE   Result Value Ref Range    POCT Glucose 114 (H) 74 - 99 mg/dL   POCT GLUCOSE   Result Value Ref Range    POCT Glucose 171 (H) 74 - 99 mg/dL   Comprehensive Metabolic Panel   Result Value Ref Range    Glucose 126 (H) 74 - 99 mg/dL    Sodium 134 (L) 136 - 145 mmol/L    Potassium 3.5 3.5 - 5.3 mmol/L    Chloride 104 98 - 107 mmol/L    Bicarbonate 24 21 - 32 mmol/L    Anion Gap 10 10 - 20 mmol/L    Urea Nitrogen 10 6 - 23 mg/dL    Creatinine 1.06 0.50 - 1.30 mg/dL    eGFR 77 >60 mL/min/1.73m*2    Calcium 8.1 (L) 8.6 - 10.3 mg/dL    Albumin 3.0 (L) 3.4 - 5.0 g/dL    Alkaline Phosphatase 118 33 - 136 U/L    Total Protein 6.1 (L) 6.4 - 8.2 g/dL    AST 28 9 - 39 U/L    Bilirubin, Total 1.3 (H) 0.0 - 1.2 mg/dL    ALT 33 10 - 52 U/L   CBC   Result Value Ref Range    WBC 16.1 (H) 4.4 - 11.3 x10*3/uL    nRBC 0.0 0.0 - 0.0 /100 WBCs    RBC 4.29 (L) 4.50 - 5.90 x10*6/uL    Hemoglobin 11.9 (L) 13.5 - 17.5 g/dL    Hematocrit 36.3 (L) 41.0 - 52.0 %    MCV 85 80 - 100 fL    MCH 27.7 26.0 - 34.0 pg    MCHC 32.8 32.0 - 36.0 g/dL    RDW 14.9 (H) 11.5 - 14.5 %    Platelets 281 150 - 450 x10*3/uL   POCT GLUCOSE   Result Value Ref Range    POCT Glucose 151 (H) 74 - 99 mg/dL       Images:  IR biliary cholecystostomy   Final Result   Ultrasound evaluation for percutaneous cholecystostomy tube placement   demonstrates unfavorable anatomy and a safe approach could not be   identified. No intervention performed.             Performed and dictated at [facility].        MACRO:   None        Signed by: Rubio Ch 2/19/2025 9:32 AM   Dictation workstation:   [id number]      CT abdomen pelvis w IV contrast   Final Result   Distended gallbladder demonstrating wall thickening,  pericholecystic   fluid, and surrounding inflammation, suggesting cholecystitis. If   further evaluation is warranted, HIDA scan or ultrasound can be   offered. Partial collapse/consolidation of the right lower lobe.   Nonspecific ground-glass opacities as well as areas of   atelectasis/scarring in the remaining visible portions of the lung   bases. Colonic diverticulosis.   Hepatic steatosis.        MACRO:   None.        Signed by: Jarrod Strange 2/18/2025 10:27 AM   Dictation workstation:   [id number]          Assessment and Plan  Assessment & Plan  Cholecystitis    Diabetes mellitus (Multi)    Hyperlipidemia    Hypertension    CAD (coronary artery disease), native coronary artery    Dehydration    Chronic systolic heart failure    69 y.o. male with history of stroke 1 year ago, hypertension, and intermittent cocaine use (last use last week), presenting with 3 days of right upper quadrant abdominal pain with CT imaging demonstrating signs concerning for acute cholecystitis.  Given patient's symptoms being present for send period of time, increase in inflammation will increase risk of surgery. IR was unable to place a PERC cholecystostomy tube due to anatomical barriers.  Have been attempting conservative management however patient is continuing to have right upper quadrant abdominal pain and had an increase in his leukocytosis this a.m.  Will plan for OR today for diagnostic laparoscopy, possible cholecystectomy, possible cholecystostomy tube placement, possible open.    Plan:  - OR for diagnostic laparoscopy, possible cholecystectomy, possible cholecystostomy tube placement, possible open.  - N.p.o., IVF  - Continue IV antibiotics: Zosyn  - Pain control and antinausea medication    Discussed with attending Dr. Helio Payne, DO - PGY4  General Surgery

## 2025-02-22 NOTE — CARE PLAN
The patient's goals for the shift include      The clinical goals for the shift include Patient will be free from abdominal pain this shift.    Over the shift, the patient remains free from abdominal pain. Is resting comfortably at this time.

## 2025-02-23 VITALS
SYSTOLIC BLOOD PRESSURE: 104 MMHG | DIASTOLIC BLOOD PRESSURE: 60 MMHG | RESPIRATION RATE: 18 BRPM | TEMPERATURE: 97.2 F | WEIGHT: 239 LBS | BODY MASS INDEX: 35.4 KG/M2 | HEIGHT: 69 IN | OXYGEN SATURATION: 93 % | HEART RATE: 60 BPM

## 2025-02-23 LAB
ALBUMIN SERPL BCP-MCNC: 2.6 G/DL (ref 3.4–5)
ALP SERPL-CCNC: 105 U/L (ref 33–136)
ALT SERPL W P-5'-P-CCNC: 29 U/L (ref 10–52)
ANION GAP SERPL CALC-SCNC: 9 MMOL/L (ref 10–20)
AST SERPL W P-5'-P-CCNC: 26 U/L (ref 9–39)
BACTERIA FLD CULT: NORMAL
BASOPHILS # BLD AUTO: 0.04 X10*3/UL (ref 0–0.1)
BASOPHILS NFR BLD AUTO: 0.3 %
BILIRUB SERPL-MCNC: 0.7 MG/DL (ref 0–1.2)
BUN SERPL-MCNC: 16 MG/DL (ref 6–23)
CALCIUM SERPL-MCNC: 7.5 MG/DL (ref 8.6–10.3)
CHLORIDE SERPL-SCNC: 104 MMOL/L (ref 98–107)
CO2 SERPL-SCNC: 28 MMOL/L (ref 21–32)
CREAT SERPL-MCNC: 1.64 MG/DL (ref 0.5–1.3)
EGFRCR SERPLBLD CKD-EPI 2021: 46 ML/MIN/1.73M*2
EOSINOPHIL # BLD AUTO: 0.2 X10*3/UL (ref 0–0.7)
EOSINOPHIL NFR BLD AUTO: 1.6 %
ERYTHROCYTE [DISTWIDTH] IN BLOOD BY AUTOMATED COUNT: 15.7 % (ref 11.5–14.5)
GLUCOSE BLD MANUAL STRIP-MCNC: 112 MG/DL (ref 74–99)
GLUCOSE BLD MANUAL STRIP-MCNC: 131 MG/DL (ref 74–99)
GLUCOSE BLD MANUAL STRIP-MCNC: 135 MG/DL (ref 74–99)
GLUCOSE BLD MANUAL STRIP-MCNC: 138 MG/DL (ref 74–99)
GLUCOSE SERPL-MCNC: 109 MG/DL (ref 74–99)
GRAM STN SPEC: NORMAL
GRAM STN SPEC: NORMAL
HCT VFR BLD AUTO: 32.5 % (ref 41–52)
HGB BLD-MCNC: 10.5 G/DL (ref 13.5–17.5)
IMM GRANULOCYTES # BLD AUTO: 0.11 X10*3/UL (ref 0–0.7)
IMM GRANULOCYTES NFR BLD AUTO: 0.9 % (ref 0–0.9)
LYMPHOCYTES # BLD AUTO: 2.28 X10*3/UL (ref 1.2–4.8)
LYMPHOCYTES NFR BLD AUTO: 18.3 %
MCH RBC QN AUTO: 28.2 PG (ref 26–34)
MCHC RBC AUTO-ENTMCNC: 32.3 G/DL (ref 32–36)
MCV RBC AUTO: 87 FL (ref 80–100)
MONOCYTES # BLD AUTO: 1.37 X10*3/UL (ref 0.1–1)
MONOCYTES NFR BLD AUTO: 11 %
NEUTROPHILS # BLD AUTO: 8.44 X10*3/UL (ref 1.2–7.7)
NEUTROPHILS NFR BLD AUTO: 67.9 %
NRBC BLD-RTO: 0 /100 WBCS (ref 0–0)
PLATELET # BLD AUTO: 290 X10*3/UL (ref 150–450)
POTASSIUM SERPL-SCNC: 4.2 MMOL/L (ref 3.5–5.3)
PROT SERPL-MCNC: 5.6 G/DL (ref 6.4–8.2)
RBC # BLD AUTO: 3.72 X10*6/UL (ref 4.5–5.9)
SODIUM SERPL-SCNC: 137 MMOL/L (ref 136–145)
WBC # BLD AUTO: 12.4 X10*3/UL (ref 4.4–11.3)

## 2025-02-23 PROCEDURE — 2500000004 HC RX 250 GENERAL PHARMACY W/ HCPCS (ALT 636 FOR OP/ED): Performed by: INTERNAL MEDICINE

## 2025-02-23 PROCEDURE — 80053 COMPREHEN METABOLIC PANEL: CPT | Performed by: INTERNAL MEDICINE

## 2025-02-23 PROCEDURE — 2500000001 HC RX 250 WO HCPCS SELF ADMINISTERED DRUGS (ALT 637 FOR MEDICARE OP): Performed by: INTERNAL MEDICINE

## 2025-02-23 PROCEDURE — 99233 SBSQ HOSP IP/OBS HIGH 50: CPT | Performed by: INTERNAL MEDICINE

## 2025-02-23 PROCEDURE — 2500000005 HC RX 250 GENERAL PHARMACY W/O HCPCS: Performed by: SURGERY

## 2025-02-23 PROCEDURE — 85025 COMPLETE CBC W/AUTO DIFF WBC: CPT | Performed by: INTERNAL MEDICINE

## 2025-02-23 PROCEDURE — 1100000001 HC PRIVATE ROOM DAILY

## 2025-02-23 PROCEDURE — 2500000001 HC RX 250 WO HCPCS SELF ADMINISTERED DRUGS (ALT 637 FOR MEDICARE OP): Performed by: SURGERY

## 2025-02-23 PROCEDURE — 36415 COLL VENOUS BLD VENIPUNCTURE: CPT | Performed by: INTERNAL MEDICINE

## 2025-02-23 PROCEDURE — 82947 ASSAY GLUCOSE BLOOD QUANT: CPT

## 2025-02-23 RX ORDER — OXYCODONE HYDROCHLORIDE 10 MG/1
10 TABLET ORAL EVERY 4 HOURS PRN
Status: DISCONTINUED | OUTPATIENT
Start: 2025-02-23 | End: 2025-02-25 | Stop reason: HOSPADM

## 2025-02-23 RX ORDER — ACETAMINOPHEN 325 MG/1
650 TABLET ORAL EVERY 6 HOURS
Status: DISCONTINUED | OUTPATIENT
Start: 2025-02-23 | End: 2025-02-25 | Stop reason: HOSPADM

## 2025-02-23 RX ORDER — OXYCODONE HYDROCHLORIDE 5 MG/1
5 TABLET ORAL EVERY 4 HOURS PRN
Status: DISCONTINUED | OUTPATIENT
Start: 2025-02-23 | End: 2025-02-25 | Stop reason: HOSPADM

## 2025-02-23 RX ORDER — MORPHINE SULFATE 4 MG/ML
4 INJECTION INTRAVENOUS EVERY 2 HOUR PRN
Status: DISCONTINUED | OUTPATIENT
Start: 2025-02-23 | End: 2025-02-23

## 2025-02-23 RX ORDER — LIDOCAINE 560 MG/1
1 PATCH PERCUTANEOUS; TOPICAL; TRANSDERMAL DAILY
Status: DISCONTINUED | OUTPATIENT
Start: 2025-02-23 | End: 2025-02-25 | Stop reason: HOSPADM

## 2025-02-23 RX ADMIN — PANTOPRAZOLE SODIUM 40 MG: 40 INJECTION, POWDER, FOR SOLUTION INTRAVENOUS at 06:25

## 2025-02-23 RX ADMIN — SODIUM CHLORIDE, POTASSIUM CHLORIDE, SODIUM LACTATE AND CALCIUM CHLORIDE 100 ML/HR: 600; 310; 30; 20 INJECTION, SOLUTION INTRAVENOUS at 02:47

## 2025-02-23 RX ADMIN — METOPROLOL TARTRATE 25 MG: 25 TABLET, FILM COATED ORAL at 08:33

## 2025-02-23 RX ADMIN — PIPERACILLIN SODIUM AND TAZOBACTAM SODIUM 3.38 G: 3; .375 INJECTION, SOLUTION INTRAVENOUS at 20:24

## 2025-02-23 RX ADMIN — ACETAMINOPHEN 650 MG: 325 TABLET ORAL at 15:25

## 2025-02-23 RX ADMIN — PIPERACILLIN SODIUM AND TAZOBACTAM SODIUM 3.38 G: 3; .375 INJECTION, SOLUTION INTRAVENOUS at 07:36

## 2025-02-23 RX ADMIN — PIPERACILLIN SODIUM AND TAZOBACTAM SODIUM 3.38 G: 3; .375 INJECTION, SOLUTION INTRAVENOUS at 01:00

## 2025-02-23 RX ADMIN — ACETAMINOPHEN 650 MG: 325 TABLET ORAL at 09:26

## 2025-02-23 RX ADMIN — HEPARIN SODIUM 5000 UNITS: 5000 INJECTION, SOLUTION INTRAVENOUS; SUBCUTANEOUS at 07:36

## 2025-02-23 RX ADMIN — ACETAMINOPHEN 650 MG: 325 TABLET ORAL at 20:45

## 2025-02-23 RX ADMIN — MORPHINE SULFATE 4 MG: 4 INJECTION, SOLUTION INTRAMUSCULAR; INTRAVENOUS at 02:43

## 2025-02-23 RX ADMIN — HEPARIN SODIUM 5000 UNITS: 5000 INJECTION, SOLUTION INTRAVENOUS; SUBCUTANEOUS at 15:25

## 2025-02-23 RX ADMIN — POLYETHYLENE GLYCOL 3350 17 G: 17 POWDER, FOR SOLUTION ORAL at 08:33

## 2025-02-23 RX ADMIN — LIDOCAINE 1 PATCH: 560 PATCH PERCUTANEOUS; TOPICAL; TRANSDERMAL at 09:26

## 2025-02-23 RX ADMIN — MORPHINE SULFATE 4 MG: 4 INJECTION, SOLUTION INTRAMUSCULAR; INTRAVENOUS at 06:54

## 2025-02-23 RX ADMIN — OXYCODONE HYDROCHLORIDE 10 MG: 10 TABLET ORAL at 09:25

## 2025-02-23 RX ADMIN — SACUBITRIL AND VALSARTAN 1 TABLET: 24; 26 TABLET, FILM COATED ORAL at 08:33

## 2025-02-23 RX ADMIN — HEPARIN SODIUM 5000 UNITS: 5000 INJECTION, SOLUTION INTRAVENOUS; SUBCUTANEOUS at 00:59

## 2025-02-23 RX ADMIN — PIPERACILLIN SODIUM AND TAZOBACTAM SODIUM 3.38 G: 3; .375 INJECTION, SOLUTION INTRAVENOUS at 13:46

## 2025-02-23 RX ADMIN — ISOSORBIDE MONONITRATE 30 MG: 30 TABLET, EXTENDED RELEASE ORAL at 08:30

## 2025-02-23 RX ADMIN — Medication 4000 UNITS: at 08:30

## 2025-02-23 RX ADMIN — SACUBITRIL AND VALSARTAN 1 TABLET: 24; 26 TABLET, FILM COATED ORAL at 20:24

## 2025-02-23 RX ADMIN — ASPIRIN 81 MG: 81 TABLET, COATED ORAL at 08:30

## 2025-02-23 ASSESSMENT — PAIN - FUNCTIONAL ASSESSMENT
PAIN_FUNCTIONAL_ASSESSMENT: 0-10

## 2025-02-23 ASSESSMENT — COGNITIVE AND FUNCTIONAL STATUS - GENERAL
TURNING FROM BACK TO SIDE WHILE IN FLAT BAD: A LITTLE
STANDING UP FROM CHAIR USING ARMS: A LITTLE
MOBILITY SCORE: 17
DAILY ACTIVITIY SCORE: 22
DRESSING REGULAR LOWER BODY CLOTHING: A LITTLE
WALKING IN HOSPITAL ROOM: A LITTLE
CLIMB 3 TO 5 STEPS WITH RAILING: A LOT
MOVING TO AND FROM BED TO CHAIR: A LITTLE
HELP NEEDED FOR BATHING: A LITTLE
MOVING FROM LYING ON BACK TO SITTING ON SIDE OF FLAT BED WITH BEDRAILS: A LITTLE

## 2025-02-23 ASSESSMENT — ENCOUNTER SYMPTOMS
FEVER: 0
CHEST TIGHTNESS: 0
CHILLS: 0
DIFFICULTY URINATING: 0
NAUSEA: 0
ABDOMINAL PAIN: 1
SHORTNESS OF BREATH: 0

## 2025-02-23 ASSESSMENT — PAIN SCALES - GENERAL
PAINLEVEL_OUTOF10: 0 - NO PAIN
PAINLEVEL_OUTOF10: 3
PAINLEVEL_OUTOF10: 7
PAINLEVEL_OUTOF10: 4
PAINLEVEL_OUTOF10: 9
PAINLEVEL_OUTOF10: 3
PAINLEVEL_OUTOF10: 4
PAINLEVEL_OUTOF10: 8

## 2025-02-23 ASSESSMENT — PAIN DESCRIPTION - ORIENTATION
ORIENTATION: RIGHT
ORIENTATION: RIGHT

## 2025-02-23 ASSESSMENT — PAIN DESCRIPTION - DESCRIPTORS: DESCRIPTORS: ACHING;SHARP

## 2025-02-23 ASSESSMENT — PAIN DESCRIPTION - LOCATION
LOCATION: ABDOMEN

## 2025-02-23 NOTE — CARE PLAN
The patient's goals for the shift include  manage patient's pain    The clinical goals for the shift include Maintain patient's safety and manage patient's pain throughout the shift    Over the shift, the patient did make progress toward the following goals.

## 2025-02-23 NOTE — CARE PLAN
The patient's goals for the shift include      The clinical goals for the shift include Patient's abdominal pain will be maintained at or below a 4/10 on a 0-10 pain scale by end of shift, wich patient states is a tolerable level.    Over the shift, the patient denies pain at this time. States he has been comfortable this shift.

## 2025-02-23 NOTE — ASSESSMENT & PLAN NOTE
RUQ pain due to cholecystitis  Continue IV zosyn  Surgery to see, has consulted IR for cholecystotomy tube  NPO x meds  2/19: IR unable to place due to due to anatomy. GS will watch overnight, if worsens may need to go to OR. Continue antibiotics. Keep NPO.  2/20: Some improvement in WBC and Cr. Doubt will need emergent surgery at this point. Will transfer to med-surg. Continue iv antibiotics for now. Reassess in AM.  2/21: Currently on Zosyn and slow improvement.  Still has right upper quadrant tenderness.  Will need to follow-up with general surgery as outpatient.  Consider switching to Unasyn.  2/22: Surgery recommendations appreciated.  Drain placed.  Start on clear liquid diet

## 2025-02-23 NOTE — ASSESSMENT & PLAN NOTE
RUQ pain due to cholecystitis  Continue IV zosyn  Surgery to see, has consulted IR for cholecystotomy tube  NPO x meds  2/19: IR unable to place due to due to anatomy. GS will watch overnight, if worsens may need to go to OR. Continue antibiotics. Keep NPO.  2/20: Some improvement in WBC and Cr. Doubt will need emergent surgery at this point. Will transfer to med-surg. Continue iv antibiotics for now. Reassess in AM.  2/21: Currently on Zosyn and slow improvement.  Still has right upper quadrant tenderness.  Will need to follow-up with general surgery as outpatient.  Consider switching to Unasyn.  2/22: Surgery recommendations appreciated.  Drain placed.  Start on clear liquid diet  2/23: Surgery recommendation much appreciated.  Continue antibiotics.  Status post partial cholecystectomy and drain placement postoperative day 1.  Continue pain control.  Continue IV antibiotics.

## 2025-02-23 NOTE — PROGRESS NOTES
Román Marinelli is a 69 y.o. male on day 5 of admission presenting with Cholecystitis.       Subjective   Román Marinelli is a 69 y.o. male with PMHx s/f cocaine use, coronary artery disease, cardiomyopathy, diabetes type 2, hyperlipidemia presenting with abdominal pain fatigue malaise.  Patient came to hospital Red eye right upper quadrant abdominal pain ongoing for about 3 days.  The pain is becoming progressively worse could not take it anymore.  Patient has also had some vomiting associated with the pain.  He is denying any fevers chills or rigors but has not been eating or drinking very well.  He denies any chest pain cough any sputum production.  Has not had any diarrhea or urinary symptoms.  Emergency department workup demonstrated probable acute cholecystitis General Surgery was consulted patient started on IV Zosyn given IV fluids and hydration.  Case was discussed with the ED provider patient will be admitted length of stay likely to exceed 2 midnights.  Patient will need continued IV antibiotics interventional procedure with radiology to perform cholecystotomy tube and allow time for antibiotics to treat the underlying infection.     ED Course (Summary):   Vitals on presentation: Temperature 99 heart rate 105 respiratory rate 18 blood pressure 166/112 SpO2 95% room air  Labs: Glucose 169 creatinine 1.43 total bilirubin 1.8 initial troponin 60 9 repeat troponin 67 CBC significant for leukocytosis white blood cell count 21.8 platelets 293  Imaging: CT of abdomen and pelvis showing distended gallbladder demonstrating wall thickening pericholecystic fluid nonspecific groundglass opacities and atelectasis and remaining portion of the lung bases  Interventions: Patient given IV fluids started on IV Zosyn ED provider consulted with general surgery will plan for cholecystotomy tube and admission    2/19: Pt seen. Had received pain medication and is groggy. Does not withdrawal from abdominal exam, no peritoneal  signs. IR unable to place perc sugey drain due to bowel loop in the way. GS aware. Will watch pt overnight on iv antibiotics. If worsens may need to accept risk and go to OR. Continue iv antibiotics. Will recheck in AM.    2/20: Pt seen. More awake today. Discussed with GS. Seems to be responding to antibiotics. Started clears. Will transfer to med-surg. Tolerating clears but still has some abdominal pain with po intake. Current plan is to treat overnight and consider eventual release home on oral antibiotics. Will plan sugey as outpatient after completing antibiotics. Reassess in AM.    2/21: Patient seen.  Still has some right upper quadrant pain but appears to be tolerating clears.  Advance diet per general surgery.  Currently on Zosyn.  Consider switching to Unasyn anticipating switch to Augmentin on discharge.  [facility] to SNF[facility]  Await further input.     2/22: No acute events overnight.  Patient was in the OR most of the day.  I did get a message from the surgery team that the patient had gangrenous gallbladder and was hence had a drain placed.  Will continue IV antibiotics.  Monitor for another 24 to 48 hours.  2/23: No acute events overnight.  Patient's pain is much improved with the current pain regimen.  Drain is in place.  Surgery recommendations much appreciated.  Continue IV antibiotics.  Encourage out of bed.  Bowel regimen.    Objective     Last Recorded Vitals  BP 90/56 (BP Location: Right arm, Patient Position: Lying)   Pulse 66   Temp 36.2 °C (97.1 °F) (Temporal)   Resp 18   Wt 108 kg (239 lb)   SpO2 92%   Intake/Output last 3 Shifts:    Intake/Output Summary (Last 24 hours) at 2/23/2025 1829  Last data filed at 2/23/2025 1505  Gross per 24 hour   Intake 2656.78 ml   Output 70 ml   Net 2586.78 ml       Admission Weight  Weight: 112 kg (247 lb) (02/18/25 0811)    Daily Weight  02/22/25 : 108 kg (239 lb)      CONSTITUTIONAL - alert, in no acute distress,  obese  EYES - pupils are equal and reactive to light  CHEST - clear to auscultation, no wheezing, no crackles and no rales, good effort  CARDIAC - regular rate and regular rhythm, no murmur  ABDOMEN -large abdomen, overall soft, drain in place  EXTREMITIES - no edema, no deformities  NEUROLOGICAL - alert, oriented x3 and no acute focal signs  PSYCHIATRIC - alert, pleasant and cordial, age-appropriate       Lab Results  Results for orders placed or performed during the hospital encounter of 02/18/25 (from the past 24 hours)   POCT GLUCOSE   Result Value Ref Range    POCT Glucose 127 (H) 74 - 99 mg/dL   Comprehensive Metabolic Panel   Result Value Ref Range    Glucose 109 (H) 74 - 99 mg/dL    Sodium 137 136 - 145 mmol/L    Potassium 4.2 3.5 - 5.3 mmol/L    Chloride 104 98 - 107 mmol/L    Bicarbonate 28 21 - 32 mmol/L    Anion Gap 9 (L) 10 - 20 mmol/L    Urea Nitrogen 16 6 - 23 mg/dL    Creatinine 1.64 (H) 0.50 - 1.30 mg/dL    eGFR 46 (L) >60 mL/min/1.73m*2    Calcium 7.5 (L) 8.6 - 10.3 mg/dL    Albumin 2.6 (L) 3.4 - 5.0 g/dL    Alkaline Phosphatase 105 33 - 136 U/L    Total Protein 5.6 (L) 6.4 - 8.2 g/dL    AST 26 9 - 39 U/L    Bilirubin, Total 0.7 0.0 - 1.2 mg/dL    ALT 29 10 - 52 U/L   CBC and Auto Differential   Result Value Ref Range    WBC 12.4 (H) 4.4 - 11.3 x10*3/uL    nRBC 0.0 0.0 - 0.0 /100 WBCs    RBC 3.72 (L) 4.50 - 5.90 x10*6/uL    Hemoglobin 10.5 (L) 13.5 - 17.5 g/dL    Hematocrit 32.5 (L) 41.0 - 52.0 %    MCV 87 80 - 100 fL    MCH 28.2 26.0 - 34.0 pg    MCHC 32.3 32.0 - 36.0 g/dL    RDW 15.7 (H) 11.5 - 14.5 %    Platelets 290 150 - 450 x10*3/uL    Neutrophils % 67.9 40.0 - 80.0 %    Immature Granulocytes %, Automated 0.9 0.0 - 0.9 %    Lymphocytes % 18.3 13.0 - 44.0 %    Monocytes % 11.0 2.0 - 10.0 %    Eosinophils % 1.6 0.0 - 6.0 %    Basophils % 0.3 0.0 - 2.0 %    Neutrophils Absolute 8.44 (H) 1.20 - 7.70 x10*3/uL    Immature Granulocytes Absolute, Automated 0.11 0.00 - 0.70 x10*3/uL    Lymphocytes  Absolute 2.28 1.20 - 4.80 x10*3/uL    Monocytes Absolute 1.37 (H) 0.10 - 1.00 x10*3/uL    Eosinophils Absolute 0.20 0.00 - 0.70 x10*3/uL    Basophils Absolute 0.04 0.00 - 0.10 x10*3/uL   POCT GLUCOSE   Result Value Ref Range    POCT Glucose 112 (H) 74 - 99 mg/dL   POCT GLUCOSE   Result Value Ref Range    POCT Glucose 135 (H) 74 - 99 mg/dL   POCT GLUCOSE   Result Value Ref Range    POCT Glucose 131 (H) 74 - 99 mg/dL        Image Results  ECG 12 lead  Sinus tachycardia  Paired ventricular premature complexes  Left atrial enlargement  Left ventricular hypertrophy  Inferior infarct, age indeterminate  Baseline wander in lead(s) V5    Confirmed by Jewel Wilson ([id number]) on 2/19/2025 3:54:02 PM  IR biliary cholecystostomy  Narrative: Interpreted By:  Rubio Ch,   STUDY:  IR BILIARY CHOLECYSTOSTOMY;  2/19/2025 9:10 am      INDICATION:  Signs/Symptoms:perc cholecystostomy tube placement.          COMPARISON:  None.      ACCESSION NUMBER(S):  [id number]      ORDERING CLINICIAN:  RYAN ENGEL      TECHNIQUE:      CONSENT:  The patient/patient's POA/next of kin was informed of the nature of  the proposed procedure. The purposes, alternatives, risks, and  benefits were explained and discussed. All questions were answered  and consent was obtained.      RADIATION EXPOSURE:  None      SEDATION:  None      MEDICATION/CONTRAST:  No additional      TIME OUT:  A time out was performed immediately prior to procedure start with  the interventional team, correctly identifying the patient name, date  of birth, MRN, procedure, anatomy (including marking of site and  side), patient position, procedure consent form, relevant laboratory  and imaging test results, antibiotic administration, safety  precautions, and procedure-specific equipment needs.      COMPLICATIONS:  No immediate adverse events identified.      FINDINGS:  Patient was brought down for cholecystostomy tube placement as  clinically and radiographically cholecystitis  is favored. Due to  duration of symptoms and clinical comorbidities Surgical Services  requested percutaneous cholecystostomy. Review of the patient's CT  demonstrates very difficult anatomy with on shelving of the  gallbladder which is directed superiorly and terminates at the liver  dome. In addition to this the colon is completely transposed over the  anterior margin of the gallbladder. The patient was placed in the  left posterior oblique position and ultrasound evaluation was  performed in order to identify the gallbladder. The gallbladder was  identified, however, the level of which was at the nipple well above  the pleural space boundary. The lung could be seen in the needed  trajectory for cholecystostomy tube placement. Allowing for these  factors, the procedure was terminated.      Impression: Ultrasound evaluation for percutaneous cholecystostomy tube placement  demonstrates unfavorable anatomy and a safe approach could not be  identified. No intervention performed.          Performed and dictated at [facility].      MACRO:  None      Signed by: Rubio Ch 2/19/2025 9:32 AM  Dictation workstation:   [id number]       Assessment/Plan     Assessment & Plan  Cholecystitis  RUQ pain due to cholecystitis  Continue IV zosyn  Surgery to see, has consulted IR for cholecystotomy tube  NPO x meds  2/19: IR unable to place due to due to anatomy. GS will watch overnight, if worsens may need to go to OR. Continue antibiotics. Keep NPO.  2/20: Some improvement in WBC and Cr. Doubt will need emergent surgery at this point. Will transfer to med-surg. Continue iv antibiotics for now. Reassess in AM.  2/21: Currently on Zosyn and slow improvement.  Still has right upper quadrant tenderness.  Will need to follow-up with general surgery as outpatient.  Consider switching to Unasyn.  2/22: Surgery recommendations appreciated.  Drain placed.  Start on clear liquid diet  2/23: Surgery  recommendation much appreciated.  Continue antibiotics.  Status post partial cholecystectomy and drain placement postoperative day 1.  Continue pain control.  Continue IV antibiotics.  Diabetes mellitus (Multi)  By records only. Does not appear to be on oral medication.  Last recorded A1c was 6.4 in 2025  Not currently on sliding scale.  2/19: Will place on SSI, may have less glycemic control while ill.  2/21: Glycemic control is pretty good with SSI.  Hyperlipidemia  Continue statin.  Hypertension  Continue home meds, adjust as needed.  CAD (coronary artery disease), native coronary artery  CAD w elevated troponin  Check UDS given hx cocaine use  Pt not taking meds, will consult cardiology  Continue beta blocker if UDS negative for cocaine  2/19: Awaiting cards consult.  2/20: Revised cardiac risk index appears to be about 4 (For intraperitoneal surgery, hx CHF, MI, occasional insulin), risk of major cardiac event is 15%. Does not appear to be in active CHF or coronary ischemia. Appreciate cards input.  2/21: Asymptomatic.  Dehydration  Dehydration due to acute illness  IV hydration, monitor for overload, last ef 35-40%  2/20: Appears euvolemic at present.  Chronic systolic heart failure  Last EF found was 35-40% in 2023.  Appears to be on Entresto. Will continue for now.  2/20: Appears euvolemic at present. Recheck in AM.  2/21: Appears to be tolerating Entresto.        Keyur Magallon MD

## 2025-02-23 NOTE — PROGRESS NOTES
Román Marinelli is a 69 y.o. male on day 4 of admission presenting with Cholecystitis.       Subjective   Román Marinelli is a 69 y.o. male with PMHx s/f cocaine use, coronary artery disease, cardiomyopathy, diabetes type 2, hyperlipidemia presenting with abdominal pain fatigue malaise.  Patient came to hospital Red eye right upper quadrant abdominal pain ongoing for about 3 days.  The pain is becoming progressively worse could not take it anymore.  Patient has also had some vomiting associated with the pain.  He is denying any fevers chills or rigors but has not been eating or drinking very well.  He denies any chest pain cough any sputum production.  Has not had any diarrhea or urinary symptoms.  Emergency department workup demonstrated probable acute cholecystitis General Surgery was consulted patient started on IV Zosyn given IV fluids and hydration.  Case was discussed with the ED provider patient will be admitted length of stay likely to exceed 2 midnights.  Patient will need continued IV antibiotics interventional procedure with radiology to perform cholecystotomy tube and allow time for antibiotics to treat the underlying infection.     ED Course (Summary):   Vitals on presentation: Temperature 99 heart rate 105 respiratory rate 18 blood pressure 166/112 SpO2 95% room air  Labs: Glucose 169 creatinine 1.43 total bilirubin 1.8 initial troponin 60 9 repeat troponin 67 CBC significant for leukocytosis white blood cell count 21.8 platelets 293  Imaging: CT of abdomen and pelvis showing distended gallbladder demonstrating wall thickening pericholecystic fluid nonspecific groundglass opacities and atelectasis and remaining portion of the lung bases  Interventions: Patient given IV fluids started on IV Zosyn ED provider consulted with general surgery will plan for cholecystotomy tube and admission    2/19: Pt seen. Had received pain medication and is groggy. Does not withdrawal from abdominal exam, no peritoneal  signs. IR unable to place perc sugey drain due to bowel loop in the way. GS aware. Will watch pt overnight on iv antibiotics. If worsens may need to accept risk and go to OR. Continue iv antibiotics. Will recheck in AM.    2/20: Pt seen. More awake today. Discussed with GS. Seems to be responding to antibiotics. Started clears. Will transfer to med-surg. Tolerating clears but still has some abdominal pain with po intake. Current plan is to treat overnight and consider eventual release home on oral antibiotics. Will plan sugey as outpatient after completing antibiotics. Reassess in AM.    2/21: Patient seen.  Still has some right upper quadrant pain but appears to be tolerating clears.  Advance diet per general surgery.  Currently on Zosyn.  Consider switching to Unasyn anticipating switch to Augmentin on discharge.  [facility] to SNF[facility]  Await further input.     2/22: No acute events overnight.  Patient was in the OR most of the day.  I did get a message from the surgery team that the patient had gangrenous gallbladder and was hence had a drain placed.  Will continue IV antibiotics.  Monitor for another 24 to 48 hours.    Objective     Last Recorded Vitals  /67 (BP Location: Right arm, Patient Position: Lying)   Pulse 72   Temp 35.9 °C (96.6 °F) (Temporal)   Resp 18   Wt 108 kg (239 lb)   SpO2 96%   Intake/Output last 3 Shifts:    Intake/Output Summary (Last 24 hours) at 2/22/2025 2026  Last data filed at 2/22/2025 1723  Gross per 24 hour   Intake 2247.5 ml   Output 1570 ml   Net 677.5 ml       Admission Weight  Weight: 112 kg (247 lb) (02/18/25 0811)    Daily Weight  02/22/25 : 108 kg (239 lb)      Physical Exam  Constitutional:       Appearance: He is ill-appearing.   HENT:      Head: Normocephalic and atraumatic.      Nose: Nose normal. No congestion or rhinorrhea.      Mouth/Throat:      Mouth: Mucous membranes are dry.      Pharynx: Oropharynx is clear.   Eyes:       General: No scleral icterus.     Extraocular Movements: Extraocular movements intact.      Pupils: Pupils are equal, round, and reactive to light.   Cardiovascular:      Rate and Rhythm: Normal rate and regular rhythm.      Heart sounds: Normal heart sounds. No murmur heard.     No friction rub. No gallop.   Pulmonary:      Effort: Pulmonary effort is normal.      Breath sounds: Normal breath sounds. No wheezing, rhonchi or rales.   Chest:      Chest wall: No tenderness.   Abdominal:      General: There is no distension.      Palpations: Abdomen is soft.      Tenderness: There is abdominal tenderness. There is no guarding or rebound.   Musculoskeletal:         General: No swelling, tenderness or signs of injury. Normal range of motion.      Cervical back: Normal range of motion.   Skin:     General: Skin is warm and dry.      Coloration: Skin is not jaundiced.      Findings: No bruising, erythema or rash.   Neurological:      General: No focal deficit present.      Mental Status: He is alert and oriented to person, place, and time.          Lab Results  Results for orders placed or performed during the hospital encounter of 02/18/25 (from the past 24 hours)   POCT GLUCOSE   Result Value Ref Range    POCT Glucose 171 (H) 74 - 99 mg/dL   Comprehensive Metabolic Panel   Result Value Ref Range    Glucose 126 (H) 74 - 99 mg/dL    Sodium 134 (L) 136 - 145 mmol/L    Potassium 3.5 3.5 - 5.3 mmol/L    Chloride 104 98 - 107 mmol/L    Bicarbonate 24 21 - 32 mmol/L    Anion Gap 10 10 - 20 mmol/L    Urea Nitrogen 10 6 - 23 mg/dL    Creatinine 1.06 0.50 - 1.30 mg/dL    eGFR 77 >60 mL/min/1.73m*2    Calcium 8.1 (L) 8.6 - 10.3 mg/dL    Albumin 3.0 (L) 3.4 - 5.0 g/dL    Alkaline Phosphatase 118 33 - 136 U/L    Total Protein 6.1 (L) 6.4 - 8.2 g/dL    AST 28 9 - 39 U/L    Bilirubin, Total 1.3 (H) 0.0 - 1.2 mg/dL    ALT 33 10 - 52 U/L   CBC   Result Value Ref Range    WBC 16.1 (H) 4.4 - 11.3 x10*3/uL    nRBC 0.0 0.0 - 0.0 /100  WBCs    RBC 4.29 (L) 4.50 - 5.90 x10*6/uL    Hemoglobin 11.9 (L) 13.5 - 17.5 g/dL    Hematocrit 36.3 (L) 41.0 - 52.0 %    MCV 85 80 - 100 fL    MCH 27.7 26.0 - 34.0 pg    MCHC 32.8 32.0 - 36.0 g/dL    RDW 14.9 (H) 11.5 - 14.5 %    Platelets 281 150 - 450 x10*3/uL   POCT GLUCOSE   Result Value Ref Range    POCT Glucose 151 (H) 74 - 99 mg/dL   POCT GLUCOSE   Result Value Ref Range    POCT Glucose 136 (H) 74 - 99 mg/dL        Image Results  ECG 12 lead  Sinus tachycardia  Paired ventricular premature complexes  Left atrial enlargement  Left ventricular hypertrophy  Inferior infarct, age indeterminate  Baseline wander in lead(s) V5    Confirmed by Jewel Wilson ([id number]) on 2/19/2025 3:54:02 PM  IR biliary cholecystostomy  Narrative: Interpreted By:  Rubio Ch,   STUDY:  IR BILIARY CHOLECYSTOSTOMY;  2/19/2025 9:10 am      INDICATION:  Signs/Symptoms:perc cholecystostomy tube placement.          COMPARISON:  None.      ACCESSION NUMBER(S):  [id number]      ORDERING CLINICIAN:  RYAN ENGEL      TECHNIQUE:      CONSENT:  The patient/patient's POA/next of kin was informed of the nature of  the proposed procedure. The purposes, alternatives, risks, and  benefits were explained and discussed. All questions were answered  and consent was obtained.      RADIATION EXPOSURE:  None      SEDATION:  None      MEDICATION/CONTRAST:  No additional      TIME OUT:  A time out was performed immediately prior to procedure start with  the interventional team, correctly identifying the patient name, date  of birth, MRN, procedure, anatomy (including marking of site and  side), patient position, procedure consent form, relevant laboratory  and imaging test results, antibiotic administration, safety  precautions, and procedure-specific equipment needs.      COMPLICATIONS:  No immediate adverse events identified.      FINDINGS:  Patient was brought down for cholecystostomy tube placement as  clinically and radiographically cholecystitis  is favored. Due to  duration of symptoms and clinical comorbidities Surgical Services  requested percutaneous cholecystostomy. Review of the patient's CT  demonstrates very difficult anatomy with on shelving of the  gallbladder which is directed superiorly and terminates at the liver  dome. In addition to this the colon is completely transposed over the  anterior margin of the gallbladder. The patient was placed in the  left posterior oblique position and ultrasound evaluation was  performed in order to identify the gallbladder. The gallbladder was  identified, however, the level of which was at the nipple well above  the pleural space boundary. The lung could be seen in the needed  trajectory for cholecystostomy tube placement. Allowing for these  factors, the procedure was terminated.      Impression: Ultrasound evaluation for percutaneous cholecystostomy tube placement  demonstrates unfavorable anatomy and a safe approach could not be  identified. No intervention performed.          Performed and dictated at [facility].      MACRO:  None      Signed by: Rubio Ch 2/19/2025 9:32 AM  Dictation workstation:   [id number]       Assessment/Plan     Assessment & Plan  Cholecystitis  RUQ pain due to cholecystitis  Continue IV zosyn  Surgery to see, has consulted IR for cholecystotomy tube  NPO x meds  2/19: IR unable to place due to due to anatomy. GS will watch overnight, if worsens may need to go to OR. Continue antibiotics. Keep NPO.  2/20: Some improvement in WBC and Cr. Doubt will need emergent surgery at this point. Will transfer to med-surg. Continue iv antibiotics for now. Reassess in AM.  2/21: Currently on Zosyn and slow improvement.  Still has right upper quadrant tenderness.  Will need to follow-up with general surgery as outpatient.  Consider switching to Unasyn.  2/22: Surgery recommendations appreciated.  Drain placed.  Start on clear liquid diet  Diabetes mellitus  (Multi)  By records only. Does not appear to be on oral medication.  Last recorded A1c was 6.4 in 2025  Not currently on sliding scale.  2/19: Will place on SSI, may have less glycemic control while ill.  2/21: Glycemic control is pretty good with SSI.  Hyperlipidemia  Continue statin.  Hypertension  Continue home meds, adjust as needed.  CAD (coronary artery disease), native coronary artery  CAD w elevated troponin  Check UDS given hx cocaine use  Pt not taking meds, will consult cardiology  Continue beta blocker if UDS negative for cocaine  2/19: Awaiting cards consult.  2/20: Revised cardiac risk index appears to be about 4 (For intraperitoneal surgery, hx CHF, MI, occasional insulin), risk of major cardiac event is 15%. Does not appear to be in active CHF or coronary ischemia. Appreciate cards input.  2/21: Asymptomatic.  Dehydration  Dehydration due to acute illness  IV hydration, monitor for overload, last ef 35-40%  2/20: Appears euvolemic at present.  Chronic systolic heart failure  Last EF found was 35-40% in 2023.  Appears to be on Entresto. Will continue for now.  2/20: Appears euvolemic at present. Recheck in AM.  2/21: Appears to be tolerating Entresto.        Keyur Magallon MD

## 2025-02-23 NOTE — PROGRESS NOTES
"GENERAL SURGERY PROGRESS NOTE    Román Marinelli   1955   [id number]     Román Marinelli is a 69 y.o. male on day 5 of admission presenting with Cholecystitis.    Status post laparoscopic partial cholecystectomy, drain placement on 2/22/2025.    Subjective  No acute events overnight.  Patient reports feeling sore after surgery.  Tolerated some liquids without nausea or vomiting.  Has not passed gas.    Review of Systems:  Review of Systems   Constitutional:  Negative for chills and fever.   Respiratory:  Negative for chest tightness and shortness of breath.    Cardiovascular:  Negative for chest pain.   Gastrointestinal:  Positive for abdominal pain. Negative for nausea.   Genitourinary:  Negative for difficulty urinating.   Skin:  Negative for rash.       Objective    Last Recorded Vitals  Blood pressure 114/72, pulse 74, temperature 36.7 °C (98 °F), temperature source Temporal, resp. rate 18, height 1.753 m (5' 9\"), weight 108 kg (239 lb), SpO2 98%.    Intake/Output last 3 Shifts:  I/O last 3 completed shifts:  In: 3142.5 (29 mL/kg) [P.O.:1130; I.V.:1762.5 (16.3 mL/kg); IV Piggyback:250]  Out: 1600 (14.8 mL/kg) [Urine:1500 (0.4 mL/kg/hr); Emesis/NG output:30; Drains:50; Blood:20]  Weight: 108.4 kg     Intake/Output Summary (Last 24 hours) at 2/23/2025 0732  Last data filed at 2/23/2025 0700  Gross per 24 hour   Intake 2392.5 ml   Output 770 ml   Net 1622.5 ml       Physical Exam  Constitutional:       General: He is not in acute distress.  HENT:      Head: Normocephalic.   Eyes:      General: No scleral icterus.  Cardiovascular:      Rate and Rhythm: Normal rate and regular rhythm.      Pulses: Normal pulses.   Pulmonary:      Effort: Pulmonary effort is normal. No respiratory distress.   Abdominal:      Palpations: Abdomen is soft.      Comments: Mildly distended.  Appropriately tender near incisions and near drain.  Drain in place with dark bilious output.  Incisions well-approximated with skin glue in place. "   Musculoskeletal:         General: No swelling.      Cervical back: Neck supple.   Skin:     General: Skin is warm and dry.   Neurological:      Mental Status: He is alert. Mental status is at baseline.         Relevant Results  Labs:  Results for orders placed or performed during the hospital encounter of 02/18/25 (from the past 24 hours)   POCT GLUCOSE   Result Value Ref Range    POCT Glucose 136 (H) 74 - 99 mg/dL   Sterile Fluid Culture/Smear    Specimen: Aspirate; Fluid   Result Value Ref Range    Gram Stain No polymorphonuclear leukocytes seen     Gram Stain No organisms seen    POCT GLUCOSE   Result Value Ref Range    POCT Glucose 127 (H) 74 - 99 mg/dL   CBC and Auto Differential   Result Value Ref Range    WBC 12.4 (H) 4.4 - 11.3 x10*3/uL    nRBC 0.0 0.0 - 0.0 /100 WBCs    RBC 3.72 (L) 4.50 - 5.90 x10*6/uL    Hemoglobin 10.5 (L) 13.5 - 17.5 g/dL    Hematocrit 32.5 (L) 41.0 - 52.0 %    MCV 87 80 - 100 fL    MCH 28.2 26.0 - 34.0 pg    MCHC 32.3 32.0 - 36.0 g/dL    RDW 15.7 (H) 11.5 - 14.5 %    Platelets 290 150 - 450 x10*3/uL    Neutrophils % 67.9 40.0 - 80.0 %    Immature Granulocytes %, Automated 0.9 0.0 - 0.9 %    Lymphocytes % 18.3 13.0 - 44.0 %    Monocytes % 11.0 2.0 - 10.0 %    Eosinophils % 1.6 0.0 - 6.0 %    Basophils % 0.3 0.0 - 2.0 %    Neutrophils Absolute 8.44 (H) 1.20 - 7.70 x10*3/uL    Immature Granulocytes Absolute, Automated 0.11 0.00 - 0.70 x10*3/uL    Lymphocytes Absolute 2.28 1.20 - 4.80 x10*3/uL    Monocytes Absolute 1.37 (H) 0.10 - 1.00 x10*3/uL    Eosinophils Absolute 0.20 0.00 - 0.70 x10*3/uL    Basophils Absolute 0.04 0.00 - 0.10 x10*3/uL   POCT GLUCOSE   Result Value Ref Range    POCT Glucose 112 (H) 74 - 99 mg/dL       Images:  IR biliary cholecystostomy   Final Result   Ultrasound evaluation for percutaneous cholecystostomy tube placement   demonstrates unfavorable anatomy and a safe approach could not be   identified. No intervention performed.             Performed and dictated at  [facility].        MACRO:   None        Signed by: Rubio Ch 2/19/2025 9:32 AM   Dictation workstation:   [id number]      CT abdomen pelvis w IV contrast   Final Result   Distended gallbladder demonstrating wall thickening, pericholecystic   fluid, and surrounding inflammation, suggesting cholecystitis. If   further evaluation is warranted, HIDA scan or ultrasound can be   offered. Partial collapse/consolidation of the right lower lobe.   Nonspecific ground-glass opacities as well as areas of   atelectasis/scarring in the remaining visible portions of the lung   bases. Colonic diverticulosis.   Hepatic steatosis.        MACRO:   None.        Signed by: Jarrod Strange 2/18/2025 10:27 AM   Dictation workstation:   [id number]          Assessment and Plan  Assessment & Plan  Cholecystitis    Diabetes mellitus (Multi)    Hyperlipidemia    Hypertension    CAD (coronary artery disease), native coronary artery    Dehydration    Chronic systolic heart failure    69 y.o. male with history of stroke 1 year ago, hypertension, and intermittent cocaine use (last use last week), presented with 3 days of right upper quadrant abdominal pain with CT imaging demonstrating signs concerning for acute cholecystitis.  Patient was unable to undergo IR placement of PERC cholecystostomy tube due to anatomical barriers.  Attempted conservative management however patient continued to have pain and had a worsening leukocytosis.  Patient was brought to the OR yesterday for diagnostic laparoscopy, partial cholecystectomy, drain placement.  Doing well postoperatively.    Plan:  - Advance to low-fat diet  - Continue IV antibiotics: Zosyn  - Pain control and antinausea medication  - Maintain drain, monitor output  - PT/OT    Discussed with attending Dr. Helio Payne,  - PGY4  General Surgery

## 2025-02-24 ENCOUNTER — DOCUMENTATION (OUTPATIENT)
Dept: HOME HEALTH SERVICES | Facility: HOME HEALTH | Age: 70
End: 2025-02-24
Payer: OTHER GOVERNMENT

## 2025-02-24 PROBLEM — N17.9 AKI (ACUTE KIDNEY INJURY) (CMS-HCC): Status: ACTIVE | Noted: 2025-02-24

## 2025-02-24 LAB
ALBUMIN SERPL BCP-MCNC: 2.8 G/DL (ref 3.4–5)
ALP SERPL-CCNC: 104 U/L (ref 33–136)
ALT SERPL W P-5'-P-CCNC: 25 U/L (ref 10–52)
ANION GAP SERPL CALC-SCNC: 12 MMOL/L (ref 10–20)
AST SERPL W P-5'-P-CCNC: 17 U/L (ref 9–39)
ATRIAL RATE: 87 BPM
BASOPHILS # BLD AUTO: 0.07 X10*3/UL (ref 0–0.1)
BASOPHILS NFR BLD AUTO: 0.7 %
BILIRUB SERPL-MCNC: 0.5 MG/DL (ref 0–1.2)
BUN SERPL-MCNC: 24 MG/DL (ref 6–23)
CALCIUM SERPL-MCNC: 7.5 MG/DL (ref 8.6–10.3)
CHLORIDE SERPL-SCNC: 104 MMOL/L (ref 98–107)
CO2 SERPL-SCNC: 24 MMOL/L (ref 21–32)
CREAT SERPL-MCNC: 1.85 MG/DL (ref 0.5–1.3)
EGFRCR SERPLBLD CKD-EPI 2021: 39 ML/MIN/1.73M*2
EOSINOPHIL # BLD AUTO: 0.32 X10*3/UL (ref 0–0.7)
EOSINOPHIL NFR BLD AUTO: 3.1 %
ERYTHROCYTE [DISTWIDTH] IN BLOOD BY AUTOMATED COUNT: 15.9 % (ref 11.5–14.5)
GLUCOSE BLD MANUAL STRIP-MCNC: 124 MG/DL (ref 74–99)
GLUCOSE BLD MANUAL STRIP-MCNC: 125 MG/DL (ref 74–99)
GLUCOSE BLD MANUAL STRIP-MCNC: 126 MG/DL (ref 74–99)
GLUCOSE BLD MANUAL STRIP-MCNC: 153 MG/DL (ref 74–99)
GLUCOSE SERPL-MCNC: 111 MG/DL (ref 74–99)
HCT VFR BLD AUTO: 35.4 % (ref 41–52)
HGB BLD-MCNC: 11.2 G/DL (ref 13.5–17.5)
IMM GRANULOCYTES # BLD AUTO: 0.13 X10*3/UL (ref 0–0.7)
IMM GRANULOCYTES NFR BLD AUTO: 1.3 % (ref 0–0.9)
LYMPHOCYTES # BLD AUTO: 2.53 X10*3/UL (ref 1.2–4.8)
LYMPHOCYTES NFR BLD AUTO: 24.3 %
MAGNESIUM SERPL-MCNC: 2.25 MG/DL (ref 1.6–2.4)
MCH RBC QN AUTO: 27.9 PG (ref 26–34)
MCHC RBC AUTO-ENTMCNC: 31.6 G/DL (ref 32–36)
MCV RBC AUTO: 88 FL (ref 80–100)
MONOCYTES # BLD AUTO: 0.93 X10*3/UL (ref 0.1–1)
MONOCYTES NFR BLD AUTO: 8.9 %
NEUTROPHILS # BLD AUTO: 6.42 X10*3/UL (ref 1.2–7.7)
NEUTROPHILS NFR BLD AUTO: 61.7 %
NRBC BLD-RTO: 0 /100 WBCS (ref 0–0)
P AXIS: 46 DEGREES
PLATELET # BLD AUTO: 317 X10*3/UL (ref 150–450)
POTASSIUM SERPL-SCNC: 3.7 MMOL/L (ref 3.5–5.3)
PR INTERVAL: 170 MS
PROT SERPL-MCNC: 5.9 G/DL (ref 6.4–8.2)
Q ONSET: 251 MS
QRS COUNT: 14 BEATS
QRS DURATION: 105 MS
QT INTERVAL: 372 MS
QTC CALCULATION(BAZETT): 448 MS
QTC FREDERICIA: 420 MS
R AXIS: -15 DEGREES
RBC # BLD AUTO: 4.01 X10*6/UL (ref 4.5–5.9)
SODIUM SERPL-SCNC: 136 MMOL/L (ref 136–145)
T AXIS: 189 DEGREES
T OFFSET: 437 MS
VENTRICULAR RATE: 87 BPM
WBC # BLD AUTO: 10.4 X10*3/UL (ref 4.4–11.3)

## 2025-02-24 PROCEDURE — 2500000004 HC RX 250 GENERAL PHARMACY W/ HCPCS (ALT 636 FOR OP/ED): Performed by: INTERNAL MEDICINE

## 2025-02-24 PROCEDURE — 83735 ASSAY OF MAGNESIUM: CPT | Performed by: SURGERY

## 2025-02-24 PROCEDURE — 85025 COMPLETE CBC W/AUTO DIFF WBC: CPT | Performed by: INTERNAL MEDICINE

## 2025-02-24 PROCEDURE — 2500000004 HC RX 250 GENERAL PHARMACY W/ HCPCS (ALT 636 FOR OP/ED): Performed by: SURGERY

## 2025-02-24 PROCEDURE — 2500000005 HC RX 250 GENERAL PHARMACY W/O HCPCS: Performed by: SURGERY

## 2025-02-24 PROCEDURE — 99233 SBSQ HOSP IP/OBS HIGH 50: CPT | Performed by: INTERNAL MEDICINE

## 2025-02-24 PROCEDURE — 2500000001 HC RX 250 WO HCPCS SELF ADMINISTERED DRUGS (ALT 637 FOR MEDICARE OP): Performed by: SURGERY

## 2025-02-24 PROCEDURE — 1100000001 HC PRIVATE ROOM DAILY

## 2025-02-24 PROCEDURE — 82947 ASSAY GLUCOSE BLOOD QUANT: CPT

## 2025-02-24 PROCEDURE — 2500000001 HC RX 250 WO HCPCS SELF ADMINISTERED DRUGS (ALT 637 FOR MEDICARE OP): Performed by: INTERNAL MEDICINE

## 2025-02-24 PROCEDURE — 36415 COLL VENOUS BLD VENIPUNCTURE: CPT | Performed by: INTERNAL MEDICINE

## 2025-02-24 PROCEDURE — 80053 COMPREHEN METABOLIC PANEL: CPT | Performed by: INTERNAL MEDICINE

## 2025-02-24 RX ORDER — SODIUM CHLORIDE, SODIUM LACTATE, POTASSIUM CHLORIDE, CALCIUM CHLORIDE 600; 310; 30; 20 MG/100ML; MG/100ML; MG/100ML; MG/100ML
125 INJECTION, SOLUTION INTRAVENOUS CONTINUOUS
Status: ACTIVE | OUTPATIENT
Start: 2025-02-24 | End: 2025-02-25

## 2025-02-24 RX ADMIN — OXYCODONE HYDROCHLORIDE 10 MG: 10 TABLET ORAL at 20:41

## 2025-02-24 RX ADMIN — ACETAMINOPHEN 650 MG: 325 TABLET ORAL at 03:23

## 2025-02-24 RX ADMIN — PANTOPRAZOLE SODIUM 40 MG: 40 TABLET, DELAYED RELEASE ORAL at 06:14

## 2025-02-24 RX ADMIN — Medication 4000 UNITS: at 09:08

## 2025-02-24 RX ADMIN — ISOSORBIDE MONONITRATE 30 MG: 30 TABLET, EXTENDED RELEASE ORAL at 09:08

## 2025-02-24 RX ADMIN — PIPERACILLIN SODIUM AND TAZOBACTAM SODIUM 3.38 G: 3; .375 INJECTION, SOLUTION INTRAVENOUS at 02:17

## 2025-02-24 RX ADMIN — HEPARIN SODIUM 5000 UNITS: 5000 INJECTION, SOLUTION INTRAVENOUS; SUBCUTANEOUS at 17:30

## 2025-02-24 RX ADMIN — ACETAMINOPHEN 650 MG: 325 TABLET ORAL at 20:07

## 2025-02-24 RX ADMIN — PIPERACILLIN SODIUM AND TAZOBACTAM SODIUM 3.38 G: 3; .375 INJECTION, SOLUTION INTRAVENOUS at 13:45

## 2025-02-24 RX ADMIN — LIDOCAINE 1 PATCH: 560 PATCH PERCUTANEOUS; TOPICAL; TRANSDERMAL at 09:09

## 2025-02-24 RX ADMIN — PIPERACILLIN SODIUM AND TAZOBACTAM SODIUM 3.38 G: 3; .375 INJECTION, SOLUTION INTRAVENOUS at 09:21

## 2025-02-24 RX ADMIN — SACUBITRIL AND VALSARTAN 1 TABLET: 24; 26 TABLET, FILM COATED ORAL at 09:08

## 2025-02-24 RX ADMIN — METOPROLOL TARTRATE 25 MG: 25 TABLET, FILM COATED ORAL at 20:06

## 2025-02-24 RX ADMIN — HEPARIN SODIUM 5000 UNITS: 5000 INJECTION, SOLUTION INTRAVENOUS; SUBCUTANEOUS at 00:54

## 2025-02-24 RX ADMIN — OXYCODONE HYDROCHLORIDE 10 MG: 10 TABLET ORAL at 09:08

## 2025-02-24 RX ADMIN — METOPROLOL TARTRATE 25 MG: 25 TABLET, FILM COATED ORAL at 09:08

## 2025-02-24 RX ADMIN — ACETAMINOPHEN 650 MG: 325 TABLET ORAL at 13:46

## 2025-02-24 RX ADMIN — ACETAMINOPHEN 650 MG: 325 TABLET ORAL at 09:08

## 2025-02-24 RX ADMIN — OXYCODONE HYDROCHLORIDE 10 MG: 10 TABLET ORAL at 02:16

## 2025-02-24 RX ADMIN — ASPIRIN 81 MG: 81 TABLET, COATED ORAL at 09:08

## 2025-02-24 RX ADMIN — SODIUM CHLORIDE, POTASSIUM CHLORIDE, SODIUM LACTATE AND CALCIUM CHLORIDE 125 ML/HR: 600; 310; 30; 20 INJECTION, SOLUTION INTRAVENOUS at 09:22

## 2025-02-24 RX ADMIN — HEPARIN SODIUM 5000 UNITS: 5000 INJECTION, SOLUTION INTRAVENOUS; SUBCUTANEOUS at 09:21

## 2025-02-24 RX ADMIN — PIPERACILLIN SODIUM AND TAZOBACTAM SODIUM 3.38 G: 3; .375 INJECTION, SOLUTION INTRAVENOUS at 20:05

## 2025-02-24 ASSESSMENT — COGNITIVE AND FUNCTIONAL STATUS - GENERAL
DRESSING REGULAR LOWER BODY CLOTHING: A LITTLE
HELP NEEDED FOR BATHING: A LITTLE
MOVING FROM LYING ON BACK TO SITTING ON SIDE OF FLAT BED WITH BEDRAILS: A LITTLE
DRESSING REGULAR LOWER BODY CLOTHING: A LITTLE
WALKING IN HOSPITAL ROOM: A LITTLE
MOBILITY SCORE: 17
DRESSING REGULAR LOWER BODY CLOTHING: A LITTLE
CLIMB 3 TO 5 STEPS WITH RAILING: A LOT
WALKING IN HOSPITAL ROOM: A LITTLE
DRESSING REGULAR LOWER BODY CLOTHING: A LITTLE
WALKING IN HOSPITAL ROOM: A LITTLE
CLIMB 3 TO 5 STEPS WITH RAILING: A LOT
MOBILITY SCORE: 17
TURNING FROM BACK TO SIDE WHILE IN FLAT BAD: A LITTLE
CLIMB 3 TO 5 STEPS WITH RAILING: A LOT
CLIMB 3 TO 5 STEPS WITH RAILING: A LOT
WALKING IN HOSPITAL ROOM: A LITTLE
HELP NEEDED FOR BATHING: A LITTLE
HELP NEEDED FOR BATHING: A LITTLE
TURNING FROM BACK TO SIDE WHILE IN FLAT BAD: A LITTLE
MOVING TO AND FROM BED TO CHAIR: A LITTLE
STANDING UP FROM CHAIR USING ARMS: A LITTLE
MOVING TO AND FROM BED TO CHAIR: A LITTLE
MOVING TO AND FROM BED TO CHAIR: A LITTLE
STANDING UP FROM CHAIR USING ARMS: A LITTLE
MOVING FROM LYING ON BACK TO SITTING ON SIDE OF FLAT BED WITH BEDRAILS: A LITTLE
TURNING FROM BACK TO SIDE WHILE IN FLAT BAD: A LITTLE
HELP NEEDED FOR BATHING: A LITTLE
STANDING UP FROM CHAIR USING ARMS: A LITTLE
MOVING FROM LYING ON BACK TO SITTING ON SIDE OF FLAT BED WITH BEDRAILS: A LITTLE
DAILY ACTIVITIY SCORE: 22
MOVING TO AND FROM BED TO CHAIR: A LITTLE
TURNING FROM BACK TO SIDE WHILE IN FLAT BAD: A LITTLE
STANDING UP FROM CHAIR USING ARMS: A LITTLE
MOVING FROM LYING ON BACK TO SITTING ON SIDE OF FLAT BED WITH BEDRAILS: A LITTLE
DAILY ACTIVITIY SCORE: 22

## 2025-02-24 ASSESSMENT — ENCOUNTER SYMPTOMS
DIFFICULTY URINATING: 0
NAUSEA: 0
CHILLS: 0
CHEST TIGHTNESS: 0
SHORTNESS OF BREATH: 0
FEVER: 0
ABDOMINAL PAIN: 0

## 2025-02-24 ASSESSMENT — PAIN DESCRIPTION - LOCATION
LOCATION: ABDOMEN

## 2025-02-24 ASSESSMENT — PAIN - FUNCTIONAL ASSESSMENT
PAIN_FUNCTIONAL_ASSESSMENT: 0-10

## 2025-02-24 ASSESSMENT — PAIN SCALES - GENERAL
PAINLEVEL_OUTOF10: 0 - NO PAIN
PAINLEVEL_OUTOF10: 0 - NO PAIN
PAINLEVEL_OUTOF10: 5 - MODERATE PAIN
PAINLEVEL_OUTOF10: 8
PAINLEVEL_OUTOF10: 3
PAINLEVEL_OUTOF10: 9
PAINLEVEL_OUTOF10: 7
PAINLEVEL_OUTOF10: 0 - NO PAIN
PAINLEVEL_OUTOF10: 0 - NO PAIN

## 2025-02-24 ASSESSMENT — PAIN DESCRIPTION - ORIENTATION
ORIENTATION: RIGHT
ORIENTATION: RIGHT

## 2025-02-24 NOTE — PROGRESS NOTES
Physical Therapy                 Therapy Communication Note    Patient Name: Román Marinelli  MRN: [id number]  Department: [facility] [place] [facility] [address]  Room: [address] [place] [address]  Today's Date: 2/24/2025     Discipline: Physical Therapy    PT Missed Visit: Yes     Missed Visit Reason: Patient refused    Missed Time: Attempt    Comment: pt adamantly declined therapy, despite attempted persuasion, due to fatigue. Pt stated he had been up and moving in room with nursing. checked with PCA pt. has not been out of bed today

## 2025-02-24 NOTE — PROGRESS NOTES
Occupational Therapy                 Therapy Communication Note    Patient Name: Román Marinelli  MRN: [id number]  Department: [facility] [address]  Room: [address] [place], [place] [address]  Today's Date: 2/24/2025     Discipline: Occupational Therapy    OT Missed Visit: Yes     Missed Visit Reason: Missed Visit Reason: Patient refused (adamantly declined stated he was up out of the bed today. checked with PCA pt. has not been out of bed today.)    Missed Time: Attempt    Comment:

## 2025-02-24 NOTE — ASSESSMENT & PLAN NOTE
2/24: Increase in creatinine to 1.85.  Will hold Entresto and continue to monitor for 24-48 hours.  Consult placed to nephrology.    Electrodesiccation And Curettage Text: The wound bed was treated with electrodesiccation and curettage after the biopsy was performed.

## 2025-02-24 NOTE — ASSESSMENT & PLAN NOTE
RUQ pain due to cholecystitis  Continue IV zosyn  Surgery to see, has consulted IR for cholecystotomy tube  NPO x meds  2/19: IR unable to place due to due to anatomy. GS will watch overnight, if worsens may need to go to OR. Continue antibiotics. Keep NPO.  2/20: Some improvement in WBC and Cr. Doubt will need emergent surgery at this point. Will transfer to med-surg. Continue iv antibiotics for now. Reassess in AM.  2/21: Currently on Zosyn and slow improvement.  Still has right upper quadrant tenderness.  Will need to follow-up with general surgery as outpatient.  Consider switching to Unasyn.  2/22: Surgery recommendations appreciated.  Drain placed.  Start on clear liquid diet  2/23: Surgery recommendation much appreciated.  Continue antibiotics.  Status post partial cholecystectomy and drain placement postoperative day 1.  Continue pain control.  Continue IV antibiotics.  2/24: Surgically cleared for discharge. Will go home with drain in place. Continue antibiotics and pain control.  Increase in Cr to 1.85.  Will hold Entresto and continue to monitor for 24-28 hours.

## 2025-02-24 NOTE — CARE PLAN
The patient's goals for the shift include      The clinical goals for the shift include Patient's abdominal pain will be maintained at or below a 4/10 on a 0-10 pain scale by end of shift, wich patient states is a tolerable level.    Over the shift, the patient did not make progress toward the following goals. Barriers to progression include . Recommendations to address these barriers include .

## 2025-02-24 NOTE — PROGRESS NOTES
"GENERAL SURGERY PROGRESS NOTE    Román Marinelli   1955   [id number]     Román Marinelli is a 69 y.o. male on day 6 of admission presenting with Cholecystitis.    Status post laparoscopic partial cholecystectomy, drain placement on 2/22/2025.    Subjective  No acute events overnight.  Continues to report feeling sore but denies abdominal pain.  Tolerated low-fat diet without nausea.    Review of Systems:  Review of Systems   Constitutional:  Negative for chills and fever.   Respiratory:  Negative for chest tightness and shortness of breath.    Cardiovascular:  Negative for chest pain.   Gastrointestinal:  Negative for abdominal pain and nausea.   Genitourinary:  Negative for difficulty urinating.   Skin:  Negative for rash.       Objective    Last Recorded Vitals  Blood pressure 110/68, pulse 78, temperature 36.4 °C (97.6 °F), temperature source Temporal, resp. rate 18, height 1.753 m (5' 9\"), weight 108 kg (239 lb), SpO2 91%.    Intake/Output last 3 Shifts:  I/O last 3 completed shifts:  In: 2656.8 (24.5 mL/kg) [P.O.:480; I.V.:2026.8 (18.7 mL/kg); IV Piggyback:150]  Out: 70 (0.6 mL/kg) [Drains:70]  Weight: 108.4 kg     Intake/Output Summary (Last 24 hours) at 2/24/2025 0730  Last data filed at 2/23/2025 1505  Gross per 24 hour   Intake 1761.78 ml   Output 20 ml   Net 1741.78 ml       Physical Exam  Constitutional:       General: He is not in acute distress.  HENT:      Head: Normocephalic.   Eyes:      General: No scleral icterus.  Cardiovascular:      Rate and Rhythm: Normal rate and regular rhythm.      Pulses: Normal pulses.   Pulmonary:      Effort: Pulmonary effort is normal. No respiratory distress.   Abdominal:      Palpations: Abdomen is soft.      Comments: Mildly distended.  Appropriately tender near incisions and near drain.  Drain in place with dark tinge serosanguineous output in bulb, serosanguineous output in tubing.  Incisions well-approximated with skin glue in place.   Musculoskeletal:         " General: No swelling.      Cervical back: Neck supple.   Skin:     General: Skin is warm and dry.   Neurological:      Mental Status: He is alert. Mental status is at baseline.         Relevant Results  Labs:  Results for orders placed or performed during the hospital encounter of 02/18/25 (from the past 24 hours)   POCT GLUCOSE   Result Value Ref Range    POCT Glucose 135 (H) 74 - 99 mg/dL   POCT GLUCOSE   Result Value Ref Range    POCT Glucose 131 (H) 74 - 99 mg/dL   POCT GLUCOSE   Result Value Ref Range    POCT Glucose 138 (H) 74 - 99 mg/dL   Magnesium   Result Value Ref Range    Magnesium 2.25 1.60 - 2.40 mg/dL   CBC and Auto Differential   Result Value Ref Range    WBC 10.4 4.4 - 11.3 x10*3/uL    nRBC 0.0 0.0 - 0.0 /100 WBCs    RBC 4.01 (L) 4.50 - 5.90 x10*6/uL    Hemoglobin 11.2 (L) 13.5 - 17.5 g/dL    Hematocrit 35.4 (L) 41.0 - 52.0 %    MCV 88 80 - 100 fL    MCH 27.9 26.0 - 34.0 pg    MCHC 31.6 (L) 32.0 - 36.0 g/dL    RDW 15.9 (H) 11.5 - 14.5 %    Platelets 317 150 - 450 x10*3/uL    Neutrophils % 61.7 40.0 - 80.0 %    Immature Granulocytes %, Automated 1.3 (H) 0.0 - 0.9 %    Lymphocytes % 24.3 13.0 - 44.0 %    Monocytes % 8.9 2.0 - 10.0 %    Eosinophils % 3.1 0.0 - 6.0 %    Basophils % 0.7 0.0 - 2.0 %    Neutrophils Absolute 6.42 1.20 - 7.70 x10*3/uL    Immature Granulocytes Absolute, Automated 0.13 0.00 - 0.70 x10*3/uL    Lymphocytes Absolute 2.53 1.20 - 4.80 x10*3/uL    Monocytes Absolute 0.93 0.10 - 1.00 x10*3/uL    Eosinophils Absolute 0.32 0.00 - 0.70 x10*3/uL    Basophils Absolute 0.07 0.00 - 0.10 x10*3/uL   Comprehensive Metabolic Panel   Result Value Ref Range    Glucose 111 (H) 74 - 99 mg/dL    Sodium 136 136 - 145 mmol/L    Potassium 3.7 3.5 - 5.3 mmol/L    Chloride 104 98 - 107 mmol/L    Bicarbonate 24 21 - 32 mmol/L    Anion Gap 12 10 - 20 mmol/L    Urea Nitrogen 24 (H) 6 - 23 mg/dL    Creatinine 1.85 (H) 0.50 - 1.30 mg/dL    eGFR 39 (L) >60 mL/min/1.73m*2    Calcium 7.5 (L) 8.6 - 10.3 mg/dL     Albumin 2.8 (L) 3.4 - 5.0 g/dL    Alkaline Phosphatase 104 33 - 136 U/L    Total Protein 5.9 (L) 6.4 - 8.2 g/dL    AST 17 9 - 39 U/L    Bilirubin, Total 0.5 0.0 - 1.2 mg/dL    ALT 25 10 - 52 U/L   POCT GLUCOSE   Result Value Ref Range    POCT Glucose 124 (H) 74 - 99 mg/dL       Images:  IR biliary cholecystostomy   Final Result   Ultrasound evaluation for percutaneous cholecystostomy tube placement   demonstrates unfavorable anatomy and a safe approach could not be   identified. No intervention performed.             Performed and dictated at [facility].        MACRO:   None        Signed by: Rubio Ch 2/19/2025 9:32 AM   Dictation workstation:   [id number]      CT abdomen pelvis w IV contrast   Final Result   Distended gallbladder demonstrating wall thickening, pericholecystic   fluid, and surrounding inflammation, suggesting cholecystitis. If   further evaluation is warranted, HIDA scan or ultrasound can be   offered. Partial collapse/consolidation of the right lower lobe.   Nonspecific ground-glass opacities as well as areas of   atelectasis/scarring in the remaining visible portions of the lung   bases. Colonic diverticulosis.   Hepatic steatosis.        MACRO:   None.        Signed by: Jarrod Strange 2/18/2025 10:27 AM   Dictation workstation:   [id number]          Assessment and Plan  Assessment & Plan  Cholecystitis    Diabetes mellitus (Multi)    Hyperlipidemia    Hypertension    CAD (coronary artery disease), native coronary artery    Dehydration    Chronic systolic heart failure    69 y.o. male with history of stroke 1 year ago, hypertension, and intermittent cocaine use (last use last week), presented with 3 days of right upper quadrant abdominal pain with CT imaging demonstrating signs concerning for acute cholecystitis.  Patient was unable to undergo IR placement of PERC cholecystostomy tube due to anatomical barriers.  Attempted conservative management however  patient continued to have pain and had a worsening leukocytosis.  Patient was brought to the OR yesterday for diagnostic laparoscopy, partial cholecystectomy, drain placement.  Doing well postoperatively.  Has had an increase in his creatinine past 2 days.  Recommend starting fluids.    Plan:  - Continue low-fat diet  - Continue IV antibiotics: Zosyn.  Will switch to oral Augmentin when ready for discharge.  - Pain control and antinausea medication  - Maintain drain, monitor output  - PT/OT  - Elevated creatinine, up from yesterday, IV fluids started.  Additional management per IMS.    Discussed with attending Dr. Helio Payne,  - PGY4  General Surgery

## 2025-02-24 NOTE — CARE PLAN
Problem: Pain  Goal: Takes deep breaths with improved pain control throughout the shift  Outcome: Progressing  Goal: Turns in bed with improved pain control throughout the shift  Outcome: Progressing  Goal: Walks with improved pain control throughout the shift  Outcome: Progressing  Goal: Performs ADL's with improved pain control throughout shift  Outcome: Progressing  Goal: Participates in PT with improved pain control throughout the shift  Outcome: Progressing  Goal: Free from opioid side effects throughout the shift  Outcome: Progressing  Goal: Free from acute confusion related to pain meds throughout the shift  Outcome: Progressing     Problem: Safety - Adult  Goal: Free from fall injury  Outcome: Progressing     Problem: Discharge Planning  Goal: Discharge to home or other facility with appropriate resources  Outcome: Progressing     Problem: Chronic Conditions and Co-morbidities  Goal: Patient's chronic conditions and co-morbidity symptoms are monitored and maintained or improved  Outcome: Progressing     Problem: Nutrition  Goal: Nutrient intake appropriate for maintaining nutritional needs  Outcome: Progressing     Problem: Skin  Goal: Decreased wound size/increased tissue granulation at next dressing change  Outcome: Progressing  Flowsheets (Taken 2/24/2025 0729)  Decreased wound size/increased tissue granulation at next dressing change:   Promote sleep for wound healing   Protective dressings over bony prominences   Utilize specialty bed per algorithm  Goal: Participates in plan/prevention/treatment measures  Outcome: Progressing  Flowsheets (Taken 2/24/2025 0729)  Participates in plan/prevention/treatment measures:   Discuss with provider PT/OT consult   Elevate heels   Increase activity/out of bed for meals  Goal: Prevent/manage excess moisture  Outcome: Progressing  Flowsheets (Taken 2/24/2025 0729)  Prevent/manage excess moisture:   Cleanse incontinence/protect with barrier cream   Monitor for/manage  infection if present   Follow provider orders for dressing changes  Goal: Prevent/minimize sheer/friction injuries  Outcome: Progressing  Flowsheets (Taken 2/24/2025 0729)  Prevent/minimize sheer/friction injuries:   Complete micro-shifts as needed if patient unable. Adjust patient position to relieve pressure points, not a full turn   Increase activity/out of bed for meals   Use pull sheet  Goal: Promote/optimize nutrition  Outcome: Progressing  Flowsheets (Taken 2/24/2025 0729)  Promote/optimize nutrition:   Assist with feeding   Monitor/record intake including meals   Consume > 50% meals/supplements  Goal: Promote skin healing  Outcome: Progressing  Flowsheets (Taken 2/24/2025 0729)  Promote skin healing:   Protective dressings over bony prominences   Assess skin/pad under line(s)/device(s)   Turn/reposition every 2 hours/use positioning/transfer devices     Problem: Diabetes  Goal: Achieve decreasing blood glucose levels by end of shift  Outcome: Progressing  Goal: Increase stability of blood glucose readings by end of shift  Outcome: Progressing  Goal: Decrease in ketones present in urine by end of shift  Outcome: Progressing  Goal: Maintain electrolyte levels within acceptable range throughout shift  Outcome: Progressing  Goal: Maintain glucose levels >70mg/dl to <250mg/dl throughout shift  Outcome: Progressing  Goal: No changes in neurological exam by end of shift  Outcome: Progressing  Goal: Learn about and adhere to nutrition recommendations by end of shift  Outcome: Progressing  Goal: Vital signs within normal range for age by end of shift  Outcome: Progressing  Goal: Increase self care and/or family involovement by end of shift  Outcome: Progressing  Goal: Receive DSME education by end of shift  Outcome: Progressing     Problem: Heart Failure  Goal: Improved gas exchange this shift  Outcome: Progressing  Goal: Improved urinary output this shift  Outcome: Progressing  Goal: Reduction in peripheral edema  within 24 hours  Outcome: Progressing  Goal: Report improvement of dyspnea/breathlessness this shift  Outcome: Progressing  Goal: Weight from fluid excess reduced over 2-3 days, then stabilize  Outcome: Progressing  Goal: Increase self care and/or family involvement in 24 hours  Outcome: Progressing   The patient's goals for the shift include      The clinical goals for the shift include pt will verbalize pain control this shift.

## 2025-02-24 NOTE — HH CARE COORDINATION
Home Care received a referral for Nursing. Unfortunately, we are unable to accept and process the referral at this time.    Reason:  Patient's Insurance is Out of Network    Patients, please reach out to the referring provider or your PCP to assist in obtaining an alternative home care agency and/or guidance to meet your needs.    Providers, please reach out to [facility] at [phone] with any questions regarding the declined referral.

## 2025-02-24 NOTE — PROGRESS NOTES
Surgery is clearing patient for discharge. Creatinine is uptrending but is otherwise close to medical readiness for discharge. Call to Franny at the [phone] to coordinate patient's discharge to the [facility] for rehab. Awaiting return call.

## 2025-02-24 NOTE — ASSESSMENT & PLAN NOTE
Last EF found was 35-40% in 2023.  Appears to be on Entresto. Will continue for now.  2/20: Appears euvolemic at present. Recheck in AM.  2/21: Appears to be tolerating Entresto.  2/24: Will hold Entresto due to increase in creatinine.  Continue to monitor for 24-48 hours.

## 2025-02-24 NOTE — DISCHARGE INSTRUCTIONS
Discharge Instructions    Incisions  Your incisions are closed with skin glue. Do not remove the glue - it will fall off on its own in 1-2 weeks.  You may take a shower or bath starting today.   Keep the incisions clean and dry.   You may (but do not have to) cover the incisions with gauze and tape.   Wear an abdominal binder as needed for comfort.    Pain  Do not drive while taking stronger pain medications (Tramadol, Percocet, Norco, Oxycodone, etc).   You may drive once you feel comfortable without stronger pain medications and can drive without any significant distractions related to your pain or surgical sites.  You may alternate acetaminophen and ibuprofen for pain control, as long as you are able to take either medication.     Diet  Resume your normal diet. Your appetite may not fully return immediately.   Please drink plenty of fluids to maintain hydration.    Physical Activity  Limit physical activity that would involve stretching the incision(s) for 2 weeks.  No lifting over 10-15 lb or strenuous physical activity for at least 4 weeks after surgery. If there is pain with increased physical effort, please resume light duty restrictions.   If you need FMLA, short-term disability, or other paperwork completed, please provide the paperwork or instructions to the office in person or fax to [phone].    Follow Up  Follow up with Dr. Migdalia Cadet in the office per the instructions above. Please call the office to follow up sooner if there are concerns you would like to discuss.    Call Provider  If you are experiencing fever (100.4F or higher).  If you are experiencing significantly worsening pain, nausea or vomiting.  If the incision(s) appear reddened, swollen or are draining.  If you have very loose or watery bowel movements.  If you have any new concerning symptoms.

## 2025-02-24 NOTE — PROGRESS NOTES
Román Marinelli is a 69 y.o. male on day 6 of admission presenting with Cholecystitis.       Subjective   Román Marinelli is a 69 y.o. male with PMHx s/f cocaine use, coronary artery disease, cardiomyopathy, diabetes type 2, hyperlipidemia presenting with abdominal pain fatigue malaise.  Patient came to hospital Red eye right upper quadrant abdominal pain ongoing for about 3 days.  The pain is becoming progressively worse could not take it anymore.  Patient has also had some vomiting associated with the pain.  He is denying any fevers chills or rigors but has not been eating or drinking very well.  He denies any chest pain cough any sputum production.  Has not had any diarrhea or urinary symptoms.  Emergency department workup demonstrated probable acute cholecystitis General Surgery was consulted patient started on IV Zosyn given IV fluids and hydration.  Case was discussed with the ED provider patient will be admitted length of stay likely to exceed 2 midnights.  Patient will need continued IV antibiotics interventional procedure with radiology to perform cholecystotomy tube and allow time for antibiotics to treat the underlying infection.     ED Course (Summary):   Vitals on presentation: Temperature 99 heart rate 105 respiratory rate 18 blood pressure 166/112 SpO2 95% room air  Labs: Glucose 169 creatinine 1.43 total bilirubin 1.8 initial troponin 60 9 repeat troponin 67 CBC significant for leukocytosis white blood cell count 21.8 platelets 293  Imaging: CT of abdomen and pelvis showing distended gallbladder demonstrating wall thickening pericholecystic fluid nonspecific groundglass opacities and atelectasis and remaining portion of the lung bases  Interventions: Patient given IV fluids started on IV Zosyn ED provider consulted with general surgery will plan for cholecystotomy tube and admission    2/19: Pt seen. Had received pain medication and is groggy. Does not withdrawal from abdominal exam, no peritoneal  signs. IR unable to place perc sugey drain due to bowel loop in the way. GS aware. Will watch pt overnight on iv antibiotics. If worsens may need to accept risk and go to OR. Continue iv antibiotics. Will recheck in AM.    2/20: Pt seen. More awake today. Discussed with GS. Seems to be responding to antibiotics. Started clears. Will transfer to med-surg. Tolerating clears but still has some abdominal pain with po intake. Current plan is to treat overnight and consider eventual release home on oral antibiotics. Will plan sugey as outpatient after completing antibiotics. Reassess in AM.    2/21: Patient seen.  Still has some right upper quadrant pain but appears to be tolerating clears.  Advance diet per general surgery.  Currently on Zosyn.  Consider switching to Unasyn anticipating switch to Augmentin on discharge.  [facility] working on discharge to SNF, [facility]  Await further input.     2/22: No acute events overnight.  Patient was in the OR most of the day.  I did get a message from the surgery team that the patient had gangrenous gallbladder and was hence had a drain placed.  Will continue IV antibiotics.  Monitor for another 24 to 48 hours.    2/23: No acute events overnight.  Patient's pain is much improved with the current pain regimen.  Drain is in place.  Surgery recommendations much appreciated.  Continue IV antibiotics.  Encourage out of bed.  Bowel regimen.    2/24: No acute events overnight.  Cleared for discharge from a surgical standpoint.  Will go home with drain.  Continue IV antibiotics.  Cr trending up with an increase to 1.85.  Will hold Entresto and continue to monitor for another 24-48 hours.  [facility].      Objective     Last Recorded Vitals  /77 (BP Location: Left arm, Patient Position: Lying)   Pulse 72   Temp 36.1 °C (96.9 °F) (Temporal)   Resp 18   Wt 108 kg (239 lb)   SpO2 93%   Intake/Output last 3 Shifts:    Intake/Output  Summary (Last 24 hours) at 2/24/2025 1509  Last data filed at 2/24/2025 1435  Gross per 24 hour   Intake 892.08 ml   Output --   Net 892.08 ml       Admission Weight  Weight: 112 kg (247 lb) (02/18/25 0811)    Daily Weight  02/22/25 : 108 kg (239 lb)    Electrocardiogram, 12-lead PRN ACS symptoms  Sinus rhythm  Ventricular premature complex  Probable left atrial enlargement  Abnormal R-wave progression, late transition  Left ventricular hypertrophy  Inferior infarct, old    Baseline wander in lead(s) V3    Confirmed by Pollo Flores ([id number]) on 2/24/2025 1:51:30 PM    CONSTITUTIONAL - Alert, in no acute distress, obese  EYES - pupils are equal and reactive to light  CHEST - lungs clear to auscultation bilaterally with no wheezes, crackles or rales, good effort  CARDIAC - regular rate and regular rhythm, no murmur, gallops or rubs  ABDOMEN - large abdomen, overall soft, drain in place with serosanguinous drainage   EXTREMITIES - no edema, no deformities  NEUROLOGICAL - alert, oriented x3 and no acute focal signs  PSYCHIATRIC - alert, pleasant and cordial, age-appropriate       Lab Results  Results for orders placed or performed during the hospital encounter of 02/18/25 (from the past 24 hours)   POCT GLUCOSE   Result Value Ref Range    POCT Glucose 131 (H) 74 - 99 mg/dL   POCT GLUCOSE   Result Value Ref Range    POCT Glucose 138 (H) 74 - 99 mg/dL   Magnesium   Result Value Ref Range    Magnesium 2.25 1.60 - 2.40 mg/dL   CBC and Auto Differential   Result Value Ref Range    WBC 10.4 4.4 - 11.3 x10*3/uL    nRBC 0.0 0.0 - 0.0 /100 WBCs    RBC 4.01 (L) 4.50 - 5.90 x10*6/uL    Hemoglobin 11.2 (L) 13.5 - 17.5 g/dL    Hematocrit 35.4 (L) 41.0 - 52.0 %    MCV 88 80 - 100 fL    MCH 27.9 26.0 - 34.0 pg    MCHC 31.6 (L) 32.0 - 36.0 g/dL    RDW 15.9 (H) 11.5 - 14.5 %    Platelets 317 150 - 450 x10*3/uL    Neutrophils % 61.7 40.0 - 80.0 %    Immature Granulocytes %, Automated 1.3 (H) 0.0 - 0.9 %    Lymphocytes % 24.3 13.0 -  44.0 %    Monocytes % 8.9 2.0 - 10.0 %    Eosinophils % 3.1 0.0 - 6.0 %    Basophils % 0.7 0.0 - 2.0 %    Neutrophils Absolute 6.42 1.20 - 7.70 x10*3/uL    Immature Granulocytes Absolute, Automated 0.13 0.00 - 0.70 x10*3/uL    Lymphocytes Absolute 2.53 1.20 - 4.80 x10*3/uL    Monocytes Absolute 0.93 0.10 - 1.00 x10*3/uL    Eosinophils Absolute 0.32 0.00 - 0.70 x10*3/uL    Basophils Absolute 0.07 0.00 - 0.10 x10*3/uL   Comprehensive Metabolic Panel   Result Value Ref Range    Glucose 111 (H) 74 - 99 mg/dL    Sodium 136 136 - 145 mmol/L    Potassium 3.7 3.5 - 5.3 mmol/L    Chloride 104 98 - 107 mmol/L    Bicarbonate 24 21 - 32 mmol/L    Anion Gap 12 10 - 20 mmol/L    Urea Nitrogen 24 (H) 6 - 23 mg/dL    Creatinine 1.85 (H) 0.50 - 1.30 mg/dL    eGFR 39 (L) >60 mL/min/1.73m*2    Calcium 7.5 (L) 8.6 - 10.3 mg/dL    Albumin 2.8 (L) 3.4 - 5.0 g/dL    Alkaline Phosphatase 104 33 - 136 U/L    Total Protein 5.9 (L) 6.4 - 8.2 g/dL    AST 17 9 - 39 U/L    Bilirubin, Total 0.5 0.0 - 1.2 mg/dL    ALT 25 10 - 52 U/L   POCT GLUCOSE   Result Value Ref Range    POCT Glucose 124 (H) 74 - 99 mg/dL   POCT GLUCOSE   Result Value Ref Range    POCT Glucose 153 (H) 74 - 99 mg/dL      Scheduled medications  acetaminophen, 650 mg, oral, q6h  aspirin, 81 mg, oral, Daily  cholecalciferol, 4,000 Units, oral, Daily  heparin (porcine), 5,000 Units, subcutaneous, q8h  insulin lispro, 0-5 Units, subcutaneous, TID AC  isosorbide mononitrate ER, 30 mg, oral, Daily  lidocaine, 1 patch, transdermal, Daily  metoprolol tartrate, 25 mg, oral, BID  pantoprazole, 40 mg, oral, Daily before breakfast   Or  pantoprazole, 40 mg, intravenous, Daily before breakfast  piperacillin-tazobactam, 3.375 g, intravenous, q6h  polyethylene glycol, 17 g, oral, Daily  [Held by provider] sacubitriL-valsartan, 1 tablet, oral, BID      Continuous medications  lactated Ringer's, 125 mL/hr, Last Rate: 125 mL/hr (02/24/25 1347)      PRN medications  PRN medications: bisacodyl,  bisacodyl, dextrose, dextrose, glucagon, glucagon, guaiFENesin, HYDROmorphone, melatonin, ondansetron ODT **OR** ondansetron, oxyCODONE, oxyCODONE       Assessment/Plan     Assessment & Plan  Cholecystitis  RUQ pain due to cholecystitis  Continue IV zosyn  Surgery to see, has consulted IR for cholecystotomy tube  NPO x meds  2/19: IR unable to place due to due to anatomy. GS will watch overnight, if worsens may need to go to OR. Continue antibiotics. Keep NPO.  2/20: Some improvement in WBC and Cr. Doubt will need emergent surgery at this point. Will transfer to med-surg. Continue iv antibiotics for now. Reassess in AM.  2/21: Currently on Zosyn and slow improvement.  Still has right upper quadrant tenderness.  Will need to follow-up with general surgery as outpatient.  Consider switching to Unasyn.  2/22: Surgery recommendations appreciated.  Drain placed.  Start on clear liquid diet  2/23: Surgery recommendation much appreciated.  Continue antibiotics.  Status post partial cholecystectomy and drain placement postoperative day 1.  Continue pain control.  Continue IV antibiotics.  2/24: Surgically cleared for discharge. Will go home with drain in place. Continue antibiotics and pain control.  Increase in Cr to 1.85.  Will hold Entresto and continue to monitor for 24-28 hours.    Diabetes mellitus (Multi)  By records only. Does not appear to be on oral medication.  Last recorded A1c was 6.4 in 2025  Not currently on sliding scale.  2/19: Will place on SSI, may have less glycemic control while ill.  2/21: Glycemic control is pretty good with SSI.  Hyperlipidemia  Continue statin.  Hypertension  Continue home meds, adjust as needed.    CAD (coronary artery disease), native coronary artery  CAD w elevated troponin  Check UDS given hx cocaine use  Pt not taking meds, will consult cardiology  Continue beta blocker if UDS negative for cocaine  2/19: Awaiting cards consult.  2/20: Revised cardiac risk index appears to be  about 4 (For intraperitoneal surgery, hx CHF, MI, occasional insulin), risk of major cardiac event is 15%. Does not appear to be in active CHF or coronary ischemia. Appreciate cards input.  2/21: Asymptomatic.  Dehydration  Dehydration due to acute illness  IV hydration, monitor for overload, last ef 35-40%  2/20: Appears euvolemic at present.  Chronic systolic heart failure  Last EF found was 35-40% in 2023.  Appears to be on Entresto. Will continue for now.  2/20: Appears euvolemic at present. Recheck in AM.  2/21: Appears to be tolerating Entresto.  2/24: Will hold Entresto due to increase in creatinine.  Continue to monitor for 24-48 hours.    QASIM (acute kidney injury) ([facility])  2/24: Increase in creatinine to 1.85.  Will hold Entresto and continue to monitor for 24-48 hours.  Consult placed to nephrology.     DVT Prophylaxis   - heparin       Jennifer Pope   Medical Student, OMS-III      Pt seen and examined  with my Medical student . I have modified the note to reflect my documentation of HPI and assessment and plan.    Keyur Magallon MD [facility]

## 2025-02-25 ENCOUNTER — PHARMACY VISIT (OUTPATIENT)
Dept: PHARMACY | Facility: CLINIC | Age: 70
End: 2025-02-25
Payer: MEDICAID

## 2025-02-25 VITALS
WEIGHT: 239 LBS | RESPIRATION RATE: 20 BRPM | HEART RATE: 91 BPM | HEIGHT: 69 IN | OXYGEN SATURATION: 96 % | BODY MASS INDEX: 35.4 KG/M2 | TEMPERATURE: 98 F | SYSTOLIC BLOOD PRESSURE: 164 MMHG | DIASTOLIC BLOOD PRESSURE: 85 MMHG

## 2025-02-25 LAB
ANION GAP SERPL CALC-SCNC: 10 MMOL/L (ref 10–20)
BUN SERPL-MCNC: 17 MG/DL (ref 6–23)
CALCIUM SERPL-MCNC: 7.7 MG/DL (ref 8.6–10.3)
CHLORIDE SERPL-SCNC: 107 MMOL/L (ref 98–107)
CO2 SERPL-SCNC: 24 MMOL/L (ref 21–32)
CREAT SERPL-MCNC: 1.46 MG/DL (ref 0.5–1.3)
EGFRCR SERPLBLD CKD-EPI 2021: 52 ML/MIN/1.73M*2
GLUCOSE BLD MANUAL STRIP-MCNC: 102 MG/DL (ref 74–99)
GLUCOSE BLD MANUAL STRIP-MCNC: 110 MG/DL (ref 74–99)
GLUCOSE BLD MANUAL STRIP-MCNC: 114 MG/DL (ref 74–99)
GLUCOSE SERPL-MCNC: 122 MG/DL (ref 74–99)
POTASSIUM SERPL-SCNC: 3.4 MMOL/L (ref 3.5–5.3)
SODIUM SERPL-SCNC: 138 MMOL/L (ref 136–145)

## 2025-02-25 PROCEDURE — 2500000001 HC RX 250 WO HCPCS SELF ADMINISTERED DRUGS (ALT 637 FOR MEDICARE OP): Performed by: INTERNAL MEDICINE

## 2025-02-25 PROCEDURE — 2500000004 HC RX 250 GENERAL PHARMACY W/ HCPCS (ALT 636 FOR OP/ED): Performed by: INTERNAL MEDICINE

## 2025-02-25 PROCEDURE — 97530 THERAPEUTIC ACTIVITIES: CPT | Mod: GO,CO

## 2025-02-25 PROCEDURE — 80048 BASIC METABOLIC PNL TOTAL CA: CPT | Performed by: INTERNAL MEDICINE

## 2025-02-25 PROCEDURE — 36415 COLL VENOUS BLD VENIPUNCTURE: CPT | Performed by: INTERNAL MEDICINE

## 2025-02-25 PROCEDURE — 97535 SELF CARE MNGMENT TRAINING: CPT | Mod: GO,CO

## 2025-02-25 PROCEDURE — 97116 GAIT TRAINING THERAPY: CPT | Mod: GP,CQ

## 2025-02-25 PROCEDURE — 2500000002 HC RX 250 W HCPCS SELF ADMINISTERED DRUGS (ALT 637 FOR MEDICARE OP, ALT 636 FOR OP/ED): Performed by: SURGERY

## 2025-02-25 PROCEDURE — 2500000005 HC RX 250 GENERAL PHARMACY W/O HCPCS: Performed by: SURGERY

## 2025-02-25 PROCEDURE — 2500000001 HC RX 250 WO HCPCS SELF ADMINISTERED DRUGS (ALT 637 FOR MEDICARE OP): Performed by: SURGERY

## 2025-02-25 PROCEDURE — 99239 HOSP IP/OBS DSCHRG MGMT >30: CPT | Performed by: INTERNAL MEDICINE

## 2025-02-25 PROCEDURE — 82947 ASSAY GLUCOSE BLOOD QUANT: CPT

## 2025-02-25 PROCEDURE — RXMED WILLOW AMBULATORY MEDICATION CHARGE

## 2025-02-25 PROCEDURE — 97110 THERAPEUTIC EXERCISES: CPT | Mod: GP,CQ

## 2025-02-25 RX ORDER — OXYCODONE HYDROCHLORIDE 5 MG/1
5 TABLET ORAL EVERY 6 HOURS PRN
Qty: 15 TABLET | Refills: 0 | Status: SHIPPED | OUTPATIENT
Start: 2025-02-25 | End: 2025-03-02

## 2025-02-25 RX ORDER — AMOXICILLIN AND CLAVULANATE POTASSIUM 875; 125 MG/1; MG/1
1 TABLET, FILM COATED ORAL 2 TIMES DAILY
Qty: 10 TABLET | Refills: 0 | Status: SHIPPED | OUTPATIENT
Start: 2025-02-25 | End: 2025-03-02

## 2025-02-25 RX ORDER — POTASSIUM CHLORIDE 750 MG/1
40 TABLET, FILM COATED, EXTENDED RELEASE ORAL ONCE
Status: COMPLETED | OUTPATIENT
Start: 2025-02-25 | End: 2025-02-25

## 2025-02-25 RX ADMIN — HEPARIN SODIUM 5000 UNITS: 5000 INJECTION, SOLUTION INTRAVENOUS; SUBCUTANEOUS at 15:35

## 2025-02-25 RX ADMIN — PIPERACILLIN SODIUM AND TAZOBACTAM SODIUM 3.38 G: 3; .375 INJECTION, SOLUTION INTRAVENOUS at 03:55

## 2025-02-25 RX ADMIN — PIPERACILLIN SODIUM AND TAZOBACTAM SODIUM 3.38 G: 3; .375 INJECTION, SOLUTION INTRAVENOUS at 08:50

## 2025-02-25 RX ADMIN — ASPIRIN 81 MG: 81 TABLET, COATED ORAL at 08:51

## 2025-02-25 RX ADMIN — ACETAMINOPHEN 650 MG: 325 TABLET ORAL at 15:02

## 2025-02-25 RX ADMIN — LIDOCAINE 1 PATCH: 560 PATCH PERCUTANEOUS; TOPICAL; TRANSDERMAL at 08:50

## 2025-02-25 RX ADMIN — HEPARIN SODIUM 5000 UNITS: 5000 INJECTION, SOLUTION INTRAVENOUS; SUBCUTANEOUS at 08:51

## 2025-02-25 RX ADMIN — POTASSIUM CHLORIDE 40 MEQ: 750 TABLET, FILM COATED, EXTENDED RELEASE ORAL at 10:37

## 2025-02-25 RX ADMIN — ISOSORBIDE MONONITRATE 30 MG: 30 TABLET, EXTENDED RELEASE ORAL at 08:51

## 2025-02-25 RX ADMIN — PIPERACILLIN SODIUM AND TAZOBACTAM SODIUM 3.38 G: 3; .375 INJECTION, SOLUTION INTRAVENOUS at 13:54

## 2025-02-25 RX ADMIN — ACETAMINOPHEN 650 MG: 325 TABLET ORAL at 03:56

## 2025-02-25 RX ADMIN — OXYCODONE HYDROCHLORIDE 5 MG: 5 TABLET ORAL at 15:02

## 2025-02-25 RX ADMIN — Medication 4000 UNITS: at 08:51

## 2025-02-25 ASSESSMENT — COGNITIVE AND FUNCTIONAL STATUS - GENERAL
TURNING FROM BACK TO SIDE WHILE IN FLAT BAD: A LITTLE
DAILY ACTIVITIY SCORE: 17
WALKING IN HOSPITAL ROOM: A LITTLE
DRESSING REGULAR UPPER BODY CLOTHING: A LOT
CLIMB 3 TO 5 STEPS WITH RAILING: TOTAL
MOBILITY SCORE: 13
EATING MEALS: A LITTLE
HELP NEEDED FOR BATHING: A LOT
DAILY ACTIVITIY SCORE: 14
DRESSING REGULAR LOWER BODY CLOTHING: A LOT
DRESSING REGULAR LOWER BODY CLOTHING: A LITTLE
CLIMB 3 TO 5 STEPS WITH RAILING: A LOT
HELP NEEDED FOR BATHING: A LITTLE
TURNING FROM BACK TO SIDE WHILE IN FLAT BAD: A LITTLE
MOVING TO AND FROM BED TO CHAIR: A LITTLE
PERSONAL GROOMING: A LITTLE
TOILETING: A LITTLE
MOVING FROM LYING ON BACK TO SITTING ON SIDE OF FLAT BED WITH BEDRAILS: A LITTLE
STANDING UP FROM CHAIR USING ARMS: A LITTLE
WALKING IN HOSPITAL ROOM: A LOT
EATING MEALS: A LITTLE
STANDING UP FROM CHAIR USING ARMS: A LOT
MOVING TO AND FROM BED TO CHAIR: A LOT
TOILETING: A LOT
PERSONAL GROOMING: A LITTLE
MOBILITY SCORE: 17
MOVING FROM LYING ON BACK TO SITTING ON SIDE OF FLAT BED WITH BEDRAILS: A LITTLE
DRESSING REGULAR UPPER BODY CLOTHING: A LOT

## 2025-02-25 ASSESSMENT — PAIN SCALES - GENERAL
PAINLEVEL_OUTOF10: 5 - MODERATE PAIN
PAINLEVEL_OUTOF10: 0 - NO PAIN
PAINLEVEL_OUTOF10: 0 - NO PAIN
PAINLEVEL_OUTOF10: 8
PAINLEVEL_OUTOF10: 0 - NO PAIN

## 2025-02-25 ASSESSMENT — PAIN - FUNCTIONAL ASSESSMENT
PAIN_FUNCTIONAL_ASSESSMENT: 0-10

## 2025-02-25 ASSESSMENT — ENCOUNTER SYMPTOMS
SHORTNESS OF BREATH: 0
CHEST TIGHTNESS: 0
FEVER: 0
CHILLS: 0
NAUSEA: 0
DIFFICULTY URINATING: 0
ABDOMINAL PAIN: 0

## 2025-02-25 ASSESSMENT — PAIN DESCRIPTION - ORIENTATION: ORIENTATION: RIGHT

## 2025-02-25 ASSESSMENT — ACTIVITIES OF DAILY LIVING (ADL): HOME_MANAGEMENT_TIME_ENTRY: 16

## 2025-02-25 ASSESSMENT — PAIN DESCRIPTION - LOCATION: LOCATION: ABDOMEN

## 2025-02-25 NOTE — PROGRESS NOTES
Occupational Therapy    OT Treatment    Patient Name: Román Marinelli  MRN: [id number]  Department: [facility] 3 E  Room: [address] [place] [address]  Today's Date: 2/25/2025  Time Calculation  Start Time: 1128  Stop Time: 1200  Time Calculation (min): 32 min        Assessment:  Barriers to Discharge Home: Physical needs  End of Session Communication: Bedside nurse, PCT/NA/CTA  End of Session Patient Position: Up in chair, Alarm on     Plan:  Treatment Interventions: ADL retraining, Functional transfer training, UE strengthening/ROM, Endurance training, Compensatory technique education, Neuromuscular reeducation      Subjective   Previous Visit Info:  OT Last Visit  OT Received On: 02/25/25  General:  General  OT Missed Visit: Yes  Missed Visit Reason: Patient refused (adamantly declined stated he was up out of the bed today. checked with PCA pt. has not been out of bed today.)  Prior to Session Communication: Bedside nurse  Patient Position Received: Up in chair, Alarm on  General Comment: pt. agreeable to OT, transfers remain unsteady and require 2 person assist. pt. is impulsive and lack insight to safety.  Precautions:        Date/Time Vitals Session Patient Position Pulse Resp SpO2 BP MAP (mmHg)    02/25/25 1340 --  --  91  20  96 %  164/85  111                 Pain:  Pain Assessment  Pain Assessment: 0-10  0-10 (Numeric) Pain Score: 0 - No pain    Objective    Cognition:  Cognition  Orientation Level: Oriented X4  Coordination:     Activities of Daily Living: Toileting  Toileting Level of Assistance: Close supervision  Where Assessed: Toilet  Toileting Comments: pt. completed hygiene seated he did not want any assistance. provided close supervision due pt. fall risk  Functional Standing Tolerance:     Bed Mobility/Transfers:      Transfers  Transfer: Yes  Transfer 1  Transfer From 1: Chair with arms to  Transfer to 1: Stand, Toilet  Technique 1: Sit to stand  Transfer Device 1: Quad cane  Transfer Level of Assistance 1: Moderate  assistance, +2, Moderate verbal cues, Minimal tactile cues  Trials/Comments 1: LE's  L leg buckling while R  hip/LE unsteady. pt. unsafely drops down on toilet and need cueing for safety  Transfers 2  Transfer From 2: Toilet to  Transfer to 2: Chair with arms  Technique 2: Sit to stand, Stand to sit  Transfer Device 2: Quad cane  Transfer Level of Assistance 2: Moderate assistance, +2  Trials/Comments 2: poor safety with transfers.                 Outcome Measures:[facility] Daily Activity  Putting on and taking off regular lower body clothing: A lot  Bathing (including washing, rinsing, drying): A lot  Putting on and taking off regular upper body clothing: A lot  Toileting, which includes using toilet, bedpan or urinal: A lot  Taking care of personal grooming such as brushing teeth: A little  Eating Meals: A little  Daily Activity - Total Score: 14        Education Documentation  Precautions, taught by BRUCE Worrell at 2/25/2025  2:12 PM.  Learner: Patient  Readiness: Acceptance  Method: Explanation  Response: Verbalizes Understanding, Needs Reinforcement    Body Mechanics, taught by BRUCE Worrell at 2/25/2025  2:12 PM.  Learner: Patient  Readiness: Acceptance  Method: Explanation  Response: Verbalizes Understanding, Needs Reinforcement    ADL Training, taught by BRUCE Worrell at 2/25/2025  2:12 PM.  Learner: Patient  Readiness: Acceptance  Method: Explanation  Response: Verbalizes Understanding, Needs Reinforcement    Education Comments  No comments found.        OP EDUCATION:       Goals:  Encounter Problems       Encounter Problems (Active)       ADLs       Patient with complete lower body dressing with stand by assist level of assistance donning and doffing all LE clothes  with PRN adaptive equipment while edge of bed  (Not Progressing)       Start:  02/19/25    Expected End:  03/05/25            Patient will complete toileting including hygiene clothing management/hygiene with stand  by assist level of assistance and PRN AE. (Progressing)       Start:  02/19/25    Expected End:  03/05/25               MOBILITY       Patient will perform Functional mobility  Household distances with stand by assist level of assistance and least restrictive device in order to improve safety and functional mobility. (Progressing)       Start:  02/19/25    Expected End:  03/05/25               TRANSFERS       Patient will complete functional transfers with least restrictive device with stand by assist level of assistance. (Progressing)       Start:  02/19/25    Expected End:  03/05/25

## 2025-02-25 NOTE — PROGRESS NOTES
Physical Therapy    Physical Therapy Treatment    Patient Name: Román Marinelli  MRN: [id number]  Department: [facility] 3 E  Room: [facility]  Today's Date: 2/25/2025  Time Calculation  Start Time: 1126  Stop Time: 1159  Time Calculation (min): 33 min         Assessment/Plan   PT Assessment  End of Session Communication: Bedside nurse  Assessment Comment: patient has poor safety awareness . requries verbal cues for all safety and follow through. continue to increase safety awareness  End of Session Patient Position: Up in chair, Alarm on  PT Plan  Inpatient/Swing Bed or Outpatient: Inpatient  PT Plan  Treatment/Interventions: Bed mobility, Transfer training, Gait training, Stair training, Balance training, Strengthening, Endurance training, Therapeutic exercise, Therapeutic activity  PT Plan: Ongoing PT  PT Frequency: 5 times per week  PT Discharge Recommendations: High intensity level of continued care  Equipment Recommended upon Discharge:  (TBD - has WW and WBQC at home.)  PT Recommended Transfer Status: Assist x1  PT - OK to Discharge: Yes (To next level of care when medically cleared.)      General Visit Information:   PT  Visit  PT Received On: 02/25/25  Response to Previous Treatment: Patient reporting fatigue but able to participate.  General  Prior to Session Communication: Bedside nurse  Patient Position Received: Up in chair, Alarm on  General Comment: patient  agreeable to therapy.   Subjective   Precautions:               Objective   Pain:  Pain Assessment  Pain Assessment: 0-10  0-10 (Numeric) Pain Score: 0 - No pain (no comlaints of pain)  Cognition:  Cognition  Orientation Level: Oriented X4    Activity Tolerance:  Activity Tolerance  Endurance: Tolerates 10 - 20 min exercise with multiple rests  Treatments:  patient performed LILLIAN LE ankle pumps, marches, LAQ, hip adduction 20 reps each.  transfer training sit to stand with min assist. x2 . gait training with quad cane 20 feet with mod assist x2 , then  min  assist x2 fr bathroom and mod assist x2  15 feet out of bathroom in to chair. with  left Le threatening to buckle.  Outcome Measures:  [facility] Basic Mobility  Turning from your back to your side while in a flat bed without using bedrails: A little  Moving from lying on your back to sitting on the side of a flat bed without using bedrails: A little  Moving to and from bed to chair (including a wheelchair): A lot  Standing up from a chair using your arms (e.g. wheelchair or bedside chair): A lot  To walk in hospital room: A lot  Climbing 3-5 steps with railing: Total  Basic Mobility - Total Score: 13    Education Documentation  Home Exercise Program, taught by Kelly Mccarty PTA at 2/25/2025  1:19 PM.  Learner: Patient  Readiness: Acceptance  Method: Explanation  Response: Verbalizes Understanding    Body Mechanics, taught by Kelly Mccarty PTA at 2/25/2025  1:19 PM.  Learner: Patient  Readiness: Acceptance  Method: Explanation  Response: Verbalizes Understanding    Mobility Training, taught by Kelly Mccarty PTA at 2/25/2025  1:19 PM.  Learner: Patient  Readiness: Acceptance  Method: Explanation  Response: Verbalizes Understanding    Education Comments  No comments found.        OP EDUCATION:       Encounter Problems       Encounter Problems (Active)       To improve strength, balance, endurance, overall mobility:        Patient will participate in B LE exercises x 15 repetitions to sit <> stand with SBA.  (Progressing)       Start:  02/19/25    Expected End:  03/05/25            Patient will complete supine <> sit with SBA.  (Progressing)       Start:  02/19/25    Expected End:  03/05/25            Patient will ambulate 50 feet x 2 with WW vs quad cane with SBA.  (Progressing)       Start:  02/19/25    Expected End:  03/05/25            Patient will negotiate 3 steps with 1 rail and SBA.  (Progressing)       Start:  02/19/25    Expected End:  03/05/25

## 2025-02-25 NOTE — CARE PLAN
Problem: Safety - Adult  Goal: Free from fall injury  Outcome: Progressing     Problem: Discharge Planning  Goal: Discharge to home or other facility with appropriate resources  Outcome: Progressing     Problem: Chronic Conditions and Co-morbidities  Goal: Patient's chronic conditions and co-morbidity symptoms are monitored and maintained or improved  Outcome: Progressing     Problem: Nutrition  Goal: Nutrient intake appropriate for maintaining nutritional needs  Outcome: Progressing

## 2025-02-25 NOTE — ASSESSMENT & PLAN NOTE
RUQ pain due to cholecystitis  Continue IV zosyn  Surgery to see, has consulted IR for cholecystotomy tube  NPO x meds  2/19: IR unable to place due to due to anatomy. GS will watch overnight, if worsens may need to go to OR. Continue antibiotics. Keep NPO.  2/20: Some improvement in WBC and Cr. Doubt will need emergent surgery at this point. Will transfer to med-surg. Continue iv antibiotics for now. Reassess in AM.  2/21: Currently on Zosyn and slow improvement.  Still has right upper quadrant tenderness.  Will need to follow-up with general surgery as outpatient.  Consider switching to Unasyn.  2/22: Surgery recommendations appreciated.  Drain placed.  Start on clear liquid diet  2/23: Surgery recommendation much appreciated.  Continue antibiotics.  Status post partial cholecystectomy and drain placement postoperative day 1.  Continue pain control.  Continue IV antibiotics.  2/24: Surgically cleared for discharge. Will go home with drain in place. Continue antibiotics and pain control.  Increase in Cr to 1.85.  Will hold Entresto and continue to monitor for 24-28 hours.    2/25: Per surgery, drain likely to be removed today.  Continue antibiotics and pain control.  Cr improved to 1.46.  Will continue to hold Entresto.  Clinically ready for discharge.  [facility].

## 2025-02-25 NOTE — ASSESSMENT & PLAN NOTE
2/24: Increase in creatinine to 1.85.  Will hold Entresto and continue to monitor for 24-48 hours.  Consult placed to nephrology.   2/25: Cr improved to 1.46.  Will continue to hold Entresto.

## 2025-02-25 NOTE — CARE PLAN
The patient's goals for the shift include      The clinical goals for the shift include pt will verbalize pain control this shift.    Over the shift, the patient did not make progress toward the following goals. Barriers to progression include . Recommendations to address these barriers include .

## 2025-02-25 NOTE — PROGRESS NOTES
Román Marinelli is a 69 y.o. male on day 7 of admission presenting with Cholecystitis.       Subjective   Román Marinelli is a 69 y.o. male with PMHx s/f cocaine use, coronary artery disease, cardiomyopathy, diabetes type 2, hyperlipidemia presenting with abdominal pain fatigue malaise.  Patient came to hospital Red eye right upper quadrant abdominal pain ongoing for about 3 days.  The pain is becoming progressively worse could not take it anymore.  Patient has also had some vomiting associated with the pain.  He is denying any fevers chills or rigors but has not been eating or drinking very well.  He denies any chest pain cough any sputum production.  Has not had any diarrhea or urinary symptoms.  Emergency department workup demonstrated probable acute cholecystitis General Surgery was consulted patient started on IV Zosyn given IV fluids and hydration.  Case was discussed with the ED provider patient will be admitted length of stay likely to exceed 2 midnights.  Patient will need continued IV antibiotics interventional procedure with radiology to perform cholecystotomy tube and allow time for antibiotics to treat the underlying infection.     ED Course (Summary):   Vitals on presentation: Temperature 99 heart rate 105 respiratory rate 18 blood pressure 166/112 SpO2 95% room air  Labs: Glucose 169 creatinine 1.43 total bilirubin 1.8 initial troponin 60 9 repeat troponin 67 CBC significant for leukocytosis white blood cell count 21.8 platelets 293  Imaging: CT of abdomen and pelvis showing distended gallbladder demonstrating wall thickening pericholecystic fluid nonspecific groundglass opacities and atelectasis and remaining portion of the lung bases  Interventions: Patient given IV fluids started on IV Zosyn ED provider consulted with general surgery will plan for cholecystotomy tube and admission    2/19: Pt seen. Had received pain medication and is groggy. Does not withdrawal from abdominal exam, no peritoneal  signs. IR unable to place perc sugey drain due to bowel loop in the way. GS aware. Will watch pt overnight on iv antibiotics. If worsens may need to accept risk and go to OR. Continue iv antibiotics. Will recheck in AM.    2/20: Pt seen. More awake today. Discussed with GS. Seems to be responding to antibiotics. Started clears. Will transfer to med-surg. Tolerating clears but still has some abdominal pain with po intake. Current plan is to treat overnight and consider eventual release home on oral antibiotics. Will plan sugey as outpatient after completing antibiotics. Reassess in AM.    2/21: Patient seen.  Still has some right upper quadrant pain but appears to be tolerating clears.  Advance diet per general surgery.  Currently on Zosyn.  Consider switching to Unasyn anticipating switch to Augmentin on discharge.  T[facility] working on discharge to SNF, [facility]  Await further input.     2/22: No acute events overnight.  Patient was in the OR most of the day.  I did get a message from the surgery team that the patient had gangrenous gallbladder and was hence had a drain placed.  Will continue IV antibiotics.  Monitor for another 24 to 48 hours.    2/23: No acute events overnight.  Patient's pain is much improved with the current pain regimen.  Drain is in place.  Surgery recommendations much appreciated.  Continue IV antibiotics.  Encourage out of bed.  Bowel regimen.    2/24: No acute events overnight.  Cleared for discharge from a surgical standpoint.  Will go home with drain.  Continue IV antibiotics.  Cr trending up with an increase to 1.85.  Will hold Entresto and continue to monitor for another 24-48 hours.  [facility].      2/25: No acute events overnight.  Patient notes improvement in pain.  Denies nausea, vomiting and diarrhea.  Able to tolerate meals.  Surgically cleared for discharge yesterday.  Surgery likely to remove drain today.  Continue IV  antibiotics.  Will switch to oral Augmentin when ready for discharge.  Cr decreased to 1.46.  Will continue to hold Entresto.  [facility] for [facility].      Objective     Last Recorded Vitals  /70 (BP Location: Right arm, Patient Position: Lying)   Pulse 52   Temp 36 °C (96.8 °F) (Temporal)   Resp 20   Wt 108 kg (239 lb)   SpO2 93%   Intake/Output last 3 Shifts:    Intake/Output Summary (Last 24 hours) at 2/25/2025 0946  Last data filed at 2/25/2025 0934  Gross per 24 hour   Intake 1759.16 ml   Output 1115 ml   Net 644.16 ml       Admission Weight  Weight: 112 kg (247 lb) (02/18/25 0811)    Daily Weight  02/22/25 : 108 kg (239 lb)    Electrocardiogram, 12-lead PRN ACS symptoms  Sinus rhythm  Ventricular premature complex  Probable left atrial enlargement  Abnormal R-wave progression, late transition  Left ventricular hypertrophy  Inferior infarct, old    Baseline wander in lead(s) V3    Confirmed by Pollo Flores ([id number]) on 2/24/2025 1:51:30 PM    CONSTITUTIONAL - Alert, resting comfortably in bed, obese  EYES - pupils are equal and reactive to light  CHEST - clear to auscultation bilaterally with no wheezes, crackles or rales,   CARDIAC - RRR, no murmur, gallops or rubs  ABDOMEN - large abdomen, overall soft, non-tender upon palpation, drain in place with serosanguinous drainage   EXTREMITIES - no edema, no deformities  NEUROLOGICAL - alert, oriented x3 and no acute focal signs  PSYCHIATRIC - alert, pleasant and cordial, age-appropriate     Lab Results  Results for orders placed or performed during the hospital encounter of 02/18/25 (from the past 24 hours)   POCT GLUCOSE   Result Value Ref Range    POCT Glucose 153 (H) 74 - 99 mg/dL   POCT GLUCOSE   Result Value Ref Range    POCT Glucose 126 (H) 74 - 99 mg/dL   POCT GLUCOSE   Result Value Ref Range    POCT Glucose 125 (H) 74 - 99 mg/dL   Basic Metabolic Panel   Result Value Ref Range    Glucose 122 (H) 74 - 99 mg/dL    Sodium 138 136 -  145 mmol/L    Potassium 3.4 (L) 3.5 - 5.3 mmol/L    Chloride 107 98 - 107 mmol/L    Bicarbonate 24 21 - 32 mmol/L    Anion Gap 10 10 - 20 mmol/L    Urea Nitrogen 17 6 - 23 mg/dL    Creatinine 1.46 (H) 0.50 - 1.30 mg/dL    eGFR 52 (L) >60 mL/min/1.73m*2    Calcium 7.7 (L) 8.6 - 10.3 mg/dL   POCT GLUCOSE   Result Value Ref Range    POCT Glucose 102 (H) 74 - 99 mg/dL      Scheduled medications  acetaminophen, 650 mg, oral, q6h  aspirin, 81 mg, oral, Daily  cholecalciferol, 4,000 Units, oral, Daily  heparin (porcine), 5,000 Units, subcutaneous, q8h  insulin lispro, 0-5 Units, subcutaneous, TID AC  isosorbide mononitrate ER, 30 mg, oral, Daily  lidocaine, 1 patch, transdermal, Daily  metoprolol tartrate, 25 mg, oral, BID  pantoprazole, 40 mg, oral, Daily before breakfast   Or  pantoprazole, 40 mg, intravenous, Daily before breakfast  piperacillin-tazobactam, 3.375 g, intravenous, q6h  polyethylene glycol, 17 g, oral, Daily  [Held by provider] sacubitriL-valsartan, 1 tablet, oral, BID      Continuous medications       PRN medications  PRN medications: bisacodyl, bisacodyl, dextrose, dextrose, glucagon, glucagon, guaiFENesin, HYDROmorphone, melatonin, ondansetron ODT **OR** ondansetron, oxyCODONE, oxyCODONE       Assessment/Plan     Assessment & Plan  Cholecystitis  RUQ pain due to cholecystitis  Continue IV zosyn  Surgery to see, has consulted IR for cholecystotomy tube  NPO x meds  2/19: IR unable to place due to due to anatomy. GS will watch overnight, if worsens may need to go to OR. Continue antibiotics. Keep NPO.  2/20: Some improvement in WBC and Cr. Doubt will need emergent surgery at this point. Will transfer to med-surg. Continue iv antibiotics for now. Reassess in AM.  2/21: Currently on Zosyn and slow improvement.  Still has right upper quadrant tenderness.  Will need to follow-up with general surgery as outpatient.  Consider switching to Unasyn.  2/22: Surgery recommendations appreciated.  Drain placed.  Start  on clear liquid diet  2/23: Surgery recommendation much appreciated.  Continue antibiotics.  Status post partial cholecystectomy and drain placement postoperative day 1.  Continue pain control.  Continue IV antibiotics.  2/24: Surgically cleared for discharge. Will go home with drain in place. Continue antibiotics and pain control.  Increase in Cr to 1.85.  Will hold Entresto and continue to monitor for 24-28 hours.    2/25: Per surgery, drain likely to be removed today.  Continue antibiotics and pain control.  Cr improved to 1.46.  Will continue to hold Entresto.  Clinically ready for discharge.  [facility].    Diabetes mellitus (Multi)  By records only. Does not appear to be on oral medication.  Last recorded A1c was 6.4 in 2025  Not currently on sliding scale.  2/19: Will place on SSI, may have less glycemic control while ill.  2/21: Glycemic control is pretty good with SSI.  Hyperlipidemia  Continue statin.  Hypertension  Continue home meds, adjust as needed.    CAD (coronary artery disease), native coronary artery  CAD w elevated troponin  Check UDS given hx cocaine use  Pt not taking meds, will consult cardiology  Continue beta blocker if UDS negative for cocaine  2/19: Awaiting cards consult.  2/20: Revised cardiac risk index appears to be about 4 (For intraperitoneal surgery, hx CHF, MI, occasional insulin), risk of major cardiac event is 15%. Does not appear to be in active CHF or coronary ischemia. Appreciate cards input.  2/21: Asymptomatic.  Dehydration  Dehydration due to acute illness  IV hydration, monitor for overload, last ef 35-40%  2/20: Appears euvolemic at present.  Chronic systolic heart failure  Last EF found was 35-40% in 2023.  Appears to be on Entresto. Will continue for now.  2/20: Appears euvolemic at present. Recheck in AM.  2/21: Appears to be tolerating Entresto.  2/24: Will hold Entresto due to increase in creatinine.  Continue to monitor for 24-48 hours.     QASIM (acute kidney injury) (CMS-[facility])  2/24: Increase in creatinine to 1.85.  Will hold Entresto and continue to monitor for 24-48 hours.  Consult placed to nephrology.   2/25: Cr improved to 1.46.  Will continue to hold Entresto.     DVT Prophylaxis   - heparin       Jennifer Pope   Medical Student, OMS-III      Pt seen and examined  with my Medical student . I have modified the note to reflect my documentation of HPI and assessment and plan.    Keyur Magallon MD MRCP

## 2025-02-25 NOTE — NURSING NOTE
Discharge instructions reviewed with patient, he verbalized understanding. Iv removed. Surgeon previously removed JINA, site dressing cdi. . Meds to beds delivered. Patient and visitor declined to wait for some assistance to the car for discharge. Visitor requested to wheel patient out herself and declined help.

## 2025-02-25 NOTE — PROGRESS NOTES
"GENERAL SURGERY PROGRESS NOTE    Román Marinelli   1955   [id number]     Román Marinelli is a 69 y.o. male on day 7 of admission presenting with Cholecystitis.    Status post laparoscopic partial cholecystectomy, drain placement on 2/22/2025.    Subjective  No acute events overnight.  Reports feeling improved.  Denies abdominal pain.  Tolerating regular food.  Passing flatus and had a bowel movement.    Review of Systems:  Review of Systems   Constitutional:  Negative for chills and fever.   Respiratory:  Negative for chest tightness and shortness of breath.    Cardiovascular:  Negative for chest pain.   Gastrointestinal:  Negative for abdominal pain and nausea.   Genitourinary:  Negative for difficulty urinating.   Skin:  Negative for rash.       Objective    Last Recorded Vitals  Blood pressure 132/70, pulse 52, temperature 36 °C (96.8 °F), temperature source Temporal, resp. rate 20, height 1.753 m (5' 9\"), weight 108 kg (239 lb), SpO2 93%.    Intake/Output last 3 Shifts:  I/O last 3 completed shifts:  In: 1893.7 (17.5 mL/kg) [P.O.:600; I.V.:1093.7 (10.1 mL/kg); IV Piggyback:200]  Out: 1115 (10.3 mL/kg) [Urine:1100 (0.3 mL/kg/hr); Drains:15]  Weight: 108.4 kg     Intake/Output Summary (Last 24 hours) at 2/25/2025 1318  Last data filed at 2/25/2025 0934  Gross per 24 hour   Intake 1488.33 ml   Output 1115 ml   Net 373.33 ml       Physical Exam  Constitutional:       General: He is not in acute distress.  HENT:      Head: Normocephalic.   Eyes:      General: No scleral icterus.  Cardiovascular:      Rate and Rhythm: Normal rate and regular rhythm.      Pulses: Normal pulses.   Pulmonary:      Effort: Pulmonary effort is normal. No respiratory distress.   Abdominal:      Palpations: Abdomen is soft.      Comments: Mildly distended, appropriately tender near incisions and near drain.  Drain in place with serosanguineous output in bulb.  Incisions well-approximated with skin glue in place.   Musculoskeletal:         " General: No swelling.      Cervical back: Neck supple.   Skin:     General: Skin is warm and dry.   Neurological:      Mental Status: He is alert. Mental status is at baseline.         Relevant Results  Labs:  Results for orders placed or performed during the hospital encounter of 02/18/25 (from the past 24 hours)   POCT GLUCOSE   Result Value Ref Range    POCT Glucose 126 (H) 74 - 99 mg/dL   POCT GLUCOSE   Result Value Ref Range    POCT Glucose 125 (H) 74 - 99 mg/dL   Basic Metabolic Panel   Result Value Ref Range    Glucose 122 (H) 74 - 99 mg/dL    Sodium 138 136 - 145 mmol/L    Potassium 3.4 (L) 3.5 - 5.3 mmol/L    Chloride 107 98 - 107 mmol/L    Bicarbonate 24 21 - 32 mmol/L    Anion Gap 10 10 - 20 mmol/L    Urea Nitrogen 17 6 - 23 mg/dL    Creatinine 1.46 (H) 0.50 - 1.30 mg/dL    eGFR 52 (L) >60 mL/min/1.73m*2    Calcium 7.7 (L) 8.6 - 10.3 mg/dL   POCT GLUCOSE   Result Value Ref Range    POCT Glucose 102 (H) 74 - 99 mg/dL   POCT GLUCOSE   Result Value Ref Range    POCT Glucose 114 (H) 74 - 99 mg/dL       Images:  IR biliary cholecystostomy   Final Result   Ultrasound evaluation for percutaneous cholecystostomy tube placement   demonstrates unfavorable anatomy and a safe approach could not be   identified. No intervention performed.             Performed and dictated at [facility].        MACRO:   None        Signed by: Rubio Ch 2/19/2025 9:32 AM   Dictation workstation:   [id number]      CT abdomen pelvis w IV contrast   Final Result   Distended gallbladder demonstrating wall thickening, pericholecystic   fluid, and surrounding inflammation, suggesting cholecystitis. If   further evaluation is warranted, HIDA scan or ultrasound can be   offered. Partial collapse/consolidation of the right lower lobe.   Nonspecific ground-glass opacities as well as areas of   atelectasis/scarring in the remaining visible portions of the lung   bases. Colonic diverticulosis.   Hepatic  steatosis.        MACRO:   None.        Signed by: Jarrod Strange 2/18/2025 10:27 AM   Dictation workstation:   [id number]          Assessment and Plan  Assessment & Plan  Cholecystitis    Diabetes mellitus (Multi)    Hyperlipidemia    Hypertension    CAD (coronary artery disease), native coronary artery    Dehydration    Chronic systolic heart failure    QASIM (acute kidney injury) (CMS-[facility])    69 y.o. male with history of stroke 1 year ago, hypertension, and intermittent cocaine use (last use last week), presented with 3 days of right upper quadrant abdominal pain with CT imaging demonstrating signs concerning for acute cholecystitis.  Patient was unable to undergo IR placement of PERC cholecystostomy tube due to anatomical barriers.  Attempted conservative management however patient continued to have pain and had a worsening leukocytosis.  Patient was brought to the OR 2/22/2025 for diagnostic laparoscopy, partial cholecystectomy, drain placement.  Doing well postoperatively.  Creatinine decreased today.    Plan:  - Continue low-fat diet  - Continue IV antibiotics: Zosyn.  Will switch to oral Augmentin when ready for discharge.  - Pain control and antinausea medication  - Likely remove drain today  - PT/OT  - Creatinine down trended from yesterday, follow-up IMS plans    Discussed with attending Dr. Helio Payne,  - PGY4  General Surgery

## 2025-02-25 NOTE — PROGRESS NOTES
Patient's creatinine is down-trending. Anticipate discharge today. I did not hear [facility] yesterday regarding [facility] for [facility] care. [facility] morning again, and had to leave another voice mail. Awaiting return call.     Call received from [facility] at the [facility]. She has not presented patient's case to the [facility] team for skilled care yet. Franny is faxing an application and documents to be completed to me this afternoon. Forms will need to be completed and sent back to the [facility] prior to approval. Awaiting forms to complete.     [facility] paperwork received from the [facility]. Patient signed all required paperwork, and this was sent to the [facility] at [phone]. Awaiting determination on acceptance to the CLC program.     Updated by nursing that patient now wants to go home. Surgery to remove his drain today prior to discharge.

## 2025-02-26 LAB
BACTERIA FLD CULT: NORMAL
GRAM STN SPEC: NORMAL
GRAM STN SPEC: NORMAL

## 2025-02-26 NOTE — DISCHARGE SUMMARY
Discharge Diagnosis  Acute gangrenous cholecystitis status post partial colectomy and drain placement  Diabetes Mellitus   Hyperlipidemia   Hypertension   CAD, native coronary artery   Dehydration   Chronic systolic heart failure   Acute Kidney Injury     Issues Requiring Follow-Up  Follow up with PCP after discharge     Discharge Meds     Medication List      START taking these medications     amoxicillin-pot clavulanate 875-125 mg tablet; Commonly known as:   Augmentin; Take 1 tablet by mouth 2 times a day for 5 days.   atorvastatin 80 mg tablet; Commonly known as: Lipitor; Take 1 tablet (80   mg) by mouth once daily at bedtime.   isosorbide mononitrate ER 30 mg 24 hr tablet; Commonly known as: Imdur;   Take 1 tablet (30 mg) by mouth once daily. Do not crush or chew.   metoprolol succinate XL 25 mg 24 hr tablet; Commonly known as:   Toprol-XL; Take 1 tablet (25 mg) by mouth once daily. Do not crush or   chew.   oxyCODONE 5 mg immediate release tablet; Commonly known as: Roxicodone;   Take 1 tablet (5 mg) by mouth every 6 hours if needed for severe pain (7 -   10) or moderate pain (4 - 6) for up to 5 days.     CONTINUE taking these medications     aspirin 81 mg EC tablet; Take 1 tablet (81 mg) by mouth once daily.   cholecalciferol 50 MCG (2000 UT) tablet; Commonly known as: Vitamin D-3     STOP taking these medications     acetaminophen 325 mg tablet; Commonly known as: Tylenol   sacubitriL-valsartan 24-26 mg tablet; Commonly known as: Entresto     ASK your doctor about these medications     magnesium hydroxide 2,400 mg/10 mL suspension suspension; Commonly known   as: Milk of Magnesia; Take 10 mL by mouth once daily as needed for   constipation.   melatonin 3 mg tablet; Take 1 tablet (3 mg) by mouth once daily.       Test Results Pending At Discharge  Pending Labs       Order Current Status    Surgical Pathology Exam In process            Hospital Course   Román Marinelli is a 69 y.o. male with PMHx s/f cocaine  use, coronary artery disease, cardiomyopathy, diabetes type 2, hyperlipidemia presenting with abdominal pain fatigue malaise.  Patient came to hospital Red eye right upper quadrant abdominal pain ongoing for about 3 days.  The pain is becoming progressively worse could not take it anymore.  Patient has also had some vomiting associated with the pain.  He is denying any fevers chills or rigors but has not been eating or drinking very well.  He denies any chest pain cough any sputum production.  Has not had any diarrhea or urinary symptoms.  Emergency department workup demonstrated probable acute cholecystitis General Surgery was consulted patient started on IV Zosyn given IV fluids and hydration.  Case was discussed with the ED provider patient will be admitted length of stay likely to exceed 2 midnights.  Patient will need continued IV antibiotics interventional procedure with radiology to perform cholecystotomy tube and allow time for antibiotics to treat the underlying infection.     ED Course (Summary):   Vitals on presentation: Temperature 99 heart rate 105 respiratory rate 18 blood pressure 166/112 SpO2 95% room air  Labs: Glucose 169 creatinine 1.43 total bilirubin 1.8 initial troponin 60 9 repeat troponin 67 CBC significant for leukocytosis white blood cell count 21.8 platelets 293  Imaging: CT of abdomen and pelvis showing distended gallbladder demonstrating wall thickening pericholecystic fluid nonspecific groundglass opacities and atelectasis and remaining portion of the lung bases  Interventions: Patient given IV fluids started on IV Zosyn ED provider consulted with general surgery will plan for cholecystotomy tube and admission     2/19: Pt seen. Had received pain medication and is groggy. Does not withdrawal from abdominal exam, no peritoneal signs. IR unable to place perc sugey drain due to bowel loop in the way. GS aware. Will watch pt overnight on iv antibiotics. If worsens may need to accept risk  and go to OR. Continue iv antibiotics. Will recheck in AM.     2/20: Pt seen. More awake today. Discussed with GS. Seems to be responding to antibiotics. Started clears. Will transfer to med-surg. Tolerating clears but still has some abdominal pain with po intake. Current plan is to treat overnight and consider eventual release home on oral antibiotics. Will plan sugey as outpatient after completing antibiotics. Reassess in AM.     2/21: Patient seen.  Still has some right upper quadrant pain but appears to be tolerating clears.  Advance diet per general surgery.  Currently on Zosyn.  Consider switching to Unasyn anticipating switch to Augmentin on discharge.  TCC working on discharge to SNF, [facility].  Await further input.        2/22: No acute events overnight.  Patient was in the OR most of the day.  I did get a message from the surgery team that the patient had gangrenous gallbladder and was hence had a drain placed.  Will continue IV antibiotics.  Monitor for another 24 to 48 hours.     2/23: No acute events overnight.  Patient's pain is much improved with the current pain regimen.  Drain is in place.  Surgery recommendations much appreciated.  Continue IV antibiotics.  Encourage out of bed.  Bowel regimen.     2/24: No acute events overnight.  Cleared for discharge from a surgical standpoint.  Will go home with drain.  Continue IV antibiotics.  Cr trending up with an increase to 1.85.  Will hold Entresto and continue to monitor for another 24-48 hours.  [facility]charge planning [facility] the [facility].       2/25: No acute events overnight.  Patient notes improvement in pain.  Denies nausea, vomiting and diarrhea.  Able to tolerate meals.  Surgically cleared for discharge yesterday.  Surgery likely to remove drain today.  Continue IV antibiotics.  Will switch to oral Augmentin when ready for discharge.  Cr decreased to 1.46.  Will continue to hold Entresto.  [facility] for  [facility].  Patient is to follow in the surgery clinic in 1 week's time.  Patient is to follow with his PCP in 1 week's time.    Pertinent Physical Exam At Time of Discharge  CONSTITUTIONAL - Alert, resting comfortably in bed, obese  EYES - pupils are equal and reactive to light  CHEST - clear to auscultation bilaterally with no wheezes, crackles or rales,   CARDIAC - RRR, no murmur, gallops or rubs  ABDOMEN - large abdomen, overall soft, non-tender upon palpation, drain in place with serosanguinous drainage   EXTREMITIES - no edema, no deformities  NEUROLOGICAL - alert, oriented x3 and no acute focal signs  PSYCHIATRIC - alert, pleasant and cordial, age-appropriate    Outpatient Follow-Up  No future appointments.    Discharge planning took more than 30 minutes.      Jennifer Pope   Medical Student, OMS-III      Pt seen and examined  with my Medical student . I have modified the note to reflect my documentation of HPI and assessment and plan.    Keyur Magallon MD MRCP

## 2025-03-03 LAB
LABORATORY COMMENT REPORT: NORMAL
PATH REPORT.FINAL DX SPEC: NORMAL
PATH REPORT.GROSS SPEC: NORMAL
PATH REPORT.RELEVANT HX SPEC: NORMAL
PATH REPORT.TOTAL CANCER: NORMAL

## 2025-03-31 NOTE — ED PROVIDER NOTES
Chief Complaint   Patient presents with    Abdominal Pain    Vomiting        HPI:  69 y.o. male with a history of prior CVA, hypertension who is presenting to the ED with right-sided abdominal pain.  Patient reports he initially developed right sided abdominal pain about 3 days ago.  It has been constant and progressively worsening.  He was also having recurrent nausea and vomiting.  The vomiting seems to have improved currently but pain has not. He has never had anything like this before.  He does report having some chills now.  Bowel movements have been slightly diminished but he attributes this to decreased p.o. intake.  No diarrhea.  No dysuria or hematuria.  No chest pain or shortness of breath.    History provided by: patient  Limitations to history: none    ------------------------------------------------------------------------------------------------------------------------------------------    Physical Exam:  T 37.2 °C (99 °F)  HR (!) 105  BP (!) 166/112  RR 18  O2 95 % None (Room air)    Triage vitals reviewed.  Constitutional: Well developed adult, appears unwell and in pain  Head: Normocephalic, atraumatic  Eyes: Pupils are equal. No conjunctival injection.  Neck: Supple. Trachea is midline.  Pulmonary: Tachypneic but normal work of breathing with no accessory muscle use noted.  Clear to auscultation bilaterally.   Cardiovascular: Tachycardic, regular. 2+ radial pulses bilaterally.   Abdomen: Diffuse abdominal pain but more focal tenderness to palpation in the right mid abdomen, mildly distended but still soft and easily compressible.  No CVA tenderness.  Extremities: No gross deformities.  Moving all extremities spontaneously without difficulty.  Neuro: Awake and alert. Face is symmetric.  Speech is clear. No obvious focal findings.  Psych: Appropriate mood and  affect.    ------------------------------------------------------------------------------------------------------------------------------------------    Medical Decision Making:  This patient is a 69 y.o. male who is presenting to the ED with abdominal pain, nausea, vomiting.  Appears uncomfortable, tachycardic.  Has focal significant right-sided abdominal tenderness.  Concern for appendicitis, cholecystitis, perforation.  Less likely nephrolithiasis, pyelonephritis, or is obstructed stone based on symptoms/exam.  Labs, CT abdomen pelvis ordered.  He was given IV morphine for pain control but declined antiemetics at this time.    CT was notable for cholecystitis with distended gallbladder, pericholecystic fluid and surrounding inflammation.  Marked leukocytosis and QASIM also noted on labs.  Started on Zosyn and IV fluids.  Surgery was consulted and evaluated patient at bedside.  Given patient has had symptoms for several days, they are recommending IR percutaneous cholecystostomy tube in will assist with arranging this.  Patient will be admitted to medicine for further management and surgery will plan to follow.  Discussed with hospitalist KERRI who is in agreement with plan.     ED Course:  ED Course as of 03/31/25 0053   Tue Feb 18, 2025 1109 Discussed with surgery resident.  They will come to see the patient. [DR]      ED Course User Index  [DR] Socorro Warner DO         Diagnoses as of 03/31/25 0053   Cholecystitis   QASIM (acute kidney injury)        Clinical Impression:  Acute cholecystitis  QASIM    Disposition:  Admit to telemetry    Socorro Warner DO  Emergency Medicine         Socorro Warner DO  03/31/25 0103

## (undated) DEVICE — SUTURE, VICRYL, 0, 27 IN, UR-6, VIOLET

## (undated) DEVICE — APPLICATOR, CHLORAPREP, W/ORANGE TINT, 26ML

## (undated) DEVICE — DISSECTOR, KITTNER, PEANUT, 5MM

## (undated) DEVICE — ADHESIVE, SKIN, LIQUIBAND EXCEED

## (undated) DEVICE — SCOPE WARMER, LAPAROSCOPE, BAG ONLY, LF

## (undated) DEVICE — TUBE, ASPIRATING, LUKI, 20CC, 6.25 IN

## (undated) DEVICE — GLOVE, SURGICAL, PROTEXIS PI BLUE W/NEUTHERA, 6.5, PF, LF

## (undated) DEVICE — SUTURE, VICRYL, 4-0, 18 IN, UNDYED BR PS-2

## (undated) DEVICE — SLEEVE, KII, Z-THREAD, 5X100CM

## (undated) DEVICE — ACCESS SYS, KII SHIELDED BLADED, Z-THREAD, 12X100CM

## (undated) DEVICE — Device

## (undated) DEVICE — DRAPE, SHEET, ENDOSCOPY, GENERAL, FENESTRATED, ARMBOARD COVER, 98 X 123.5 IN, DISPOSABLE, LF, STERILE

## (undated) DEVICE — TROCAR, KII OPTICAL BLADELESS 5MM Z THREAD 100MM LNGTH

## (undated) DEVICE — COVER HANDLE LIGHT, STERIS, BLUE, STERILE

## (undated) DEVICE — GOWN, SURGICAL, FABRIC-REINFORCED, W/TOWEL, XLARGE, DISP

## (undated) DEVICE — PAD, GROUNDING, ELECTROSURGICAL, W/9 FT CABLE, POLYHESIVE II, ADULT, LF

## (undated) DEVICE — ASSEMBLY, STRYKER FLOW 2, SUCTION IRRIGATOR, WITH TIP

## (undated) DEVICE — ADHESIVE, SKIN, DERMABOND ADVANCED, 15CM, PEN-STYLE

## (undated) DEVICE — TRAY, SURESTEP, URINE METER, 16FR, COMPLETE, W/STATLOCK

## (undated) DEVICE — SYSTEM, SMOKE EVAC, SEECLEAR XCL, 8.0 LITER, LF

## (undated) DEVICE — DRAIN, WOUND, FLAT, HUBLESS, 0.75 PERFORATION, 10 MM X 20 CM, SILICONE

## (undated) DEVICE — RETRIEVAL SYSTEM, MONARCH, 10MM DISP ENDOSCOPIC

## (undated) DEVICE — NEEDLE, HYPODERMIC, REGULAR WALL, REGULAR BEVEL, 25 G X 1.5 IN

## (undated) DEVICE — RETRACTOR, ENDOSCOPIC, PADDLE, 12 MM

## (undated) DEVICE — SYRINGE, 10 CC, LUER LOCK

## (undated) DEVICE — CABLE, ELECTROSURGICAL, MONOPOLAR, LAPAROSCOPIC, 10 FT, LF

## (undated) DEVICE — EVACUATOR, WOUND, SUCTION, CLOSED, JACKSON-PRATT, 100 CC, SILICONE

## (undated) DEVICE — LIGASURE, SEALER/DIVIDER MARYLAND JAW, 5MM

## (undated) DEVICE — CAUTERY, PENCIL, PUSH BUTTON, SMOKE EVAC, 70MM

## (undated) DEVICE — GLOVE, PROTEXIS PI CLASSIC, SZ-6.5, PF, LF